# Patient Record
Sex: FEMALE | Race: WHITE | NOT HISPANIC OR LATINO | Employment: OTHER | URBAN - METROPOLITAN AREA
[De-identification: names, ages, dates, MRNs, and addresses within clinical notes are randomized per-mention and may not be internally consistent; named-entity substitution may affect disease eponyms.]

---

## 2017-01-03 ENCOUNTER — TELEPHONE (OUTPATIENT)
Dept: UROLOGY | Facility: CLINIC | Age: 77
End: 2017-01-03

## 2017-01-04 NOTE — TELEPHONE ENCOUNTER
----- Message from Gayle Schaefer LPN sent at 12/28/2016  1:28 PM CST -----  Contact: self  [phone]  Pt informed you were out this week, states she will wait for you to return to clinic next week. States she cannot afford Elmiron or Pyridium scripts that were written by PCP. Wants to know if there are any alternatives you can prescribe that are more affordable.  ----- Message -----     From: Charo Cabrera MA     Sent: 12/28/2016  12:21 PM       To: [facility] Staff    States she is calling regarding ic and the script Elmiron that is too expensive.  States she is in pain again and the antibiotic is not working and she needs something else.

## 2017-01-04 NOTE — TELEPHONE ENCOUNTER
Called and left a message on the home answering machine.  The patient can get AZO Standard over the counter which contains the same ingredient as the Pyridium.  I asked that she call the office if she has any other questions or if this does not work.

## 2017-03-08 ENCOUNTER — HOSPITAL ENCOUNTER (OUTPATIENT)
Dept: RADIOLOGY | Facility: CLINIC | Age: 77
Discharge: HOME | End: 2017-03-08
Attending: INTERNAL MEDICINE
Payer: MEDICARE

## 2017-03-08 ENCOUNTER — HOSPITAL ENCOUNTER (OUTPATIENT)
Dept: RADIOLOGY | Facility: HOSPITAL | Age: 77
Discharge: HOME | End: 2017-03-08
Attending: INTERNAL MEDICINE
Payer: MEDICARE

## 2017-03-08 DIAGNOSIS — Z12.31 VISIT FOR SCREENING MAMMOGRAM: ICD-10-CM

## 2017-03-08 DIAGNOSIS — M85.80 OSTEOPENIA: ICD-10-CM

## 2017-03-08 DIAGNOSIS — M94.9 DISORDER OF CARTILAGE: ICD-10-CM

## 2017-03-08 PROCEDURE — 77067 SCR MAMMO BI INCL CAD: CPT | Mod: TC

## 2017-03-08 PROCEDURE — 77067 SCR MAMMO BI INCL CAD: CPT | Mod: 26,,, | Performed by: RADIOLOGY

## 2017-03-08 PROCEDURE — 77080 DXA BONE DENSITY AXIAL: CPT | Mod: 26,,, | Performed by: INTERNAL MEDICINE

## 2017-03-08 PROCEDURE — 77080 DXA BONE DENSITY AXIAL: CPT | Mod: TC

## 2017-03-08 PROCEDURE — 77063 BREAST TOMOSYNTHESIS BI: CPT | Mod: 26,,, | Performed by: RADIOLOGY

## 2017-03-15 ENCOUNTER — OFFICE VISIT (OUTPATIENT)
Dept: INTERNAL MEDICINE | Facility: CLINIC | Age: 77
End: 2017-03-15
Payer: MEDICARE

## 2017-03-15 VITALS
SYSTOLIC BLOOD PRESSURE: 110 MMHG | HEIGHT: 64 IN | DIASTOLIC BLOOD PRESSURE: 60 MMHG | WEIGHT: 163.13 LBS | OXYGEN SATURATION: 95 % | HEART RATE: 75 BPM | BODY MASS INDEX: 27.85 KG/M2

## 2017-03-15 DIAGNOSIS — F32.0 MAJOR DEPRESSIVE DISORDER, SINGLE EPISODE, MILD: ICD-10-CM

## 2017-03-15 DIAGNOSIS — E78.5 HYPERLIPIDEMIA, UNSPECIFIED HYPERLIPIDEMIA TYPE: Primary | ICD-10-CM

## 2017-03-15 DIAGNOSIS — N30.10 INTERSTITIAL CYSTITIS: ICD-10-CM

## 2017-03-15 DIAGNOSIS — C91.10 CHRONIC LYMPHOCYTIC LEUKEMIA: ICD-10-CM

## 2017-03-15 PROCEDURE — 99214 OFFICE O/P EST MOD 30 MIN: CPT | Mod: S$GLB,,, | Performed by: INTERNAL MEDICINE

## 2017-03-15 PROCEDURE — 99999 PR PBB SHADOW E&M-EST. PATIENT-LVL III: CPT | Mod: PBBFAC,,, | Performed by: INTERNAL MEDICINE

## 2017-03-15 PROCEDURE — 99499 UNLISTED E&M SERVICE: CPT | Mod: S$GLB,,, | Performed by: INTERNAL MEDICINE

## 2017-03-15 PROCEDURE — 1160F RVW MEDS BY RX/DR IN RCRD: CPT | Mod: S$GLB,,, | Performed by: INTERNAL MEDICINE

## 2017-03-15 PROCEDURE — 1157F ADVNC CARE PLAN IN RCRD: CPT | Mod: S$GLB,,, | Performed by: INTERNAL MEDICINE

## 2017-03-15 PROCEDURE — 1159F MED LIST DOCD IN RCRD: CPT | Mod: S$GLB,,, | Performed by: INTERNAL MEDICINE

## 2017-03-15 PROCEDURE — 1126F AMNT PAIN NOTED NONE PRSNT: CPT | Mod: S$GLB,,, | Performed by: INTERNAL MEDICINE

## 2017-03-15 NOTE — PROGRESS NOTES
CHIEF COMPLAINT:Follow up of CLL, stress, reflux, allergies    HISTORY OF PRESENT ILLNESS: This is a 75-year-old woman who presents for follow up of above.     Her abdominal pain is better. Her interstitial cystitis is better. She has been watching her diet closely.  She is no longer drinking alcohol and soda beverages which she thinks triggered her flare last fall.  No dysuria or hematuria     Her WBC for her CLL has been stable. No fever, chills, night sweatts, weight loss. She saw Dr Merchant 16    Stress is well managed with Celexa 40 mg 1 tablet daliy. She denies any overwhelming insomnia, anxiety or depression. Reflux is controlled on omeprazole 20 mg 2 tablets daily.       Allergies are controlled with loratadine 10 mg daily. She takes meclizine 25 mg as needed for dizziness. NO dizziness      No fever, chills, visual issues, hearing issues, chest pain, shortness of breath, nausea, vomiting, constipation, diarrhea, dysuria, hematuria, joint pain, muscle pain, rashes, headaches, insomnia.        PAST MEDICAL HISTORY:   1. Reflux.   2. Hyperlipidemia.   3. Interstitial cystitis - controlled with diet.   4. Chronic lymphocytic leukemia diagnosed . No need for treatment at this point in time.   5. Situational stress with anxiety and depression.   6. History of back pain in  - resolved.   7. History of liver biopsy  which revealed a hemangioma.     PAST SURGICAL HISTORY:   1. .   2. Hysterectomy.   3. Cholecystectomy.   4. Ganglion cyst removed from the left foot.     SOCIAL HISTORY: She is a past smoker, quit smoking 20 years ago; she   smoked a pack per week on and off for 5-10 years. No alcohol.    with two children.     MEDICATIONS and ALLERGIES: Updated on EPIC     PHYSICAL EXAMINATION:       General: Alert, oriented. No apparent distress. Affect within normal limits.   Conjunctivae anicteric. Tympanic membranes clear. Oropharynx erythematus with exudate   Neck supple. No  cervical lymphadenopathy  Respiratory effort normal. Lungs clear to auscultation.   Heart: Regular rate and rhythm without murmurs, gallops or rubs.   No lower extremity edema.      Labs 3/8/2017  MMG 3/8/17 reviewed  BMD 3/8/17- normal.       ASSESSMENT AND PLAN:       1. Interstitial cystitis - stable  2. Reflux - controlled   3. Situational stress/major depression - on celexa to 40 mg 1 tablet daily  4. CLL -stable  6. History of thyroid nodules - thyroid ultrasound stable   7. Hyperlipidemia -continue to work on diet, exercise and weight loss and flax seed oil. Does not want lipid lowering medications    8. I'll see her back in 6 months, sooner if problems arise.       Influenza , Prevnar 10/15, Pneumvax 10/14  MMG   BMD 3/17 - normal and no change  Colonoscopy 2013 -diverticuli - otherwise normal due 10 years - Brother  of colon cancer at age 62, will do in 2018

## 2017-03-28 ENCOUNTER — LAB VISIT (OUTPATIENT)
Dept: LAB | Facility: HOSPITAL | Age: 77
End: 2017-03-28
Attending: INTERNAL MEDICINE
Payer: MEDICARE

## 2017-03-28 ENCOUNTER — OFFICE VISIT (OUTPATIENT)
Dept: HEMATOLOGY/ONCOLOGY | Facility: CLINIC | Age: 77
End: 2017-03-28
Payer: MEDICARE

## 2017-03-28 VITALS
RESPIRATION RATE: 20 BRPM | HEART RATE: 68 BPM | BODY MASS INDEX: 27.93 KG/M2 | DIASTOLIC BLOOD PRESSURE: 60 MMHG | SYSTOLIC BLOOD PRESSURE: 128 MMHG | TEMPERATURE: 99 F | OXYGEN SATURATION: 95 % | WEIGHT: 162.69 LBS

## 2017-03-28 DIAGNOSIS — C91.10 CHRONIC LYMPHOCYTIC LEUKEMIA: Primary | ICD-10-CM

## 2017-03-28 DIAGNOSIS — C91.10 CHRONIC LYMPHOCYTIC LEUKEMIA: ICD-10-CM

## 2017-03-28 LAB
ALBUMIN SERPL BCP-MCNC: 3.5 G/DL
ALP SERPL-CCNC: 68 U/L
ALT SERPL W/O P-5'-P-CCNC: 33 U/L
ANION GAP SERPL CALC-SCNC: 8 MMOL/L
AST SERPL-CCNC: 31 U/L
BILIRUB SERPL-MCNC: 0.5 MG/DL
BUN SERPL-MCNC: 17 MG/DL
CALCIUM SERPL-MCNC: 9.5 MG/DL
CHLORIDE SERPL-SCNC: 102 MMOL/L
CO2 SERPL-SCNC: 30 MMOL/L
CREAT SERPL-MCNC: 0.8 MG/DL
ERYTHROCYTE [DISTWIDTH] IN BLOOD BY AUTOMATED COUNT: 13.6 %
EST. GFR  (AFRICAN AMERICAN): >60 ML/MIN/1.73 M^2
EST. GFR  (NON AFRICAN AMERICAN): >60 ML/MIN/1.73 M^2
GLUCOSE SERPL-MCNC: 151 MG/DL
HCT VFR BLD AUTO: 41.2 %
HGB BLD-MCNC: 13.9 G/DL
LDH SERPL L TO P-CCNC: 179 U/L
MCH RBC QN AUTO: 30 PG
MCHC RBC AUTO-ENTMCNC: 33.7 %
MCV RBC AUTO: 89 FL
NEUTROPHILS # BLD AUTO: 4 K/UL
PLATELET # BLD AUTO: 225 K/UL
PMV BLD AUTO: 10.2 FL
POTASSIUM SERPL-SCNC: 4.5 MMOL/L
PROT SERPL-MCNC: 6.6 G/DL
RBC # BLD AUTO: 4.64 M/UL
SODIUM SERPL-SCNC: 140 MMOL/L
URATE SERPL-MCNC: 4.7 MG/DL
WBC # BLD AUTO: 22.83 K/UL

## 2017-03-28 PROCEDURE — 1160F RVW MEDS BY RX/DR IN RCRD: CPT | Mod: S$GLB,,, | Performed by: INTERNAL MEDICINE

## 2017-03-28 PROCEDURE — 36415 COLL VENOUS BLD VENIPUNCTURE: CPT

## 2017-03-28 PROCEDURE — 83615 LACTATE (LD) (LDH) ENZYME: CPT

## 2017-03-28 PROCEDURE — 99999 PR PBB SHADOW E&M-EST. PATIENT-LVL III: CPT | Mod: PBBFAC,,, | Performed by: INTERNAL MEDICINE

## 2017-03-28 PROCEDURE — 1157F ADVNC CARE PLAN IN RCRD: CPT | Mod: S$GLB,,, | Performed by: INTERNAL MEDICINE

## 2017-03-28 PROCEDURE — 99499 UNLISTED E&M SERVICE: CPT | Mod: S$GLB,,, | Performed by: INTERNAL MEDICINE

## 2017-03-28 PROCEDURE — 85027 COMPLETE CBC AUTOMATED: CPT

## 2017-03-28 PROCEDURE — 99214 OFFICE O/P EST MOD 30 MIN: CPT | Mod: S$GLB,,, | Performed by: INTERNAL MEDICINE

## 2017-03-28 PROCEDURE — 84550 ASSAY OF BLOOD/URIC ACID: CPT

## 2017-03-28 PROCEDURE — 1159F MED LIST DOCD IN RCRD: CPT | Mod: S$GLB,,, | Performed by: INTERNAL MEDICINE

## 2017-03-28 PROCEDURE — 1126F AMNT PAIN NOTED NONE PRSNT: CPT | Mod: S$GLB,,, | Performed by: INTERNAL MEDICINE

## 2017-03-28 PROCEDURE — 80053 COMPREHEN METABOLIC PANEL: CPT

## 2017-03-28 NOTE — PROGRESS NOTES
Subjective:       Patient ID: Jorge Montgomery is a 76 y.o. female.    Chief Complaint: Follow-up    HPI     Weight fluctuates but no significant weight loss.  Energy level ok- still reports fatigue associated with depression  Mom has dementia which is a big strain- age 92     Returns for routine follow-up.  No fevers, chills or recurrent infections. No night sweats.  No lymphadenopathy.  No bleeding or bruising.    Review of Systems   Constitutional: Negative for activity change, appetite change, chills, diaphoresis, fatigue, fever and unexpected weight change.   HENT: Negative for congestion, ear pain, mouth sores, nosebleeds, postnasal drip, rhinorrhea, sinus pressure, sneezing, sore throat, tinnitus and voice change.    Eyes: Negative for redness and visual disturbance.   Respiratory: Negative for cough, chest tightness, shortness of breath and wheezing.    Cardiovascular: Negative for chest pain, palpitations and leg swelling.   Gastrointestinal: Positive for constipation. Negative for abdominal distention, abdominal pain, blood in stool, diarrhea, nausea and vomiting.   Genitourinary: Negative for decreased urine volume, difficulty urinating, dysuria, frequency, hematuria, urgency and vaginal bleeding.   Musculoskeletal: Negative for arthralgias, back pain, joint swelling, myalgias, neck pain and neck stiffness.   Skin: Negative for color change, pallor, rash and wound.   Allergic/Immunologic: Negative for environmental allergies.   Neurological: Negative for dizziness, weakness, light-headedness, numbness and headaches.   Hematological: Negative for adenopathy. Does not bruise/bleed easily.   Psychiatric/Behavioral: Negative for confusion, decreased concentration and dysphoric mood.       Objective:      Physical Exam   Constitutional: She is oriented to person, place, and time. She appears well-developed and well-nourished. No distress.   Presents alone   HENT:   Head: Normocephalic and atraumatic.    Right Ear: External ear normal.   Left Ear: External ear normal.   Nose: Nose normal.   Mouth/Throat: Oropharynx is clear and moist. No oropharyngeal exudate.   Eyes: Conjunctivae and EOM are normal. Pupils are equal, round, and reactive to light. Right eye exhibits no discharge. Left eye exhibits no discharge. No scleral icterus.   Neck: Normal range of motion. Neck supple. No JVD present. No tracheal deviation present. No thyromegaly present.   Cardiovascular: Normal rate, regular rhythm, normal heart sounds and intact distal pulses.  Exam reveals no gallop and no friction rub.    No murmur heard.  Pulmonary/Chest: Effort normal and breath sounds normal. No stridor. No respiratory distress. She has no wheezes. She has no rales. She exhibits no tenderness.   Abdominal: Soft. Bowel sounds are normal. She exhibits no distension and no mass. There is no tenderness. There is no rebound and no guarding.   No hepatosplenomegaly   Musculoskeletal: Normal range of motion. She exhibits no edema or tenderness.   Lymphadenopathy:        Head (right side): No submandibular, no preauricular, no posterior auricular and no occipital adenopathy present.        Head (left side): No submandibular, no preauricular, no posterior auricular and no occipital adenopathy present.     She has no cervical adenopathy.        Right cervical: No superficial cervical, no deep cervical and no posterior cervical adenopathy present.       Left cervical: No superficial cervical, no deep cervical and no posterior cervical adenopathy present.     She has no axillary adenopathy.        Right: No inguinal and no supraclavicular adenopathy present.        Left: No inguinal and no supraclavicular adenopathy present.   Neurological: She is alert and oriented to person, place, and time. No cranial nerve deficit. She exhibits normal muscle tone. Coordination normal.   Skin: Skin is warm and dry. No rash noted. She is not diaphoretic. No erythema. No  pallor.   Psychiatric: She has a normal mood and affect. Her behavior is normal. Judgment and thought content normal.   Nursing note and vitals reviewed.    Labs- reviewed  Assessment:       1. Chronic lymphocytic leukemia        Plan:     1. Parameters stable  No indications to treat  Wbc not doubling, not anemic or thrombocytopenic  No weight loss, recurrent infections, lymphadenopathy or night sweats

## 2017-04-18 RX ORDER — OMEPRAZOLE 20 MG/1
CAPSULE, DELAYED RELEASE ORAL
Qty: 180 CAPSULE | Refills: 4 | Status: SHIPPED | OUTPATIENT
Start: 2017-04-18 | End: 2018-05-25 | Stop reason: SDUPTHER

## 2017-05-16 ENCOUNTER — NURSE TRIAGE (OUTPATIENT)
Dept: ADMINISTRATIVE | Facility: CLINIC | Age: 77
End: 2017-05-16

## 2017-05-17 ENCOUNTER — OFFICE VISIT (OUTPATIENT)
Dept: INTERNAL MEDICINE | Facility: CLINIC | Age: 77
End: 2017-05-17
Payer: MEDICARE

## 2017-05-17 ENCOUNTER — TELEPHONE (OUTPATIENT)
Dept: INTERNAL MEDICINE | Facility: CLINIC | Age: 77
End: 2017-05-17

## 2017-05-17 VITALS
BODY MASS INDEX: 27.52 KG/M2 | OXYGEN SATURATION: 98 % | WEIGHT: 161.19 LBS | RESPIRATION RATE: 16 BRPM | DIASTOLIC BLOOD PRESSURE: 74 MMHG | HEART RATE: 86 BPM | HEIGHT: 64 IN | SYSTOLIC BLOOD PRESSURE: 130 MMHG | TEMPERATURE: 99 F

## 2017-05-17 DIAGNOSIS — K57.30 DIVERTICULOSIS OF LARGE INTESTINE WITHOUT HEMORRHAGE: ICD-10-CM

## 2017-05-17 DIAGNOSIS — R10.2 SUPRAPUBIC PAIN: Primary | ICD-10-CM

## 2017-05-17 DIAGNOSIS — N30.10 INTERSTITIAL CYSTITIS: ICD-10-CM

## 2017-05-17 DIAGNOSIS — N39.0 URINARY TRACT INFECTION WITHOUT HEMATURIA, SITE UNSPECIFIED: ICD-10-CM

## 2017-05-17 LAB
BILIRUB SERPL-MCNC: NORMAL MG/DL
BLOOD URINE, POC: NORMAL
COLOR, POC UA: NORMAL
GLUCOSE UR QL STRIP: NORMAL
KETONES UR QL STRIP: NORMAL
LEUKOCYTE ESTERASE URINE, POC: NORMAL
NITRITE, POC UA: NORMAL
PH, POC UA: 5
PROTEIN, POC: NORMAL
SPECIFIC GRAVITY, POC UA: 1.02
UROBILINOGEN, POC UA: NORMAL

## 2017-05-17 PROCEDURE — 87086 URINE CULTURE/COLONY COUNT: CPT

## 2017-05-17 PROCEDURE — 81002 URINALYSIS NONAUTO W/O SCOPE: CPT | Mod: S$GLB,,, | Performed by: INTERNAL MEDICINE

## 2017-05-17 PROCEDURE — 99999 PR PBB SHADOW E&M-EST. PATIENT-LVL IV: CPT | Mod: PBBFAC,,, | Performed by: INTERNAL MEDICINE

## 2017-05-17 PROCEDURE — 99213 OFFICE O/P EST LOW 20 MIN: CPT | Mod: 25,S$GLB,, | Performed by: INTERNAL MEDICINE

## 2017-05-17 PROCEDURE — 1160F RVW MEDS BY RX/DR IN RCRD: CPT | Mod: S$GLB,,, | Performed by: INTERNAL MEDICINE

## 2017-05-17 PROCEDURE — 1159F MED LIST DOCD IN RCRD: CPT | Mod: S$GLB,,, | Performed by: INTERNAL MEDICINE

## 2017-05-17 PROCEDURE — 1125F AMNT PAIN NOTED PAIN PRSNT: CPT | Mod: S$GLB,,, | Performed by: INTERNAL MEDICINE

## 2017-05-17 RX ORDER — CIPROFLOXACIN 500 MG/1
500 TABLET ORAL 2 TIMES DAILY
Qty: 20 TABLET | Refills: 0 | Status: SHIPPED | OUTPATIENT
Start: 2017-05-17 | End: 2017-09-22

## 2017-05-17 NOTE — TELEPHONE ENCOUNTER
----- Message from Radha Mcintosh sent at 5/17/2017  3:10 PM CDT -----  Contact: Patient  Patient would like to get medical advice.  Symptoms (please be specific):  Pain in lower abdomen and foul smelling urine   How long has patient had these symptoms:  Since 5/15/17  Pharmacy name and phone #:  [id number] - [place] - [address] [facility] [phone] (Phone) [phone] (Fax)  Any drug allergies:  Please see chart.   Comments:  Please call the patient at [phone]    Thanks!

## 2017-05-17 NOTE — TELEPHONE ENCOUNTER
I called and spoke to the patient, she had chills and fever 99.9,  I was very cold, hurts when I sit, it hurts when i sit on a chair,3- 7/10, Pain since Monday, no burning like a fullness only, if i sit on a recliner I don't feel any pain, last bladder infection about 6 months, Booked  kady with Dr. Lozada

## 2017-05-17 NOTE — MR AVS SNAPSHOT
[facility]  [address]  [place] [address]  Phone: [phone]  Fax: [phone]                  Jorge Montgomery   2017 6:20 PM   Office Visit    Description:  Female : 1940   Provider:  Christianne Lozada MD   Department:  [facility]           Reason for Visit     Vaginal Pain     Cough     Generalized Body Aches           Diagnoses this Visit        Comments    Suprapubic pain    -  Primary     Urinary tract infection without hematuria, site unspecified         Interstitial cystitis         Diverticulosis of large intestine without hemorrhage                To Do List           Goals (5 Years of Data)     None       These Medications        Disp Refills Start End    ciprofloxacin HCl (CIPRO) 500 MG tablet 20 tablet 0 2017     Take 1 tablet (500 mg total) by mouth 2 (two) times daily. - Oral    Pharmacy: [facility] [address] [place], [place] [address], [place] - [address]  #: [phone]         [facility] On [facility]     [facility] On Call Nurse Care Line -  Assistance  Unless otherwise directed by your provider, please contact [facility], our nurse care line that is available for  assistance.     Registered nurses in the [facility] On Call Center provide: appointment scheduling, clinical advisement, health education, and other advisory services.  Call: [phone] (toll free)               Medications           Message regarding Medications     Verify the changes and/or additions to your medication regime listed below are the same as discussed with your clinician today.  If any of these changes or additions are incorrect, please notify your healthcare provider.        START taking these NEW medications        Refills    ciprofloxacin HCl (CIPRO) 500 MG tablet 0    Sig: Take 1 tablet (500 mg total) by mouth 2 (two) times daily.    Class: Normal    Route: Oral           Verify that the below list of medications is an accurate  representation of the medications you are currently taking.  If none reported, the list may be blank. If incorrect, please contact your healthcare provider. Carry this list with you in case of emergency.           Current Medications     artificial tear, hypromellose, (GENTEAL  MILD TO MODERATE) 0.3 % Drop Inject into the eye. 1 Gel Both eyes TID-QID    ASCORBATE CALCIUM (BAM-C ORAL) Take 1,000 mg by mouth once daily.    biotin 1 mg tablet Take 1,000 mcg by mouth once daily.    calcium carbonate (OS-JONELLE) 600 mg (1,500 mg) Tab Take 600 mg by mouth 2 (two) times daily with meals.    cetirizine (ZYRTEC) 10 MG tablet Take 1 tablet (10 mg total) by mouth once daily.    cinnamon bark 500 mg capsule Take 500 mg by mouth once daily.    ciprofloxacin HCl (CIPRO) 500 MG tablet Take 1 tablet (500 mg total) by mouth 2 (two) times daily.    citalopram (CELEXA) 40 MG tablet TAKE 1 TABLET ONE TIME DAILY    fluticasone (FLONASE) 50 mcg/actuation nasal spray 1 spray by Each Nare route daily as needed for Rhinitis.    GLUCOSAMINE/MSM/CHONDROITIN A (TRIPLEFLEX ORAL) Take 2 tablets by mouth once daily.    loratadine (CLARITIN) 10 mg tablet Take by mouth. 1 Tablet Oral Every day    meclizine (ANTIVERT) 25 mg tablet 1/2-1 tablet every 6 hours as needed for dizziness    MULTIVIT/IRON/FA/K/HERB NO.244 (ALIVE WOMEN'S ENERGY ORAL) Take 1 tablet by mouth once daily.    omeprazole (PRILOSEC) 20 MG capsule TAKE 1 CAPSULE TWICE DAILY    pentosan polysulfate (ELMIRON) 100 mg Cap Take 1 capsule (100 mg total) by mouth 3 (three) times daily.    phenazopyridine (PYRIDIUM) 200 MG tablet Take 1 tablet (200 mg total) by mouth 3 (three) times daily as needed for Pain.    SACCHAROMYCES BOULARDII (PROBIOTIC, S.BOULARDII, ORAL) Take 1 capsule by mouth once daily.    VIT C/VIT E AC/LUT/COPPER/ZINC (PRESERVISION LUTEIN ORAL) Take 1 tablet by mouth once daily.           Clinical Reference Information           Your Vitals Were     BP Pulse Temp Resp Height  "Weight    130/74 (BP Location: Left arm, Patient Position: Sitting, BP Method: Manual) 86 98.6 °F (37 °C) 16 5' 4" (1.626 m) 73.1 kg (161 lb 2.5 oz)    SpO2 BMI             98% 27.66 kg/m2         Blood Pressure          Most Recent Value    BP  130/74      Allergies as of 5/17/2017     Compazine [Prochlorperazine Edisylate]    Sulfa (Sulfonamide Antibiotics)      Immunizations Administered on Date of Encounter - 5/17/2017     None      Orders Placed During Today's Visit      Normal Orders This Visit    POCT URINE DIPSTICK WITHOUT MICROSCOPE     Urine culture       Language Assistance Services     ATTENTION: Language assistance services are available, free of charge. Please call [phone].      ATENCIÓN: Si esterla stacy, tiene a shepard disposición servicios gratuitos de asistencia lingüística. Llame al [phone].     CHÚ Ý: N?u b?n nói Ti?ng Vi?t, có các d?ch v? h? tr? ngôn ng? mi?n phí dành cho b?n. G?i s? [phone].         [facility] complies with applicable Federal civil rights laws and does not discriminate on the basis of race, color, national origin, age, disability, or sex.        "

## 2017-05-18 NOTE — PROGRESS NOTES
Subjective:       Patient ID: Jorge Montgomery is a 76 y.o. female.    Chief Complaint: Vaginal Pain; Cough; and Generalized Body Aches    Vaginal Pain   Primary symptoms comment: perineal pain and suprapubic pain. This is a new problem. The current episode started in the past 7 days. The problem occurs constantly. The problem has been unchanged. The pain is mild. She is not pregnant. Associated symptoms include abdominal pain, frequency and urgency. Pertinent negatives include no chills, constipation, diarrhea, discolored urine, dysuria, fever, headaches, nausea, rash, sore throat or vomiting. Nothing aggravates the symptoms. She has tried nothing for the symptoms. The treatment provided no relief.   Cough   Pertinent negatives include no chest pain, chills, ear pain, fever, headaches, postnasal drip, rash, sore throat, shortness of breath or wheezing.     Review of Systems   Constitutional: Negative for activity change, chills, fatigue and fever.   HENT: Negative for congestion, ear pain, nosebleeds, postnasal drip, sinus pressure and sore throat.    Eyes: Negative.  Negative for visual disturbance.   Respiratory: Positive for cough. Negative for chest tightness, shortness of breath and wheezing.    Cardiovascular: Negative for chest pain.   Gastrointestinal: Positive for abdominal pain. Negative for constipation, diarrhea, nausea and vomiting.        History of diverticulosis   Genitourinary: Positive for frequency, urgency and vaginal pain. Negative for difficulty urinating and dysuria.        History of interstitial cystitis   Musculoskeletal: Negative for arthralgias and neck stiffness.   Skin: Negative for rash.   Neurological: Negative for dizziness, weakness and headaches.   Psychiatric/Behavioral: Negative for sleep disturbance. The patient is not nervous/anxious.        Objective:      Physical Exam   Constitutional: She is oriented to person, place, and time. She appears well-developed and  well-nourished.  Non-toxic appearance. No distress.   HENT:   Head: Normocephalic and atraumatic.   Right Ear: Tympanic membrane, external ear and ear canal normal.   Left Ear: Tympanic membrane, external ear and ear canal normal.   Eyes: EOM are normal. Pupils are equal, round, and reactive to light. No scleral icterus.   Neck: Normal range of motion. Neck supple. No thyromegaly present.   Cardiovascular: Normal rate, regular rhythm and normal heart sounds.    Pulmonary/Chest: Effort normal and breath sounds normal.   Abdominal: Soft. Bowel sounds are normal. She exhibits no mass. There is tenderness in the right lower quadrant and left lower quadrant. There is no rigidity, no rebound and no guarding.   Musculoskeletal: Normal range of motion.   Lymphadenopathy:     She has no cervical adenopathy.   Neurological: She is alert and oriented to person, place, and time. She has normal reflexes. She displays normal reflexes. No cranial nerve deficit. She exhibits normal muscle tone. Coordination normal.   Skin: Skin is warm and dry.   Psychiatric: She has a normal mood and affect. Her behavior is normal.       Assessment:       1. Suprapubic pain    2. Urinary tract infection without hematuria, site unspecified    3. Interstitial cystitis    4. Diverticulosis of large intestine without hemorrhage        Plan:   Jorge was seen today for vaginal pain, cough and generalized body aches.    Diagnoses and all orders for this visit:    Suprapubic pain  -     POCT URINE DIPSTICK WITHOUT MICROSCOPE  -     Urine culture    Urinary tract infection without hematuria, site unspecified  -     Urine culture    Interstitial cystitis  -     Urine culture    Diverticulosis of large intestine without hemorrhage    Other orders  -     ciprofloxacin HCl (CIPRO) 500 MG tablet; Take 1 tablet (500 mg total) by mouth 2 (two) times daily.

## 2017-05-19 ENCOUNTER — TELEPHONE (OUTPATIENT)
Dept: INTERNAL MEDICINE | Facility: CLINIC | Age: 77
End: 2017-05-19

## 2017-05-19 LAB — BACTERIA UR CULT: NO GROWTH

## 2017-09-13 ENCOUNTER — LAB VISIT (OUTPATIENT)
Dept: LAB | Facility: HOSPITAL | Age: 77
End: 2017-09-13
Attending: INTERNAL MEDICINE
Payer: MEDICARE

## 2017-09-13 DIAGNOSIS — E78.5 HYPERLIPIDEMIA, UNSPECIFIED HYPERLIPIDEMIA TYPE: ICD-10-CM

## 2017-09-13 LAB
ALBUMIN SERPL BCP-MCNC: 3.9 G/DL
ALP SERPL-CCNC: 67 U/L
ALT SERPL W/O P-5'-P-CCNC: 34 U/L
ANION GAP SERPL CALC-SCNC: 9 MMOL/L
AST SERPL-CCNC: 37 U/L
BASOPHILS # BLD AUTO: ABNORMAL K/UL
BASOPHILS NFR BLD: 0 %
BILIRUB SERPL-MCNC: 0.7 MG/DL
BUN SERPL-MCNC: 13 MG/DL
CALCIUM SERPL-MCNC: 9.6 MG/DL
CHLORIDE SERPL-SCNC: 103 MMOL/L
CHOLEST SERPL-MCNC: 222 MG/DL
CHOLEST/HDLC SERPL: 4 {RATIO}
CO2 SERPL-SCNC: 30 MMOL/L
CREAT SERPL-MCNC: 0.8 MG/DL
DIFFERENTIAL METHOD: ABNORMAL
EOSINOPHIL # BLD AUTO: ABNORMAL K/UL
EOSINOPHIL NFR BLD: 0 %
ERYTHROCYTE [DISTWIDTH] IN BLOOD BY AUTOMATED COUNT: 14.2 %
EST. GFR  (AFRICAN AMERICAN): >60 ML/MIN/1.73 M^2
EST. GFR  (NON AFRICAN AMERICAN): >60 ML/MIN/1.73 M^2
GLUCOSE SERPL-MCNC: 102 MG/DL
HCT VFR BLD AUTO: 42 %
HDLC SERPL-MCNC: 55 MG/DL
HDLC SERPL: 24.8 %
HGB BLD-MCNC: 14.2 G/DL
LDLC SERPL CALC-MCNC: 142.4 MG/DL
LYMPHOCYTES # BLD AUTO: ABNORMAL K/UL
LYMPHOCYTES NFR BLD: 85 %
MCH RBC QN AUTO: 29.7 PG
MCHC RBC AUTO-ENTMCNC: 33.8 G/DL
MCV RBC AUTO: 88 FL
MONOCYTES # BLD AUTO: ABNORMAL K/UL
MONOCYTES NFR BLD: 0 %
NEUTROPHILS NFR BLD: 15 %
NONHDLC SERPL-MCNC: 167 MG/DL
PLATELET # BLD AUTO: 248 K/UL
PMV BLD AUTO: 10.2 FL
POTASSIUM SERPL-SCNC: 4.2 MMOL/L
PROT SERPL-MCNC: 7 G/DL
RBC # BLD AUTO: 4.78 M/UL
SODIUM SERPL-SCNC: 142 MMOL/L
TRIGL SERPL-MCNC: 123 MG/DL
TSH SERPL DL<=0.005 MIU/L-ACNC: 1.26 UIU/ML
WBC # BLD AUTO: 23.37 K/UL

## 2017-09-13 PROCEDURE — 85060 BLOOD SMEAR INTERPRETATION: CPT | Mod: ,,, | Performed by: PATHOLOGY

## 2017-09-13 PROCEDURE — 84443 ASSAY THYROID STIM HORMONE: CPT

## 2017-09-13 PROCEDURE — 85027 COMPLETE CBC AUTOMATED: CPT

## 2017-09-13 PROCEDURE — 80053 COMPREHEN METABOLIC PANEL: CPT

## 2017-09-13 PROCEDURE — 80061 LIPID PANEL: CPT

## 2017-09-13 PROCEDURE — 85007 BL SMEAR W/DIFF WBC COUNT: CPT

## 2017-09-13 PROCEDURE — 36415 COLL VENOUS BLD VENIPUNCTURE: CPT

## 2017-09-14 LAB — PATH REV BLD -IMP: NORMAL

## 2017-09-20 ENCOUNTER — TELEPHONE (OUTPATIENT)
Dept: ENDOSCOPY | Facility: HOSPITAL | Age: 77
End: 2017-09-20

## 2017-09-20 ENCOUNTER — OFFICE VISIT (OUTPATIENT)
Dept: INTERNAL MEDICINE | Facility: CLINIC | Age: 77
End: 2017-09-20
Payer: MEDICARE

## 2017-09-20 ENCOUNTER — IMMUNIZATION (OUTPATIENT)
Dept: INTERNAL MEDICINE | Facility: CLINIC | Age: 77
End: 2017-09-20
Payer: MEDICARE

## 2017-09-20 VITALS
DIASTOLIC BLOOD PRESSURE: 71 MMHG | SYSTOLIC BLOOD PRESSURE: 115 MMHG | HEART RATE: 73 BPM | HEIGHT: 64 IN | WEIGHT: 161.63 LBS | BODY MASS INDEX: 27.59 KG/M2

## 2017-09-20 DIAGNOSIS — Z00.00 ROUTINE GENERAL MEDICAL EXAMINATION AT A HEALTH CARE FACILITY: Primary | ICD-10-CM

## 2017-09-20 DIAGNOSIS — Z80.0 FAMILY HISTORY OF COLON CANCER: ICD-10-CM

## 2017-09-20 DIAGNOSIS — N30.10 INTERSTITIAL CYSTITIS: ICD-10-CM

## 2017-09-20 DIAGNOSIS — K21.9 GASTROESOPHAGEAL REFLUX DISEASE WITHOUT ESOPHAGITIS: ICD-10-CM

## 2017-09-20 DIAGNOSIS — J30.1 NON-SEASONAL ALLERGIC RHINITIS DUE TO POLLEN, UNSPECIFIED CHRONICITY: ICD-10-CM

## 2017-09-20 DIAGNOSIS — C91.10 CHRONIC LYMPHOCYTIC LEUKEMIA: ICD-10-CM

## 2017-09-20 DIAGNOSIS — R19.4 CHANGE IN BOWEL HABIT: ICD-10-CM

## 2017-09-20 DIAGNOSIS — E78.5 HYPERLIPIDEMIA, UNSPECIFIED HYPERLIPIDEMIA TYPE: ICD-10-CM

## 2017-09-20 PROCEDURE — 90662 IIV NO PRSV INCREASED AG IM: CPT | Mod: S$GLB,,, | Performed by: INTERNAL MEDICINE

## 2017-09-20 PROCEDURE — 99999 PR PBB SHADOW E&M-EST. PATIENT-LVL IV: CPT | Mod: PBBFAC,,, | Performed by: INTERNAL MEDICINE

## 2017-09-20 PROCEDURE — 99397 PER PM REEVAL EST PAT 65+ YR: CPT | Mod: 25,S$GLB,, | Performed by: INTERNAL MEDICINE

## 2017-09-20 PROCEDURE — G0008 ADMIN INFLUENZA VIRUS VAC: HCPCS | Mod: S$GLB,,, | Performed by: INTERNAL MEDICINE

## 2017-09-20 PROCEDURE — 99499 UNLISTED E&M SERVICE: CPT | Mod: S$GLB,,, | Performed by: INTERNAL MEDICINE

## 2017-09-20 PROCEDURE — 99213 OFFICE O/P EST LOW 20 MIN: CPT | Mod: 25,S$GLB,, | Performed by: INTERNAL MEDICINE

## 2017-09-20 RX ORDER — CITALOPRAM 40 MG/1
TABLET, FILM COATED ORAL
Qty: 90 TABLET | Refills: 3 | Status: SHIPPED | OUTPATIENT
Start: 2017-09-20 | End: 2018-04-03 | Stop reason: SDUPTHER

## 2017-09-20 NOTE — PROGRESS NOTES
"CHIEF COMPLAINT:Annual exam and Follow up of CLL, stress, reflux, allergies    HISTORY OF PRESENT ILLNESS: This is a 76-year-old woman who presents for follow up of above.      Her abdominal pain is better. Her interstitial cystitis is better. She has been watching her diet closely.  She is no longer drinking alcohol and soda beverages which she thinks triggered her flare last fall.  No dysuria or hematuria     Her WBC for her CLL has been stable. No fever, chills, night sweatts, weight loss. She saw Dr Merchant 16    Stress is well managed with Celexa 40 mg 1 tablet daliy. She denies any overwhelming insomnia, anxiety or depression. Reflux is controlled on omeprazole 20 mg 2 tablets daily.       Allergies are controlled with loratadine 10 mg daily. She takes meclizine 25 mg as needed for dizziness. NO dizziness      No fever, chills, visual issues, hearing issues, chest pain, shortness of breath, nausea, vomiting, constipation, diarrhea, dysuria, hematuria, joint pain, muscle pain, rashes, headaches, insomnia.        PAST MEDICAL HISTORY:   1. Reflux.   2. Hyperlipidemia.   3. Interstitial cystitis - controlled with diet.   4. Chronic lymphocytic leukemia diagnosed . No need for treatment at this point in time.   5. Situational stress with anxiety and depression.   6. History of back pain in  - resolved.   7. History of liver biopsy  which revealed a hemangioma.     PAST SURGICAL HISTORY:   1. .   2. Hysterectomy.   3. Cholecystectomy.   4. Ganglion cyst removed from the left foot.     SOCIAL HISTORY: She is a past smoker, quit smoking 20 years ago; she   smoked a pack per week on and off for 5-10 years. No alcohol.    with two children.     MEDICATIONS and ALLERGIES: Updated on EPIC     PHYSICAL EXAMINATION:      /71 (BP Location: Left arm, Patient Position: Sitting, BP Method: Medium (Manual))   Pulse 73   Ht 5' 4" (1.626 m)   Wt 73.3 kg (161 lb 9.6 oz)   BMI 27.74 kg/m² "     General: Alert, oriented. No apparent distress. Affect within normal limits.   Conjunctivae anicteric. Tympanic membranes clear. Oropharynx erythematus with exudate   Neck supple. No cervical lymphadenopathy  Respiratory effort normal. Lungs clear to auscultation.   Heart: Regular rate and rhythm without murmurs, gallops or rubs.   No lower extremity edema.       Labs 17 reviewed  MMG 3/8/17 reviewed  BMD 3/8/17- normal.       ASSESSMENT AND PLAN:     Annual exam - discussed diet, exercise and safety issues.    Influenza , Prevnar 10/15, Pneumvax 10/14  MMG   BMD 3/17 - normal and no change  Colonoscopy 2013 -diverticuli - otherwise normal due 10 years - Brother  of colon cancer at age 62, will do in 2018      Other medical problems discussed at this visit. Billing will be separate and based on time. Spent 15 minutes in counseling regarding these medical problems.      1. Interstitial cystitis - stable  2. Reflux - controlled   3. Situational stress/major depression - on celexa to 40 mg 1 tablet daily  4. CLL -stable  6. History of thyroid nodules - thyroid ultrasound stable   7. Hyperlipidemia -continue to work on diet, exercise and weight loss and flax seed oil. Does not want lipid lowering medications    8. I'll see her back in 6 months, sooner if problems arise.        Influenza , Prevnar 10/15, Pneumvax 10/14  MMG   BMD 3/17 - normal and no change  Colonoscopy 2013 -diverticuli - otherwise normal due 10 years - Brother  of colon cancer at age 62, will do in 2018

## 2017-09-21 ENCOUNTER — TELEPHONE (OUTPATIENT)
Dept: ENDOSCOPY | Facility: HOSPITAL | Age: 77
End: 2017-09-21

## 2017-09-22 ENCOUNTER — TELEPHONE (OUTPATIENT)
Dept: ENDOSCOPY | Facility: HOSPITAL | Age: 77
End: 2017-09-22

## 2017-09-22 DIAGNOSIS — Z12.11 SPECIAL SCREENING FOR MALIGNANT NEOPLASMS, COLON: Primary | ICD-10-CM

## 2017-09-22 RX ORDER — SODIUM, POTASSIUM,MAG SULFATES 17.5-3.13G
1 SOLUTION, RECONSTITUTED, ORAL ORAL ONCE
Qty: 1 BOTTLE | Refills: 0 | Status: SHIPPED | OUTPATIENT
Start: 2017-09-22 | End: 2017-09-22

## 2017-09-26 ENCOUNTER — TELEPHONE (OUTPATIENT)
Dept: ENDOSCOPY | Facility: HOSPITAL | Age: 77
End: 2017-09-26

## 2017-09-26 DIAGNOSIS — Z12.11 SPECIAL SCREENING FOR MALIGNANT NEOPLASMS, COLON: Primary | ICD-10-CM

## 2017-09-26 RX ORDER — POLYETHYLENE GLYCOL 3350, SODIUM SULFATE ANHYDROUS, SODIUM BICARBONATE, SODIUM CHLORIDE, POTASSIUM CHLORIDE 236; 22.74; 6.74; 5.86; 2.97 G/4L; G/4L; G/4L; G/4L; G/4L
4 POWDER, FOR SOLUTION ORAL ONCE
Qty: 4000 ML | Refills: 0 | Status: SHIPPED | OUTPATIENT
Start: 2017-09-26 | End: 2017-09-26

## 2017-09-26 NOTE — TELEPHONE ENCOUNTER
Per Pt request, colonoscopy prep changed to peg 3350, Pt notified, split peg 3350 prep instructions mailed to Pt, Pt notified and verbalized understanding.

## 2017-10-03 ENCOUNTER — SURGERY (OUTPATIENT)
Age: 77
End: 2017-10-03

## 2017-10-03 ENCOUNTER — ANESTHESIA (OUTPATIENT)
Dept: ENDOSCOPY | Facility: HOSPITAL | Age: 77
End: 2017-10-03
Payer: MEDICARE

## 2017-10-03 ENCOUNTER — ANESTHESIA EVENT (OUTPATIENT)
Dept: ENDOSCOPY | Facility: HOSPITAL | Age: 77
End: 2017-10-03
Payer: MEDICARE

## 2017-10-03 ENCOUNTER — HOSPITAL ENCOUNTER (OUTPATIENT)
Facility: HOSPITAL | Age: 77
Discharge: HOME | End: 2017-10-03
Attending: INTERNAL MEDICINE | Admitting: INTERNAL MEDICINE
Payer: MEDICARE

## 2017-10-03 VITALS — RESPIRATION RATE: 23 BRPM

## 2017-10-03 VITALS
TEMPERATURE: 99 F | SYSTOLIC BLOOD PRESSURE: 134 MMHG | WEIGHT: 158 LBS | HEART RATE: 56 BPM | BODY MASS INDEX: 26.98 KG/M2 | OXYGEN SATURATION: 97 % | RESPIRATION RATE: 16 BRPM | HEIGHT: 64 IN | DIASTOLIC BLOOD PRESSURE: 61 MMHG

## 2017-10-03 DIAGNOSIS — Z80.0 FAMILY HISTORY OF COLON CANCER: ICD-10-CM

## 2017-10-03 DIAGNOSIS — R19.4 BOWEL HABIT CHANGES: ICD-10-CM

## 2017-10-03 DIAGNOSIS — Z12.11 SCREENING FOR COLON CANCER: Primary | ICD-10-CM

## 2017-10-03 PROCEDURE — 25000003 PHARM REV CODE 250: Performed by: INTERNAL MEDICINE

## 2017-10-03 PROCEDURE — 37000008 HC ANESTHESIA 1ST 15 MINUTES: Performed by: INTERNAL MEDICINE

## 2017-10-03 PROCEDURE — D9220A PRA ANESTHESIA: Mod: ANES,,, | Performed by: ANESTHESIOLOGY

## 2017-10-03 PROCEDURE — D9220A PRA ANESTHESIA: Mod: CRNA,,, | Performed by: NURSE ANESTHETIST, CERTIFIED REGISTERED

## 2017-10-03 PROCEDURE — 37000009 HC ANESTHESIA EA ADD 15 MINS: Performed by: INTERNAL MEDICINE

## 2017-10-03 PROCEDURE — 45378 DIAGNOSTIC COLONOSCOPY: CPT | Performed by: INTERNAL MEDICINE

## 2017-10-03 PROCEDURE — 63600175 PHARM REV CODE 636 W HCPCS: Performed by: NURSE ANESTHETIST, CERTIFIED REGISTERED

## 2017-10-03 PROCEDURE — 45378 DIAGNOSTIC COLONOSCOPY: CPT | Mod: ,,, | Performed by: INTERNAL MEDICINE

## 2017-10-03 RX ORDER — SODIUM CHLORIDE 9 MG/ML
INJECTION, SOLUTION INTRAVENOUS CONTINUOUS
Status: DISCONTINUED | OUTPATIENT
Start: 2017-10-03 | End: 2017-10-03 | Stop reason: HOSPADM

## 2017-10-03 RX ORDER — PROPOFOL 10 MG/ML
VIAL (ML) INTRAVENOUS CONTINUOUS PRN
Status: DISCONTINUED | OUTPATIENT
Start: 2017-10-03 | End: 2017-10-03

## 2017-10-03 RX ORDER — PROPOFOL 10 MG/ML
VIAL (ML) INTRAVENOUS
Status: DISCONTINUED | OUTPATIENT
Start: 2017-10-03 | End: 2017-10-03

## 2017-10-03 RX ORDER — LIDOCAINE HCL/PF 100 MG/5ML
SYRINGE (ML) INTRAVENOUS
Status: DISCONTINUED | OUTPATIENT
Start: 2017-10-03 | End: 2017-10-03

## 2017-10-03 RX ADMIN — PROPOFOL 70 MG: 10 INJECTION, EMULSION INTRAVENOUS at 09:10

## 2017-10-03 RX ADMIN — LIDOCAINE HYDROCHLORIDE 100 MG: 20 INJECTION, SOLUTION INTRAVENOUS at 09:10

## 2017-10-03 RX ADMIN — SODIUM CHLORIDE: 0.9 INJECTION, SOLUTION INTRAVENOUS at 09:10

## 2017-10-03 RX ADMIN — PROPOFOL 150 MCG/KG/MIN: 10 INJECTION, EMULSION INTRAVENOUS at 09:10

## 2017-10-03 NOTE — PATIENT INSTRUCTIONS
Discharge Summary/Instructions after an Endoscopic Procedure  Patient Name: Jorge Montgomery  Patient MRN: [id number]  Patient YOB: 1940 Tuesday, October 03, 2017  Kush Ramesh MD  RESTRICTIONS:  During your procedure today, you received medications for sedation.  These   medications may affect your judgment, balance and coordination.  Therefore,   for 24 hours, you have the following restrictions:   - DO NOT drive a car, operate machinery, make legal/financial decisions,   sign important papers or drink alcohol.    ACTIVITY:  The following day: return to full activity including work, except no heavy   lifting, straining or running for 3 days if polyps were removed.  DIET:  Eat and drink normally unless instructed otherwise.  TREATMENT FOR COMMON SIDE EFFECTS:  - Mild abdominal pain, belching, bloating or excessive gas: rest, eat   lightly and use a heating pad.  - Sore Throat: treat with throat lozenges and/or gargle with warm salt   water.  SYMPTOMS TO WATCH FOR AND REPORT TO YOUR PHYSICIAN:  1. Abdominal pain or bloating, other than gas cramps.  2. Chest pain.  3. Back pain.  4. Chills or fever occurring within 24 hours after the procedure.  5. Rectal bleeding, which would show as bright red, maroon, or black stools.   (A tablespoon of blood from the rectum is not serious, especially if   hemorrhoids are present.)  6. Vomiting.  7. Weakness or dizziness.  8. Because air was used during the procedure, expelling large amounts of air   from your rectum or belching is normal.  9. If a bowel prep was taken, you may not have a bowel movement for 1-3   days.  This is normal.  GO DIRECTLY TO THE EMERGENCY ROOM IF YOU HAVE ANY OF THE FOLLOWING:   Difficulty breathing   Chills and/or fever over 101 F   Persistent vomiting and/or vomiting blood   Severe abdominal pain   Severe chest pain   Black, tarry stools   Bleeding- more than one tablespoon  Your doctor recommends these additional instructions:  If any  biopsies were taken, your doctors clinic will call you in 1 to 2   weeks with any results.  You are being discharged to home.   You have a contact number available for emergencies.  The signs and symptoms   of potential delayed complications were discussed with you.  You may return   to normal activities tomorrow.  Written discharge instructions were   provided to you.   Resume your previous diet.   Continue your present medications.   Your physician has recommended a repeat colonoscopy in five years for   surveillance.   Return to your referring physician.  For questions, problems or results please call your physician - Kush Ramesh MD at Work:  [phone].  [facility], EMERGENCY ROOM PHONE NUMBER: [phone]  IF A COMPLICATION OR EMERGENCY SITUATION ARISES AND YOU ARE UNABLE TO REACH   YOUR PHYSICIAN - GO DIRECTLY TO THE EMERGENCY ROOM.  Kush Ramesh MD  10/3/2017 9:56:44 AM  This report has been verified and signed electronically.

## 2017-10-03 NOTE — PLAN OF CARE
Pt aaox3, vss, no distress or pain, iv d/c'd no bleeding or hematoma noted at iv site pt  discharged with family with no distress

## 2017-10-03 NOTE — ANESTHESIA POSTPROCEDURE EVALUATION
"Anesthesia Post Evaluation    Patient: Jorge Montgomery    Procedure(s) Performed: Procedure(s) (LRB):  COLONOSCOPY (N/A)    Final Anesthesia Type: general  Patient location during evaluation: GI PACU  Patient participation: Yes- Able to Participate  Level of consciousness: awake and alert and oriented  Post-procedure vital signs: reviewed and stable  Pain management: adequate  Airway patency: patent  PONV status at discharge: No PONV  Anesthetic complications: no      Cardiovascular status: blood pressure returned to baseline and hemodynamically stable  Respiratory status: unassisted, spontaneous ventilation and room air  Hydration status: euvolemic  Follow-up not needed.        Visit Vitals  BP (!) 97/55   Pulse 74   Temp 36.9 °C (98.5 °F)   Resp 16   Ht 5' 4" (1.626 m)   Wt 71.7 kg (158 lb)   SpO2 98%   Breastfeeding? No   BMI 27.12 kg/m²       Pain/Yuli Score: Pain Assessment Performed: Yes (10/3/2017  9:05 AM)  Presence of Pain: denies (10/3/2017 10:00 AM)  Yuli Score: 9 (10/3/2017  9:59 AM)      "

## 2017-10-03 NOTE — TRANSFER OF CARE
"Anesthesia Transfer of Care Note    Patient: Jorge Montgomery    Procedure(s) Performed: Procedure(s) (LRB):  COLONOSCOPY (N/A)    Patient location: GI    Anesthesia Type: general    Transport from OR: Transported from OR on room air with adequate spontaneous ventilation    Post pain: adequate analgesia    Post assessment: tolerated procedure well and no apparent anesthetic complications    Post vital signs: stable    Level of consciousness: awake    Nausea/Vomiting: no nausea/vomiting    Complications: none    Transfer of care protocol was followed      Last vitals:   Visit Vitals  BP (!) 97/55   Pulse 74   Temp 36.9 °C (98.5 °F)   Resp 16   Ht 5' 4" (1.626 m)   Wt 71.7 kg (158 lb)   SpO2 98%   Breastfeeding? No   BMI 27.12 kg/m²     "

## 2017-10-03 NOTE — ANESTHESIA PREPROCEDURE EVALUATION
10/03/2017  Jorge Montgomery is a 76 y.o., female.    Pre-op Assessment    I have reviewed the Patient Summary Reports.      I have reviewed the Medications.     Review of Systems  Anesthesia Hx:  No problems with previous Anesthesia  History of prior surgery of interest to airway management or planning:  Denies Personal Hx of Anesthesia complications.   Social:  Former Smoker    Hematology/Oncology:  Hematology Normal       -- Cancer in past history:    EENT/Dental:EENT/Dental Normal   Cardiovascular:    Denies Angina.  Functional Capacity 3.5 METS    Pulmonary:   Denies Shortness of breath.    Hepatic/GI:   GERD    Musculoskeletal:   Arthritis     Neurological:  Neurology Normal    Endocrine:  Endocrine Normal    Psych:   Psychiatric History          Physical Exam  General:  Well nourished    Airway/Jaw/Neck:  Airway Findings: Mouth Opening: Normal General Airway Assessment: Adult  Mallampati: II  Jaw/Neck Findings:  Neck ROM: Normal ROM      Dental:  Dental Findings: In tact   Chest/Lungs:  Chest/Lungs Findings: Clear to auscultation, Normal Respiratory Rate     Heart/Vascular:  Heart Findings: Rate: Normal  Rhythm: Regular Rhythm  Sounds: Normal        Mental Status:  Mental Status Findings:  Cooperative, Alert and Oriented         Anesthesia Plan  Type of Anesthesia, risks & benefits discussed:  Anesthesia Type:  MAC, general  Patient's Preference:   Intra-op Monitoring Plan: standard ASA monitors  Intra-op Monitoring Plan Comments:   Post Op Pain Control Plan:   Post Op Pain Control Plan Comments:   Induction:   IV  Beta Blocker:  Patient is not currently on a Beta-Blocker (No further documentation required).       Informed Consent: Patient understands risks and agrees with Anesthesia plan.  Questions answered. Anesthesia consent signed with patient.  ASA Score: 2     Day of Surgery Review of History  & Physical:    H&P update referred to the surgeon.         Ready For Surgery From Anesthesia Perspective.

## 2017-10-10 ENCOUNTER — TELEPHONE (OUTPATIENT)
Dept: ENDOSCOPY | Facility: HOSPITAL | Age: 77
End: 2017-10-10

## 2017-10-13 ENCOUNTER — TELEPHONE (OUTPATIENT)
Dept: HEMATOLOGY/ONCOLOGY | Facility: CLINIC | Age: 77
End: 2017-10-13

## 2017-10-13 NOTE — TELEPHONE ENCOUNTER
----- Message from Rm Fernando sent at 10/11/2017  4:02 PM CDT -----  Contact: Pt   Pt will like to r/s 10/17 appt to 10/18 @ 8:00A    Contact::[phone] or [phone]

## 2017-11-02 ENCOUNTER — LAB VISIT (OUTPATIENT)
Dept: LAB | Facility: HOSPITAL | Age: 77
End: 2017-11-02
Attending: INTERNAL MEDICINE
Payer: MEDICARE

## 2017-11-02 ENCOUNTER — TELEPHONE (OUTPATIENT)
Dept: HEMATOLOGY/ONCOLOGY | Facility: CLINIC | Age: 77
End: 2017-11-02

## 2017-11-02 ENCOUNTER — OFFICE VISIT (OUTPATIENT)
Dept: HEMATOLOGY/ONCOLOGY | Facility: CLINIC | Age: 77
End: 2017-11-02
Payer: MEDICARE

## 2017-11-02 VITALS
HEART RATE: 69 BPM | SYSTOLIC BLOOD PRESSURE: 116 MMHG | BODY MASS INDEX: 27.02 KG/M2 | DIASTOLIC BLOOD PRESSURE: 64 MMHG | OXYGEN SATURATION: 97 % | TEMPERATURE: 98 F | RESPIRATION RATE: 20 BRPM | WEIGHT: 157.44 LBS

## 2017-11-02 DIAGNOSIS — C91.10 CHRONIC LYMPHOCYTIC LEUKEMIA: Primary | ICD-10-CM

## 2017-11-02 DIAGNOSIS — C91.10 CHRONIC LYMPHOCYTIC LEUKEMIA: ICD-10-CM

## 2017-11-02 LAB
ALBUMIN SERPL BCP-MCNC: 3.8 G/DL
ALP SERPL-CCNC: 70 U/L
ALT SERPL W/O P-5'-P-CCNC: 20 U/L
ANION GAP SERPL CALC-SCNC: 9 MMOL/L
AST SERPL-CCNC: 24 U/L
BILIRUB SERPL-MCNC: 0.5 MG/DL
BUN SERPL-MCNC: 16 MG/DL
CALCIUM SERPL-MCNC: 9.4 MG/DL
CHLORIDE SERPL-SCNC: 100 MMOL/L
CO2 SERPL-SCNC: 29 MMOL/L
CREAT SERPL-MCNC: 0.9 MG/DL
ERYTHROCYTE [DISTWIDTH] IN BLOOD BY AUTOMATED COUNT: 13.7 %
EST. GFR  (AFRICAN AMERICAN): >60 ML/MIN/1.73 M^2
EST. GFR  (NON AFRICAN AMERICAN): >60 ML/MIN/1.73 M^2
GLUCOSE SERPL-MCNC: 137 MG/DL
HCT VFR BLD AUTO: 42.1 %
HGB BLD-MCNC: 14 G/DL
IMM GRANULOCYTES # BLD AUTO: 0.05 K/UL
LDH SERPL L TO P-CCNC: 180 U/L
MCH RBC QN AUTO: 29 PG
MCHC RBC AUTO-ENTMCNC: 33.3 G/DL
MCV RBC AUTO: 87 FL
NEUTROPHILS # BLD AUTO: 4.6 K/UL
PLATELET # BLD AUTO: 242 K/UL
PMV BLD AUTO: 10.1 FL
POTASSIUM SERPL-SCNC: 4 MMOL/L
PROT SERPL-MCNC: 7.1 G/DL
RBC # BLD AUTO: 4.83 M/UL
SODIUM SERPL-SCNC: 138 MMOL/L
WBC # BLD AUTO: 26.04 K/UL

## 2017-11-02 PROCEDURE — 36415 COLL VENOUS BLD VENIPUNCTURE: CPT

## 2017-11-02 PROCEDURE — 83615 LACTATE (LD) (LDH) ENZYME: CPT

## 2017-11-02 PROCEDURE — 80053 COMPREHEN METABOLIC PANEL: CPT

## 2017-11-02 PROCEDURE — 99499 UNLISTED E&M SERVICE: CPT | Mod: S$GLB,,, | Performed by: INTERNAL MEDICINE

## 2017-11-02 PROCEDURE — 99214 OFFICE O/P EST MOD 30 MIN: CPT | Mod: S$GLB,,, | Performed by: INTERNAL MEDICINE

## 2017-11-02 PROCEDURE — 85027 COMPLETE CBC AUTOMATED: CPT

## 2017-11-02 PROCEDURE — 99999 PR PBB SHADOW E&M-EST. PATIENT-LVL III: CPT | Mod: PBBFAC,,, | Performed by: INTERNAL MEDICINE

## 2017-11-02 NOTE — TELEPHONE ENCOUNTER
Returned call to pt.  Pt unavailable.   Left message for pt to return call.   Callback number provided.         ----- Message from Rm Fernando sent at 11/2/2017  4:03 PM CDT -----  Contact: Pt   Will like letter stating she can not sit with her mother anymore because she has CLL    Will like letter emailed     E-mail::[email or web address]    Contact::[phone]

## 2017-11-02 NOTE — PROGRESS NOTES
Subjective:       Patient ID: Jorge Montgomery is a 76 y.o. female.    Chief Complaint: Follow-up    HPI     Returns for follow-up of CLL.  Reports overall doing well- she has some fatigue she attributes to assisting in care in 12 hours shifts for her 92 yo mother (who has dementia and recently had a surgery required post hip fracture from fall). This care has been physically difficult for her to manage.    No fevers, chills or recurrent infections. No night sweats.  No lymphadenopathy.  No bleeding or bruising.    Review of Systems   Constitutional: Negative for activity change, appetite change, chills, diaphoresis, fatigue, fever and unexpected weight change.   HENT: Negative for congestion, ear pain, mouth sores, nosebleeds, postnasal drip, rhinorrhea, sinus pressure, sneezing, sore throat, tinnitus and voice change.    Eyes: Negative for redness and visual disturbance.   Respiratory: Negative for cough, chest tightness, shortness of breath and wheezing.    Cardiovascular: Negative for chest pain, palpitations and leg swelling.   Gastrointestinal: Negative for abdominal distention, abdominal pain, blood in stool, constipation, diarrhea, nausea and vomiting.   Genitourinary: Negative for decreased urine volume, difficulty urinating, dysuria, frequency, hematuria, urgency and vaginal bleeding.   Musculoskeletal: Negative for arthralgias, back pain, joint swelling, myalgias, neck pain and neck stiffness.   Skin: Negative for color change, pallor, rash and wound.   Allergic/Immunologic: Negative for environmental allergies.   Neurological: Negative for dizziness, weakness, light-headedness, numbness and headaches.   Hematological: Negative for adenopathy. Does not bruise/bleed easily.   Psychiatric/Behavioral: Negative for confusion, decreased concentration and dysphoric mood.       Objective:      Physical Exam   Constitutional: She is oriented to person, place, and time. She appears well-developed and  well-nourished. No distress.   Presents alone   HENT:   Head: Normocephalic and atraumatic.   Right Ear: External ear normal.   Left Ear: External ear normal.   Nose: Nose normal.   Mouth/Throat: Oropharynx is clear and moist. No oropharyngeal exudate.   Eyes: Conjunctivae and EOM are normal. Pupils are equal, round, and reactive to light. Right eye exhibits no discharge. Left eye exhibits no discharge. No scleral icterus.   Neck: Normal range of motion. Neck supple. No JVD present. No tracheal deviation present. No thyromegaly present.   Cardiovascular: Normal rate, regular rhythm, normal heart sounds and intact distal pulses.  Exam reveals no gallop and no friction rub.    No murmur heard.  Pulmonary/Chest: Effort normal and breath sounds normal. No stridor. No respiratory distress. She has no wheezes. She has no rales. She exhibits no tenderness.   Abdominal: Soft. Bowel sounds are normal. She exhibits no distension and no mass. There is no tenderness. There is no rebound and no guarding.   No hepatosplenomegaly   Musculoskeletal: Normal range of motion. She exhibits no edema or tenderness.   Lymphadenopathy:        Head (right side): No submandibular, no preauricular, no posterior auricular and no occipital adenopathy present.        Head (left side): No submandibular, no preauricular, no posterior auricular and no occipital adenopathy present.     She has no cervical adenopathy.        Right cervical: No superficial cervical, no deep cervical and no posterior cervical adenopathy present.       Left cervical: No superficial cervical, no deep cervical and no posterior cervical adenopathy present.     She has no axillary adenopathy.        Right: No inguinal and no supraclavicular adenopathy present.        Left: No inguinal and no supraclavicular adenopathy present.   Neurological: She is alert and oriented to person, place, and time. No cranial nerve deficit. She exhibits normal muscle tone. Coordination normal.    Skin: Skin is warm and dry. No rash noted. She is not diaphoretic. No erythema. No pallor.   Psychiatric: She has a normal mood and affect. Her behavior is normal. Judgment and thought content normal.   Nursing note and vitals reviewed.    Labs- reviewed  Assessment:       1. Chronic lymphocytic leukemia        Plan:     1. Parameters stable  Needs continued surveillance  She is aware to call us for any issues and signs and symptoms of possible concern.

## 2017-11-02 NOTE — TELEPHONE ENCOUNTER
Message fwd to MD.         ----- Message from Chela Tavares sent at 11/2/2017  3:40 PM CDT -----  Contact: pt  Pt states she changed her mind and she would like the letter stating she shouldn't sit with her mother. Pt asks that the nurse call or leave a message that the letter was mailed. Pt can be reached at [phone] or [phone]. Pt asks it is mailed to the address on file.

## 2017-11-02 NOTE — LETTER
November 3, 2017    Jorge Montgomery  [address]  [place] [address]             [facility]  [address]  [place] [address]  Phone: [phone]     To Whom it May Concern:      I have advised Mrs. Jorge Montgomery that she has a serious medical condition and it is ill advised for her to participate in the physical care of her mother at this time. She is safe to spend time with her and provide emotional support but is not able to manage physical care nor should she be exposed to potential infectious risk.   Her diagnosis puts her risk of infection and it is not recommended that she compromise her clinical situation at this time.   We will be monitoring her closely and notify her of any changes in recommendations as they come up.      Sincerely,      Rema Merchant MD

## 2017-11-03 ENCOUNTER — TELEPHONE (OUTPATIENT)
Dept: HEMATOLOGY/ONCOLOGY | Facility: CLINIC | Age: 77
End: 2017-11-03

## 2017-11-03 NOTE — TELEPHONE ENCOUNTER
Returned call to pt.   Informed pt that Dr. Merchant working on letter.   Pt stated that she wanted to let Dr. Merchant know that family called her names and that her family doesn't believe that she wants to take care of her mother and that letter is an excuse.   Informed pt would have Dr. Merchant call to speak with since pt upset with family distress.   Pt verbalized understanding.       Dr. Merchant called pt.       ----- Message from Rehana Hensley sent at 11/3/2017  2:29 PM CDT -----  Contact: Pt  Pt calling regarding a letter stating that she can't take care of her mother anymore  She would like to speak directly to     Pt call back number [phone]

## 2017-11-17 ENCOUNTER — TELEPHONE (OUTPATIENT)
Dept: INTERNAL MEDICINE | Facility: CLINIC | Age: 77
End: 2017-11-17

## 2017-11-17 NOTE — TELEPHONE ENCOUNTER
----- Message from Kacy Martinez sent at 11/16/2017  4:54 PM CST -----  Contact: self/[phone] after 4:00 pm  Pt called in regards to a missed call from the office.      Please advise

## 2017-11-22 ENCOUNTER — TELEPHONE (OUTPATIENT)
Dept: HEMATOLOGY/ONCOLOGY | Facility: CLINIC | Age: 77
End: 2017-11-22

## 2017-11-22 NOTE — TELEPHONE ENCOUNTER
"Returned call to pt.   Pt stated that Dr. Merchant had spoken to sister in law regarding care of mother and had informed sister in law that pt was "dying"---pt concerned with this comment.   Informed pt that parameters have been stable and don't think this is the case literally.   Pt stated that sister in law threatened pt that she was going to "report Dr. Merchant for HIPPAA."   Pt concerned about Dr. Merchant---informed pt of what HIPPAA entitles and that if pt gave permission for Dr. Merchant to speak to and regarding diagnosis, Dr. Merchant can not get in trouble.   Pt verbalized understanding and thanked nurse.     Message routed to Dr. Merchant.       ----- Message from Rm Fernando sent at 11/22/2017 12:12 PM CST -----  Contact: Pt   Will like a call from Natalie regarding verifying info that was told to her     Contact::[phone]    "

## 2017-12-13 ENCOUNTER — OFFICE VISIT (OUTPATIENT)
Dept: INTERNAL MEDICINE | Facility: CLINIC | Age: 77
End: 2017-12-13
Payer: MEDICARE

## 2017-12-13 VITALS
DIASTOLIC BLOOD PRESSURE: 60 MMHG | HEART RATE: 62 BPM | BODY MASS INDEX: 27.18 KG/M2 | SYSTOLIC BLOOD PRESSURE: 122 MMHG | HEIGHT: 64 IN | WEIGHT: 159.19 LBS

## 2017-12-13 DIAGNOSIS — Z00.00 ENCOUNTER FOR PREVENTIVE HEALTH EXAMINATION: Primary | ICD-10-CM

## 2017-12-13 DIAGNOSIS — K21.9 GASTROESOPHAGEAL REFLUX DISEASE WITHOUT ESOPHAGITIS: ICD-10-CM

## 2017-12-13 DIAGNOSIS — M18.12 PRIMARY OSTEOARTHRITIS OF FIRST CARPOMETACARPAL JOINT OF LEFT HAND: ICD-10-CM

## 2017-12-13 DIAGNOSIS — C91.10 CHRONIC LYMPHOCYTIC LEUKEMIA: ICD-10-CM

## 2017-12-13 DIAGNOSIS — H90.5 SENSORINEURAL HEARING LOSS (SNHL), UNSPECIFIED LATERALITY: ICD-10-CM

## 2017-12-13 DIAGNOSIS — R19.4 BOWEL HABIT CHANGES: ICD-10-CM

## 2017-12-13 DIAGNOSIS — E78.5 HYPERLIPIDEMIA, UNSPECIFIED HYPERLIPIDEMIA TYPE: ICD-10-CM

## 2017-12-13 DIAGNOSIS — F32.0 MAJOR DEPRESSIVE DISORDER, SINGLE EPISODE, MILD: ICD-10-CM

## 2017-12-13 PROCEDURE — G0439 PPPS, SUBSEQ VISIT: HCPCS | Mod: S$GLB,,, | Performed by: NURSE PRACTITIONER

## 2017-12-13 PROCEDURE — 99999 PR PBB SHADOW E&M-EST. PATIENT-LVL IV: CPT | Mod: PBBFAC,,, | Performed by: NURSE PRACTITIONER

## 2017-12-13 PROCEDURE — 99499 UNLISTED E&M SERVICE: CPT | Mod: S$GLB,,, | Performed by: NURSE PRACTITIONER

## 2017-12-13 NOTE — PATIENT INSTRUCTIONS
Counseling and Referral of Other Preventative  (Italic type indicates deductible and co-insurance are waived)    Patient Name: Jorge Montgomery  Today's Date: 12/13/2017      SERVICE LIMITATIONS RECOMMENDATION    Vaccines    · Pneumococcal (once after 65)    · Influenza (annually)    · Hepatitis B (if medium/high risk)    · Prevnar 13      Hepatitis B medium/high risk factors:       - End-stage renal disease       - Hemophiliacs who received Factor VII or         IX concentrates       - Clients of institutions for the mentally             retarded       - Persons who live in the same house as          a HepB carrier       - Homosexual men       - Illicit injectable drug abusers     Pneumococcal: Done, no repeat necessary     Influenza: Done, repeat in one year     Hepatitis B: N/A     Prevnar 13: Done, no repeat necessary    Mammogram (biennial age 50-74)  Annually (age 40 or over)  Done this year, repeat every year    Pap (up to age 70 and after 70 if unknown history or abnormal study last 10 years)    N/A     The USPSTF recommends against screening for cervical cancer in women older than age 65 years who have had adequate prior screening and are not otherwise at high risk for cervical cancer.      Colorectal cancer screening (to age 75)    · Fecal occult blood test (annual)  · Flexible sigmoidoscopy (5y)  · Screening colonoscopy (10y)  · Barium enema   Last done 2017, recommend to repeat every 5  years    Diabetes self-management training (no USPSTF recommendations)  Requires referral by treating physician for patient with diabetes or renal disease. 10 hours of initial DSMT sessions of no less than 30 minutes each in a continuous 12-month period. 2 hours of follow-up DSMT in subsequent years.  N/A    Bone mass measurements (age 65 & older, biennial)  Requires diagnosis related to osteoporosis or estrogen deficiency. Biennial benefit unless patient has history of long-term glucocorticoid  Last done 3/8/2017,  recommend to repeat every 0  no repeat necessary    Glaucoma screening (no USPSTF recommendation)  Diabetes mellitus, family history   , age 50 or over    American, age 65 or over  Recommend yearly    Medical nutrition therapy for diabetes or renal disease (no recommended schedule)  Requires referral by treating physician for patient with diabetes or renal disease or kidney transplant within the past 3 years.  Can be provided in same year as diabetes self-management training (DSMT), and CMS recommends medical nutrition therapy take place after DSMT. Up to 3 hours for initial year and 2 hours in subsequent years. N/a    Cardiovascular screening blood tests (every 5 years)  · Fasting lipid panel  Order as a panel if possible  Done this year, repeat every year    Diabetes screening tests (at least every 3 years, Medicare covers annually or at 6-month intervals for prediabetic patients)  · Fasting blood sugar (FBS) or glucose tolerance test (GTT)  Patient must be diagnosed with one of the following:       - Hypertension       - Dyslipidemia       - Obesity (BMI 30kg/m2)       - Previous elevated impaired FBS or GTT       ... or any two of the following:       - Overweight (BMI 25 but <30)       - Family history of diabetes       - Age 65 or older       - History of gestational diabetes or birth of baby weighing more than 9 pounds  Done this year, repeat every year    HIV screening (annually for increased risk patients)  · HIV-1 and HIV-2 by EIA, or DIPTI, rapid antibody test or oral mucosa transudate  Patients must be at increased risk for HIV infection per USPSTF guidelines or pregnant. Tests covered annually for patient at increased risk or as requested by the patient. Pregnant patients may receive up to 3 tests during pregnancy.  Risks discussed, screening is not recommended    Smoking cessation counseling (up to 8 sessions per year)  Patients must be asymptomatic of tobacco-related conditions  to receive as a preventative service.  Non-smoker    Subsequent annual wellness visit  At least 12 months since last AWV  Return in one year     The following information is provided to all patients.  This information is to help you find resources for any of the problems found today that may be affecting your health:                Living healthy guide: www.[facility].[place].[facility]      Understanding Diabetes: www.diabetes.org      Eating healthy: www.cdc.gov/healthyweight      CDC home safety checklist: www.cdc.gov/steadi/patient.html      Agency on Aging: www.goea.[place].[facility]      Alcoholics anonymous (AA): www.aa.org      Physical Activity: www.jensen.nih.gov/re1jimz      Tobacco use: www.quitwithusla.org

## 2017-12-14 NOTE — PROGRESS NOTES
"Jorge Montgomery presented for a  Medicare AWV and comprehensive Health Risk Assessment today. The following components were reviewed and updated:    · Medical history  · Family History  · Social history  · Allergies and Current Medications  · Health Risk Assessment  · Health Maintenance  · Care Team     ** See Completed Assessments for Annual Wellness Visit within the encounter summary.**       The following assessments were completed:  · Living Situation  · CAGE  · Depression Screening  · Timed Get Up and Go  · Whisper Test  · Cognitive Function Screening  · Nutrition Screening  · ADL Screening  · PAQ Screening    Vitals:    12/13/17 1008   BP: 122/60   Pulse: 62   Weight: 72.2 kg (159 lb 2.8 oz)   Height: 5' 4" (1.626 m)     Body mass index is 27.32 kg/m².  Physical Exam   Constitutional: She is oriented to person, place, and time. She appears well-developed.   HENT:   Head: Normocephalic and atraumatic.   Nose: Nose normal.   Eyes: Conjunctivae and EOM are normal.   Cardiovascular: Normal rate, regular rhythm, normal heart sounds and intact distal pulses.    No murmur heard.  Pulmonary/Chest: Effort normal and breath sounds normal.   Musculoskeletal: Normal range of motion.   Neurological: She is alert and oriented to person, place, and time.   Skin: Skin is warm and dry.   Psychiatric: She has a normal mood and affect. Her behavior is normal. Judgment and thought content normal.   Nursing note and vitals reviewed.        Diagnoses and health risks identified today and associated recommendations/orders:    1. Encounter for preventive health examination  Assessment performed. Health maintenance updated. Chart review completed.  -Discussed Tetanus    2. Major depressive disorder, single episode, mild  Chronic. Stable with current regimen. Followed by PCP.    3. Chronic lymphocytic leukemia  Chronic. Stable. Followed by Hematology Oncology.    4. Sensorineural hearing loss (SNHL), unspecified laterality  Stable. " Followed by Audiology.    5. Hyperlipidemia, unspecified hyperlipidemia type  Chronic. Stable, Not currently on statin therapy. Followed by PCP.    6. Gastroesophageal reflux disease without esophagitis  Stable. Followed by Gastroenterology.    7. Bowel habit changes  Stable. Followed by Gastroenterology.    8. Primary osteoarthritis of first carpometacarpal joint of left hand  Stable. Followed by PCP.      Provided Patel with a 5-10 year written screening schedule and personal prevention plan. Recommendations were developed using the USPSTF age appropriate recommendations. Education, counseling, and referrals were provided as needed. After Visit Summary printed and given to patient which includes a list of additional screenings\tests needed.    Return for follow up with Primary Care Provider as instructed, ;sooner if problems, HRA in 1 year.    JOSE Tosacno

## 2018-02-27 ENCOUNTER — TELEPHONE (OUTPATIENT)
Dept: HEMATOLOGY/ONCOLOGY | Facility: CLINIC | Age: 78
End: 2018-02-27

## 2018-02-27 NOTE — TELEPHONE ENCOUNTER
Message fwd to .       ----- Message from Evelia Leonard sent at 2/27/2018 12:34 PM CST -----  Contact: PT  PT called to speak to the nurse to schedule an appt and would like a call back.    Pt can be reached at [phone].    Thanks

## 2018-02-27 NOTE — TELEPHONE ENCOUNTER
----- Message from Natalie Damian sent at 2/27/2018 12:49 PM CST -----  Contact: PT      ----- Message -----  From: Evelia Leonard  Sent: 2/27/2018  12:34 PM  To: Mayur ARTHUR Staff    PT called to speak to the nurse to schedule an appt and would like a call back.    Pt can be reached at [phone].    Thanks

## 2018-03-14 ENCOUNTER — TELEPHONE (OUTPATIENT)
Dept: INTERNAL MEDICINE | Facility: CLINIC | Age: 78
End: 2018-03-14

## 2018-03-14 NOTE — TELEPHONE ENCOUNTER
----- Message from Funmilayo Medeiros sent at 3/14/2018  3:38 PM CDT -----  Contact: Patient [phone]  Patient is returning a missed call.    Please call and advise.    Thank you

## 2018-03-27 ENCOUNTER — LAB VISIT (OUTPATIENT)
Dept: LAB | Facility: HOSPITAL | Age: 78
End: 2018-03-27
Attending: INTERNAL MEDICINE
Payer: MEDICARE

## 2018-03-27 DIAGNOSIS — E78.5 HYPERLIPIDEMIA, UNSPECIFIED HYPERLIPIDEMIA TYPE: ICD-10-CM

## 2018-03-27 LAB
ALBUMIN SERPL BCP-MCNC: 4 G/DL
ALP SERPL-CCNC: 65 U/L
ALT SERPL W/O P-5'-P-CCNC: 36 U/L
ANION GAP SERPL CALC-SCNC: 10 MMOL/L
ANISOCYTOSIS BLD QL SMEAR: SLIGHT
AST SERPL-CCNC: 35 U/L
BASOPHILS # BLD AUTO: 0.1 K/UL
BASOPHILS NFR BLD: 0.4 %
BILIRUB SERPL-MCNC: 0.7 MG/DL
BUN SERPL-MCNC: 17 MG/DL
BURR CELLS BLD QL SMEAR: ABNORMAL
CALCIUM SERPL-MCNC: 9.9 MG/DL
CHLORIDE SERPL-SCNC: 101 MMOL/L
CHOLEST SERPL-MCNC: 243 MG/DL
CHOLEST/HDLC SERPL: 3.9 {RATIO}
CO2 SERPL-SCNC: 29 MMOL/L
CREAT SERPL-MCNC: 0.8 MG/DL
DIFFERENTIAL METHOD: ABNORMAL
EOSINOPHIL # BLD AUTO: 0.1 K/UL
EOSINOPHIL NFR BLD: 0.5 %
ERYTHROCYTE [DISTWIDTH] IN BLOOD BY AUTOMATED COUNT: 13.9 %
EST. GFR  (AFRICAN AMERICAN): >60 ML/MIN/1.73 M^2
EST. GFR  (NON AFRICAN AMERICAN): >60 ML/MIN/1.73 M^2
GLUCOSE SERPL-MCNC: 126 MG/DL
HCT VFR BLD AUTO: 47.8 %
HDLC SERPL-MCNC: 62 MG/DL
HDLC SERPL: 25.5 %
HGB BLD-MCNC: 15.1 G/DL
LDLC SERPL CALC-MCNC: 151.8 MG/DL
LYMPHOCYTES # BLD AUTO: 23.4 K/UL
LYMPHOCYTES NFR BLD: 83.8 %
MCH RBC QN AUTO: 29.4 PG
MCHC RBC AUTO-ENTMCNC: 31.6 G/DL
MCV RBC AUTO: 93 FL
MONOCYTES # BLD AUTO: 0.5 K/UL
MONOCYTES NFR BLD: 1.6 %
NEUTROPHILS # BLD AUTO: 3.8 K/UL
NEUTROPHILS NFR BLD: 13.5 %
NONHDLC SERPL-MCNC: 181 MG/DL
NRBC BLD-RTO: 0 /100 WBC
OVALOCYTES BLD QL SMEAR: ABNORMAL
PLATELET # BLD AUTO: 286 K/UL
PLATELET BLD QL SMEAR: ABNORMAL
PMV BLD AUTO: 10.4 FL
POIKILOCYTOSIS BLD QL SMEAR: SLIGHT
POTASSIUM SERPL-SCNC: 4.5 MMOL/L
PROT SERPL-MCNC: 6.9 G/DL
RBC # BLD AUTO: 5.14 M/UL
SODIUM SERPL-SCNC: 140 MMOL/L
TRIGL SERPL-MCNC: 146 MG/DL
TSH SERPL DL<=0.005 MIU/L-ACNC: 1.78 UIU/ML
WBC # BLD AUTO: 27.96 K/UL

## 2018-03-27 PROCEDURE — 36415 COLL VENOUS BLD VENIPUNCTURE: CPT

## 2018-03-27 PROCEDURE — 80053 COMPREHEN METABOLIC PANEL: CPT

## 2018-03-27 PROCEDURE — 84443 ASSAY THYROID STIM HORMONE: CPT

## 2018-03-27 PROCEDURE — 85027 COMPLETE CBC AUTOMATED: CPT

## 2018-03-27 PROCEDURE — 85007 BL SMEAR W/DIFF WBC COUNT: CPT

## 2018-03-27 PROCEDURE — 80061 LIPID PANEL: CPT

## 2018-03-27 PROCEDURE — 85060 BLOOD SMEAR INTERPRETATION: CPT | Mod: ,,, | Performed by: PATHOLOGY

## 2018-03-28 LAB — PATH REV BLD -IMP: NORMAL

## 2018-03-29 ENCOUNTER — TELEPHONE (OUTPATIENT)
Dept: FAMILY MEDICINE | Facility: CLINIC | Age: 78
End: 2018-03-29

## 2018-03-29 DIAGNOSIS — Z12.31 SCREENING MAMMOGRAM, ENCOUNTER FOR: Primary | ICD-10-CM

## 2018-03-29 NOTE — TELEPHONE ENCOUNTER
----- Message from Nicolette Najera sent at 3/28/2018  4:17 PM CDT -----  Contact: Pt [phone]  Patient would like to schedule their annual mammogram appointment, please enter the order and contact the patient to schedule.

## 2018-04-03 ENCOUNTER — OFFICE VISIT (OUTPATIENT)
Dept: INTERNAL MEDICINE | Facility: CLINIC | Age: 78
End: 2018-04-03
Payer: MEDICARE

## 2018-04-03 VITALS
DIASTOLIC BLOOD PRESSURE: 70 MMHG | SYSTOLIC BLOOD PRESSURE: 118 MMHG | HEIGHT: 64 IN | WEIGHT: 159.88 LBS | OXYGEN SATURATION: 98 % | BODY MASS INDEX: 27.29 KG/M2 | HEART RATE: 73 BPM

## 2018-04-03 DIAGNOSIS — K21.9 GASTROESOPHAGEAL REFLUX DISEASE WITHOUT ESOPHAGITIS: ICD-10-CM

## 2018-04-03 DIAGNOSIS — E78.5 HYPERLIPIDEMIA, UNSPECIFIED HYPERLIPIDEMIA TYPE: ICD-10-CM

## 2018-04-03 DIAGNOSIS — C91.10 CHRONIC LYMPHOCYTIC LEUKEMIA: Primary | ICD-10-CM

## 2018-04-03 DIAGNOSIS — F32.0 MAJOR DEPRESSIVE DISORDER, SINGLE EPISODE, MILD: ICD-10-CM

## 2018-04-03 DIAGNOSIS — J30.1 NON-SEASONAL ALLERGIC RHINITIS DUE TO POLLEN, UNSPECIFIED CHRONICITY: ICD-10-CM

## 2018-04-03 PROCEDURE — 99999 PR PBB SHADOW E&M-EST. PATIENT-LVL III: CPT | Mod: PBBFAC,,, | Performed by: INTERNAL MEDICINE

## 2018-04-03 PROCEDURE — 99214 OFFICE O/P EST MOD 30 MIN: CPT | Mod: S$GLB,,, | Performed by: INTERNAL MEDICINE

## 2018-04-03 RX ORDER — BUPROPION HYDROCHLORIDE 150 MG/1
150 TABLET ORAL DAILY
Qty: 30 TABLET | Refills: 11 | Status: SHIPPED | OUTPATIENT
Start: 2018-04-03 | End: 2018-04-03 | Stop reason: SDUPTHER

## 2018-04-03 RX ORDER — AMOXICILLIN 875 MG/1
875 TABLET, FILM COATED ORAL EVERY 12 HOURS
Qty: 20 TABLET | Refills: 0 | Status: SHIPPED | OUTPATIENT
Start: 2018-04-03 | End: 2018-04-13

## 2018-04-03 RX ORDER — CITALOPRAM 40 MG/1
TABLET, FILM COATED ORAL
Qty: 90 TABLET | Refills: 3 | Status: SHIPPED | OUTPATIENT
Start: 2018-04-03 | End: 2019-07-22 | Stop reason: SDUPTHER

## 2018-04-03 RX ORDER — BUPROPION HYDROCHLORIDE 150 MG/1
150 TABLET ORAL DAILY
Qty: 30 TABLET | Refills: 11 | Status: SHIPPED | OUTPATIENT
Start: 2018-04-03 | End: 2018-10-05

## 2018-04-03 RX ORDER — AMOXICILLIN 875 MG/1
875 TABLET, FILM COATED ORAL EVERY 12 HOURS
Qty: 20 TABLET | Refills: 0 | Status: SHIPPED | OUTPATIENT
Start: 2018-04-03 | End: 2018-04-03 | Stop reason: SDUPTHER

## 2018-04-03 NOTE — PROGRESS NOTES
CHIEF COMPLAINT: Follow up of CLL, stress, reflux, allergies    HISTORY OF PRESENT ILLNESS: This is a 76-year-old woman who presents for follow up of above.      Her abdominal pain has resolved. Her interstitial cystitis is better. She has been watching her diet closely.  She is no longer drinking alcohol and soda beverages which she thinks triggered her flare last fall.  No dysuria or hematuria     Her WBC for her CLL has been stable. No fever, chills, night sweatts, weight loss. She saw Dr Merchant 17    Her stress has been worse.  She is helping manage her 90 year old mother with dementia.  Her  continues to give her stress. She feels depressed.  NO homicidal or suicidal ideations.  She is taking Celexa 40 mg 1 tablet daliy. She denies any overwhelming insomnia, anxiety or depression. Reflux is controlled on omeprazole 20 mg 2 tablets daily.       She takes loratadine 10 mg daily for allergies. . She takes meclizine 25 mg as needed for dizziness. NO dizziness.  She has worsened sinus congestion with green rhinorrhea the last 4 days.  She has had hoarseness.  NO fever, chills, shortness of breath, wheezing      No fever, chills, visual issues, hearing issues, chest pain, shortness of breath, nausea, vomiting, constipation, diarrhea, dysuria, hematuria, joint pain, muscle pain, rashes, headaches, insomnia.        PAST MEDICAL HISTORY:   1. Reflux.   2. Hyperlipidemia.   3. Interstitial cystitis - controlled with diet.   4. Chronic lymphocytic leukemia diagnosed . No need for treatment at this point in time.   5. Situational stress with anxiety and depression.   6. History of back pain in  - resolved.   7. History of liver biopsy  which revealed a hemangioma.     PAST SURGICAL HISTORY:   1. .   2. Hysterectomy.   3. Cholecystectomy.   4. Ganglion cyst removed from the left foot.     SOCIAL HISTORY: She is a past smoker, quit smoking 20 years ago; she   smoked a pack per week on and off  for 5-10 years. No alcohol.    with two children.     MEDICATIONS and ALLERGIES: Updated on EPIC     PHYSICAL EXAMINATION:           General: Alert, oriented. No apparent distress. Affect within normal limits.   Conjunctivae anicteric. Tympanic membranes clear. Oropharynx erythematus with exudate   Neck supple. No cervical lymphadenopathy  Respiratory effort normal. Lungs clear to auscultation.   Heart: Regular rate and rhythm without murmurs, gallops or rubs.   No lower extremity edema.       Labs 17 reviewed  MMG 3/8/17 reviewed  BMD 3/8/17- normal.     Labs 3/27/18 reviewed      ASSESSMENT AND PLAN:   1. Interstitial cystitis - stable  2. Reflux - controlled   3. Situational stress/major depression - Add Wellbutrin  mg daily to celexa to 40 mg 1 tablet daily  4. CLL -stable  5. History of thyroid nodules - thyroid ultrasound stable   6. Hyperlipidemia -continue to work on diet, exercise and weight loss and flax seed oil. Does not want lipid lowering medications    7. Acute bacterial sinusitis- amoxil 875 mg twice daily  8. I'll see her back in 3 months, sooner if problems arise.        Influenza , Prevnar 10/15, Pneumvax 10/14  MMG 3/17 - ordered  BMD 3/17 - normal and no change  Colonoscopy 2013 -diverticuli - otherwise normal due 10 years - Brother  of colon cancer at age 62, will do in 2018

## 2018-04-10 ENCOUNTER — HOSPITAL ENCOUNTER (OUTPATIENT)
Dept: RADIOLOGY | Facility: HOSPITAL | Age: 78
Discharge: HOME | End: 2018-04-10
Attending: INTERNAL MEDICINE
Payer: MEDICARE

## 2018-04-10 VITALS — HEIGHT: 64 IN | BODY MASS INDEX: 27.14 KG/M2 | WEIGHT: 159 LBS

## 2018-04-10 DIAGNOSIS — Z12.31 SCREENING MAMMOGRAM, ENCOUNTER FOR: ICD-10-CM

## 2018-04-10 PROCEDURE — 77063 BREAST TOMOSYNTHESIS BI: CPT | Mod: 26,,, | Performed by: RADIOLOGY

## 2018-04-10 PROCEDURE — 77067 SCR MAMMO BI INCL CAD: CPT | Mod: 26,,, | Performed by: RADIOLOGY

## 2018-04-10 PROCEDURE — 77067 SCR MAMMO BI INCL CAD: CPT | Mod: TC

## 2018-04-23 NOTE — PROGRESS NOTES
Subjective:       Patient ID: Jorge Montgomery is a 77 y.o. female.    Chief Complaint: No chief complaint on file.    HPI     Reports sore throat that began over the weekend  + cough with phlegm production  This is her second bout with a URI (last over Easter)    Returns for follow-up of CLL.  Reports overall doing well- she has some fatigue she attributes to assisting in care in 12 hours shifts for her 92 yo mother (who has dementia and recently had a surgery required post hip fracture from fall). This care has been physically difficult for her to manage.     No fevers, chills or recurrent infections. No night sweats.  No weight loss.  No lymphadenopathy.  No bleeding or bruising.    Review of Systems   Constitutional: Negative for activity change, appetite change, chills, diaphoresis, fatigue, fever and unexpected weight change.   HENT: Negative for congestion, ear pain, mouth sores, nosebleeds, postnasal drip, rhinorrhea, sinus pressure, sneezing, sore throat, tinnitus and voice change.    Eyes: Negative for redness and visual disturbance.   Respiratory: Negative for cough, chest tightness, shortness of breath and wheezing.    Cardiovascular: Negative for chest pain, palpitations and leg swelling.   Gastrointestinal: Negative for abdominal distention, abdominal pain, blood in stool, constipation, diarrhea, nausea and vomiting.   Genitourinary: Negative for decreased urine volume, difficulty urinating, dysuria, frequency, hematuria, urgency and vaginal bleeding.   Musculoskeletal: Negative for arthralgias, back pain, joint swelling, myalgias, neck pain and neck stiffness.   Skin: Negative for color change, pallor, rash and wound.   Allergic/Immunologic: Negative for environmental allergies.   Neurological: Negative for dizziness, weakness, light-headedness, numbness and headaches.   Hematological: Negative for adenopathy. Does not bruise/bleed easily.   Psychiatric/Behavioral: Negative for confusion, decreased  concentration and dysphoric mood.       Objective:      Physical Exam   Constitutional: She is oriented to person, place, and time. She appears well-developed and well-nourished. No distress.   Presents alone   HENT:   Head: Normocephalic and atraumatic.   Right Ear: External ear normal.   Left Ear: External ear normal.   Nose: Nose normal.   Mouth/Throat: Oropharynx is clear and moist. No oropharyngeal exudate.   Eyes: Conjunctivae and EOM are normal. Pupils are equal, round, and reactive to light. Right eye exhibits no discharge. Left eye exhibits no discharge. No scleral icterus.   Neck: Normal range of motion. Neck supple. No JVD present. No tracheal deviation present. No thyromegaly present.   Cardiovascular: Normal rate, regular rhythm, normal heart sounds and intact distal pulses.  Exam reveals no gallop and no friction rub.    No murmur heard.  Pulmonary/Chest: Effort normal and breath sounds normal. No stridor. No respiratory distress. She has no wheezes. She has no rales. She exhibits no tenderness.   Abdominal: Soft. Bowel sounds are normal. She exhibits no distension and no mass. There is no tenderness. There is no rebound and no guarding.   No hepatosplenomegaly   Musculoskeletal: Normal range of motion. She exhibits no edema or tenderness.   Lymphadenopathy:        Head (right side): No submandibular, no preauricular, no posterior auricular and no occipital adenopathy present.        Head (left side): No submandibular, no preauricular, no posterior auricular and no occipital adenopathy present.     She has no cervical adenopathy.        Right cervical: No superficial cervical, no deep cervical and no posterior cervical adenopathy present.       Left cervical: No superficial cervical, no deep cervical and no posterior cervical adenopathy present.     She has no axillary adenopathy.        Right: No inguinal and no supraclavicular adenopathy present.        Left: No inguinal and no supraclavicular  adenopathy present.   Neurological: She is alert and oriented to person, place, and time. No cranial nerve deficit. She exhibits normal muscle tone. Coordination normal.   Skin: Skin is warm and dry. No rash noted. She is not diaphoretic. No erythema. No pallor.   Psychiatric: She has a normal mood and affect. Her behavior is normal. Judgment and thought content normal.   Nursing note and vitals reviewed.      Assessment:       1. Upper respiratory tract infection, unspecified type    2. Chronic lymphocytic leukemia        Plan:     1. Zpak  Reminded to call for any further infections  2. We again reviewed that her parameters are stable but indications that would warrant treatment  Wbc is stable, she is not anemic or thrombocytopenic  She knows to call for any further infections  Knows to call for any issues        Distress Screening Results: Psychosocial Distress screening score of Distress Score: 0 noted and reviewed. No intervention indicated.

## 2018-04-24 ENCOUNTER — OFFICE VISIT (OUTPATIENT)
Dept: HEMATOLOGY/ONCOLOGY | Facility: CLINIC | Age: 78
End: 2018-04-24
Payer: MEDICARE

## 2018-04-24 ENCOUNTER — LAB VISIT (OUTPATIENT)
Dept: LAB | Facility: HOSPITAL | Age: 78
End: 2018-04-24
Attending: INTERNAL MEDICINE
Payer: MEDICARE

## 2018-04-24 VITALS
TEMPERATURE: 99 F | DIASTOLIC BLOOD PRESSURE: 61 MMHG | BODY MASS INDEX: 27.33 KG/M2 | RESPIRATION RATE: 18 BRPM | HEIGHT: 64 IN | WEIGHT: 160.06 LBS | HEART RATE: 65 BPM | SYSTOLIC BLOOD PRESSURE: 132 MMHG

## 2018-04-24 DIAGNOSIS — C91.10 CHRONIC LYMPHOCYTIC LEUKEMIA: ICD-10-CM

## 2018-04-24 DIAGNOSIS — J06.9 UPPER RESPIRATORY TRACT INFECTION, UNSPECIFIED TYPE: Primary | ICD-10-CM

## 2018-04-24 LAB
ALBUMIN SERPL BCP-MCNC: 3.6 G/DL
ALP SERPL-CCNC: 69 U/L
ALT SERPL W/O P-5'-P-CCNC: 27 U/L
ANION GAP SERPL CALC-SCNC: 10 MMOL/L
ANISOCYTOSIS BLD QL SMEAR: SLIGHT
AST SERPL-CCNC: 26 U/L
BASOPHILS NFR BLD: 0 %
BILIRUB SERPL-MCNC: 0.4 MG/DL
BUN SERPL-MCNC: 13 MG/DL
CALCIUM SERPL-MCNC: 9.3 MG/DL
CHLORIDE SERPL-SCNC: 102 MMOL/L
CO2 SERPL-SCNC: 28 MMOL/L
CREAT SERPL-MCNC: 0.8 MG/DL
DIFFERENTIAL METHOD: ABNORMAL
EOSINOPHIL NFR BLD: 0 %
ERYTHROCYTE [DISTWIDTH] IN BLOOD BY AUTOMATED COUNT: 13.6 %
EST. GFR  (AFRICAN AMERICAN): >60 ML/MIN/1.73 M^2
EST. GFR  (NON AFRICAN AMERICAN): >60 ML/MIN/1.73 M^2
GLUCOSE SERPL-MCNC: 171 MG/DL
HCT VFR BLD AUTO: 43.5 %
HGB BLD-MCNC: 14 G/DL
IMM GRANULOCYTES # BLD AUTO: ABNORMAL K/UL
IMM GRANULOCYTES NFR BLD AUTO: ABNORMAL %
LDH SERPL L TO P-CCNC: 170 U/L
LYMPHOCYTES NFR BLD: 76 %
MCH RBC QN AUTO: 29.3 PG
MCHC RBC AUTO-ENTMCNC: 32.2 G/DL
MCV RBC AUTO: 91 FL
MONOCYTES NFR BLD: 2 %
NEUTROPHILS NFR BLD: 22 %
NRBC BLD-RTO: 0 /100 WBC
PLATELET # BLD AUTO: 260 K/UL
PLATELET BLD QL SMEAR: ABNORMAL
PMV BLD AUTO: 10 FL
POTASSIUM SERPL-SCNC: 4.2 MMOL/L
PROT SERPL-MCNC: 6.7 G/DL
RBC # BLD AUTO: 4.78 M/UL
SODIUM SERPL-SCNC: 140 MMOL/L
WBC # BLD AUTO: 23.37 K/UL

## 2018-04-24 PROCEDURE — 36415 COLL VENOUS BLD VENIPUNCTURE: CPT

## 2018-04-24 PROCEDURE — 99214 OFFICE O/P EST MOD 30 MIN: CPT | Mod: S$GLB,,, | Performed by: INTERNAL MEDICINE

## 2018-04-24 PROCEDURE — 99999 PR PBB SHADOW E&M-EST. PATIENT-LVL III: CPT | Mod: PBBFAC,,, | Performed by: INTERNAL MEDICINE

## 2018-04-24 PROCEDURE — 85027 COMPLETE CBC AUTOMATED: CPT

## 2018-04-24 PROCEDURE — 85007 BL SMEAR W/DIFF WBC COUNT: CPT

## 2018-04-24 PROCEDURE — 83615 LACTATE (LD) (LDH) ENZYME: CPT

## 2018-04-24 PROCEDURE — 80053 COMPREHEN METABOLIC PANEL: CPT

## 2018-04-24 RX ORDER — AZITHROMYCIN 250 MG/1
TABLET, FILM COATED ORAL
Qty: 6 TABLET | Refills: 0 | Status: SHIPPED | OUTPATIENT
Start: 2018-04-24 | End: 2018-04-29

## 2018-05-25 RX ORDER — OMEPRAZOLE 20 MG/1
20 CAPSULE, DELAYED RELEASE ORAL 2 TIMES DAILY
Qty: 180 CAPSULE | Refills: 0 | Status: SHIPPED | OUTPATIENT
Start: 2018-05-25 | End: 2018-05-30 | Stop reason: SDUPTHER

## 2018-05-30 ENCOUNTER — TELEPHONE (OUTPATIENT)
Dept: INTERNAL MEDICINE | Facility: CLINIC | Age: 78
End: 2018-05-30

## 2018-05-30 RX ORDER — OMEPRAZOLE 20 MG/1
20 CAPSULE, DELAYED RELEASE ORAL 2 TIMES DAILY
Qty: 180 CAPSULE | Refills: 0 | Status: SHIPPED | OUTPATIENT
Start: 2018-05-30 | End: 2018-05-30 | Stop reason: SDUPTHER

## 2018-05-30 RX ORDER — OMEPRAZOLE 20 MG/1
20 CAPSULE, DELAYED RELEASE ORAL 2 TIMES DAILY
Qty: 180 CAPSULE | Refills: 0 | Status: SHIPPED | OUTPATIENT
Start: 2018-05-30 | End: 2018-08-25 | Stop reason: SDUPTHER

## 2018-05-30 NOTE — TELEPHONE ENCOUNTER
----- Message from Kiesha Saldivar sent at 5/30/2018 12:06 PM CDT -----  Contact: self  Type: Rx    Name of medication(s): omeprazole (PRILOSEC) 20 MG capsule    Is this a refill? New rx? refill    Who prescribed medication? Lionel    Pharmacy Name, Phone, & Location: [facility]/pharmacy #[id number] - [place], [place] - [address] AT [facility]     Comments: patient is requesting refill to be send over to her [facility] pharmacy

## 2018-06-05 ENCOUNTER — TELEPHONE (OUTPATIENT)
Dept: INTERNAL MEDICINE | Facility: CLINIC | Age: 78
End: 2018-06-05

## 2018-06-05 RX ORDER — MECLIZINE HYDROCHLORIDE 25 MG/1
TABLET ORAL
Qty: 30 TABLET | Refills: 0 | Status: SHIPPED | OUTPATIENT
Start: 2018-06-05 | End: 2018-10-03 | Stop reason: SDUPTHER

## 2018-07-23 ENCOUNTER — LAB VISIT (OUTPATIENT)
Dept: LAB | Facility: HOSPITAL | Age: 78
End: 2018-07-23
Attending: INTERNAL MEDICINE
Payer: MEDICARE

## 2018-07-23 ENCOUNTER — OFFICE VISIT (OUTPATIENT)
Dept: HEMATOLOGY/ONCOLOGY | Facility: CLINIC | Age: 78
End: 2018-07-23
Payer: MEDICARE

## 2018-07-23 VITALS
OXYGEN SATURATION: 97 % | DIASTOLIC BLOOD PRESSURE: 60 MMHG | SYSTOLIC BLOOD PRESSURE: 132 MMHG | BODY MASS INDEX: 27.52 KG/M2 | RESPIRATION RATE: 20 BRPM | WEIGHT: 161.19 LBS | TEMPERATURE: 98 F | HEIGHT: 64 IN | HEART RATE: 66 BPM

## 2018-07-23 DIAGNOSIS — C91.10 CHRONIC LYMPHOCYTIC LEUKEMIA: Primary | ICD-10-CM

## 2018-07-23 DIAGNOSIS — C91.10 CHRONIC LYMPHOCYTIC LEUKEMIA: ICD-10-CM

## 2018-07-23 LAB
ALBUMIN SERPL BCP-MCNC: 3.8 G/DL
ALP SERPL-CCNC: 63 U/L
ALT SERPL W/O P-5'-P-CCNC: 28 U/L
ANION GAP SERPL CALC-SCNC: 8 MMOL/L
AST SERPL-CCNC: 26 U/L
BASOPHILS # BLD AUTO: ABNORMAL K/UL
BASOPHILS NFR BLD: 0 %
BILIRUB SERPL-MCNC: 0.5 MG/DL
BUN SERPL-MCNC: 14 MG/DL
CALCIUM SERPL-MCNC: 9.5 MG/DL
CHLORIDE SERPL-SCNC: 101 MMOL/L
CO2 SERPL-SCNC: 30 MMOL/L
CREAT SERPL-MCNC: 0.8 MG/DL
DIFFERENTIAL METHOD: ABNORMAL
EOSINOPHIL # BLD AUTO: ABNORMAL K/UL
EOSINOPHIL NFR BLD: 1 %
ERYTHROCYTE [DISTWIDTH] IN BLOOD BY AUTOMATED COUNT: 13.4 %
EST. GFR  (AFRICAN AMERICAN): >60 ML/MIN/1.73 M^2
EST. GFR  (NON AFRICAN AMERICAN): >60 ML/MIN/1.73 M^2
GLUCOSE SERPL-MCNC: 137 MG/DL
HCT VFR BLD AUTO: 42.4 %
HGB BLD-MCNC: 14.2 G/DL
HYPOCHROMIA BLD QL SMEAR: ABNORMAL
IMM GRANULOCYTES # BLD AUTO: ABNORMAL K/UL
IMM GRANULOCYTES NFR BLD AUTO: ABNORMAL %
LDH SERPL L TO P-CCNC: 181 U/L
LYMPHOCYTES # BLD AUTO: ABNORMAL K/UL
LYMPHOCYTES NFR BLD: 89 %
MCH RBC QN AUTO: 30 PG
MCHC RBC AUTO-ENTMCNC: 33.5 G/DL
MCV RBC AUTO: 90 FL
MONOCYTES # BLD AUTO: ABNORMAL K/UL
MONOCYTES NFR BLD: 1 %
NEUTROPHILS NFR BLD: 9 %
NRBC BLD-RTO: 0 /100 WBC
PATH REV BLD -IMP: NORMAL
PLATELET # BLD AUTO: 236 K/UL
PLATELET BLD QL SMEAR: ABNORMAL
PMV BLD AUTO: 10.3 FL
POTASSIUM SERPL-SCNC: 4.2 MMOL/L
PROT SERPL-MCNC: 6.7 G/DL
RBC # BLD AUTO: 4.73 M/UL
SODIUM SERPL-SCNC: 139 MMOL/L
WBC # BLD AUTO: 26.35 K/UL

## 2018-07-23 PROCEDURE — 85027 COMPLETE CBC AUTOMATED: CPT

## 2018-07-23 PROCEDURE — 36415 COLL VENOUS BLD VENIPUNCTURE: CPT

## 2018-07-23 PROCEDURE — 99214 OFFICE O/P EST MOD 30 MIN: CPT | Mod: S$GLB,,, | Performed by: INTERNAL MEDICINE

## 2018-07-23 PROCEDURE — 85007 BL SMEAR W/DIFF WBC COUNT: CPT

## 2018-07-23 PROCEDURE — 83615 LACTATE (LD) (LDH) ENZYME: CPT

## 2018-07-23 PROCEDURE — 80053 COMPREHEN METABOLIC PANEL: CPT

## 2018-07-23 PROCEDURE — 99999 PR PBB SHADOW E&M-EST. PATIENT-LVL IV: CPT | Mod: PBBFAC,,, | Performed by: INTERNAL MEDICINE

## 2018-07-23 PROCEDURE — 85060 BLOOD SMEAR INTERPRETATION: CPT | Mod: ,,, | Performed by: PATHOLOGY

## 2018-07-23 NOTE — PROGRESS NOTES
Subjective:       Patient ID: Jorge Montgomery is a 77 y.o. female.    Chief Complaint: No chief complaint on file.    HPI     Returns for follow-up of CLL.  Reports her mom is now is in a nursing home and has accepted it better     No fevers, chills or recurrent infections. No night sweats.  No weight loss.  No lymphadenopathy.  No bleeding or bruising.  Interstitial cystitis issues- flared with stress and diet.    Review of Systems   Constitutional: Negative for activity change, appetite change, chills, diaphoresis, fatigue, fever and unexpected weight change.   HENT: Negative for congestion, ear pain, mouth sores, nosebleeds, postnasal drip, rhinorrhea, sinus pressure, sneezing, sore throat, tinnitus and voice change.    Eyes: Negative for redness and visual disturbance.   Respiratory: Negative for cough, chest tightness, shortness of breath and wheezing.    Cardiovascular: Negative for chest pain, palpitations and leg swelling.   Gastrointestinal: Negative for abdominal distention, abdominal pain, blood in stool, constipation, diarrhea, nausea and vomiting.   Genitourinary: Negative for decreased urine volume, difficulty urinating, dysuria, frequency, hematuria, urgency and vaginal bleeding.   Musculoskeletal: Negative for arthralgias, back pain, joint swelling, myalgias, neck pain and neck stiffness.   Skin: Negative for color change, pallor, rash and wound.   Allergic/Immunologic: Negative for environmental allergies.   Neurological: Negative for dizziness, weakness, light-headedness, numbness and headaches.   Hematological: Negative for adenopathy. Does not bruise/bleed easily.   Psychiatric/Behavioral: Negative for confusion, decreased concentration and dysphoric mood.       Objective:      Physical Exam   Constitutional: She is oriented to person, place, and time. She appears well-developed and well-nourished. No distress.   Presents alone   HENT:   Head: Normocephalic and atraumatic.   Right Ear:  External ear normal.   Left Ear: External ear normal.   Nose: Nose normal.   Mouth/Throat: Oropharynx is clear and moist. No oropharyngeal exudate.   Eyes: Conjunctivae and EOM are normal. Pupils are equal, round, and reactive to light. Right eye exhibits no discharge. Left eye exhibits no discharge. No scleral icterus.   Neck: Normal range of motion. Neck supple. No JVD present. No tracheal deviation present. No thyromegaly present.   Cardiovascular: Normal rate, regular rhythm, normal heart sounds and intact distal pulses.  Exam reveals no gallop and no friction rub.    No murmur heard.  Pulmonary/Chest: Effort normal and breath sounds normal. No stridor. No respiratory distress. She has no wheezes. She has no rales. She exhibits no tenderness.   Abdominal: Soft. Bowel sounds are normal. She exhibits no distension and no mass. There is no tenderness. There is no rebound and no guarding.   No hepatosplenomegaly   Musculoskeletal: Normal range of motion. She exhibits no edema or tenderness.   Lymphadenopathy:        Head (right side): No submandibular, no preauricular, no posterior auricular and no occipital adenopathy present.        Head (left side): No submandibular, no preauricular, no posterior auricular and no occipital adenopathy present.     She has no cervical adenopathy.        Right cervical: No superficial cervical, no deep cervical and no posterior cervical adenopathy present.       Left cervical: No superficial cervical, no deep cervical and no posterior cervical adenopathy present.     She has no axillary adenopathy.        Right: No inguinal and no supraclavicular adenopathy present.        Left: No inguinal and no supraclavicular adenopathy present.   Neurological: She is alert and oriented to person, place, and time. No cranial nerve deficit. She exhibits normal muscle tone. Coordination normal.   Skin: Skin is warm and dry. No rash noted. She is not diaphoretic. No erythema. No pallor.    Psychiatric: She has a normal mood and affect. Her behavior is normal. Judgment and thought content normal.   Nursing note and vitals reviewed.    Labs- reviewed  Assessment:       1. Chronic lymphocytic leukemia        Plan:     1. Parameters stable  No indications to treat  Patient aware of indications warranting treatment and knows to call for any issues  RTC 3 -4 months with labs          Distress Screening Results: Psychosocial Distress screening score of Distress Score: 0 noted and reviewed. No intervention indicated.

## 2018-08-26 RX ORDER — OMEPRAZOLE 20 MG/1
20 CAPSULE, DELAYED RELEASE ORAL 2 TIMES DAILY
Qty: 180 CAPSULE | Refills: 4 | Status: SHIPPED | OUTPATIENT
Start: 2018-08-26 | End: 2021-03-19

## 2018-09-26 ENCOUNTER — IMMUNIZATION (OUTPATIENT)
Dept: INTERNAL MEDICINE | Facility: CLINIC | Age: 78
End: 2018-09-26
Payer: MEDICARE

## 2018-09-26 PROCEDURE — 90662 IIV NO PRSV INCREASED AG IM: CPT | Mod: PBBFAC

## 2018-10-03 ENCOUNTER — TELEPHONE (OUTPATIENT)
Dept: INTERNAL MEDICINE | Facility: CLINIC | Age: 78
End: 2018-10-03

## 2018-10-03 RX ORDER — MECLIZINE HYDROCHLORIDE 25 MG/1
TABLET ORAL
Qty: 30 TABLET | Refills: 0 | Status: SHIPPED | OUTPATIENT
Start: 2018-10-03 | End: 2018-10-04 | Stop reason: SDUPTHER

## 2018-10-03 NOTE — TELEPHONE ENCOUNTER
Pt states that she has been dizzy since 9-15, she has been taking meclizine, however she has now ran out. Pt states that she has been trying to make an apt since yesterday however Chintansemiliano's phone were down all day. She would like this taken care of ASAP.

## 2018-10-03 NOTE — TELEPHONE ENCOUNTER
----- Message from Xiao Parker sent at 10/3/2018  9:25 AM CDT -----  Contact: Jorge Montgomery [phone] or [phone] (After 11)  Jorge states she was supposed to speak with a nurse in regards to her condition before scheduling an appointment with Dr. Flowers.

## 2018-10-04 RX ORDER — MECLIZINE HYDROCHLORIDE 25 MG/1
TABLET ORAL
Qty: 30 TABLET | Refills: 0 | Status: SHIPPED | OUTPATIENT
Start: 2018-10-04 | End: 2018-10-05 | Stop reason: SDUPTHER

## 2018-10-05 ENCOUNTER — OFFICE VISIT (OUTPATIENT)
Dept: INTERNAL MEDICINE | Facility: CLINIC | Age: 78
End: 2018-10-05
Payer: MEDICARE

## 2018-10-05 VITALS
SYSTOLIC BLOOD PRESSURE: 128 MMHG | BODY MASS INDEX: 27.77 KG/M2 | DIASTOLIC BLOOD PRESSURE: 70 MMHG | HEART RATE: 75 BPM | WEIGHT: 162.63 LBS | OXYGEN SATURATION: 98 % | HEIGHT: 64 IN

## 2018-10-05 DIAGNOSIS — E78.5 HYPERLIPIDEMIA, UNSPECIFIED HYPERLIPIDEMIA TYPE: ICD-10-CM

## 2018-10-05 DIAGNOSIS — F32.0 MAJOR DEPRESSIVE DISORDER, SINGLE EPISODE, MILD: ICD-10-CM

## 2018-10-05 DIAGNOSIS — J30.1 ALLERGIC RHINITIS DUE TO POLLEN, UNSPECIFIED SEASONALITY: ICD-10-CM

## 2018-10-05 DIAGNOSIS — C91.10 CHRONIC LYMPHOCYTIC LEUKEMIA: ICD-10-CM

## 2018-10-05 DIAGNOSIS — R42 DIZZINESS: Primary | ICD-10-CM

## 2018-10-05 DIAGNOSIS — J30.1 ALLERGIC RHINITIS DUE TO POLLEN: ICD-10-CM

## 2018-10-05 PROCEDURE — 99213 OFFICE O/P EST LOW 20 MIN: CPT | Mod: PBBFAC | Performed by: INTERNAL MEDICINE

## 2018-10-05 PROCEDURE — 99214 OFFICE O/P EST MOD 30 MIN: CPT | Mod: S$PBB,,, | Performed by: INTERNAL MEDICINE

## 2018-10-05 PROCEDURE — 99999 PR PBB SHADOW E&M-EST. PATIENT-LVL III: CPT | Mod: PBBFAC,,, | Performed by: INTERNAL MEDICINE

## 2018-10-05 PROCEDURE — 1101F PT FALLS ASSESS-DOCD LE1/YR: CPT | Mod: CPTII,,, | Performed by: INTERNAL MEDICINE

## 2018-10-05 RX ORDER — MECLIZINE HYDROCHLORIDE 25 MG/1
TABLET ORAL
Qty: 30 TABLET | Refills: 0 | Status: SHIPPED | OUTPATIENT
Start: 2018-10-05 | End: 2018-10-05 | Stop reason: SDUPTHER

## 2018-10-05 RX ORDER — MECLIZINE HYDROCHLORIDE 25 MG/1
TABLET ORAL
Qty: 30 TABLET | Refills: 6 | Status: SHIPPED | OUTPATIENT
Start: 2018-10-05 | End: 2018-10-05 | Stop reason: SDUPTHER

## 2018-10-05 RX ORDER — FLUTICASONE PROPIONATE 50 MCG
1 SPRAY, SUSPENSION (ML) NASAL DAILY PRN
Qty: 9.9 G | Refills: 6 | Status: SHIPPED | OUTPATIENT
Start: 2018-10-05 | End: 2018-10-05 | Stop reason: SDUPTHER

## 2018-10-05 RX ORDER — MECLIZINE HYDROCHLORIDE 25 MG/1
TABLET ORAL
Qty: 30 TABLET | Refills: 6 | Status: SHIPPED | OUTPATIENT
Start: 2018-10-05 | End: 2021-03-19

## 2018-10-05 RX ORDER — FLUTICASONE PROPIONATE 50 MCG
1 SPRAY, SUSPENSION (ML) NASAL DAILY PRN
Qty: 9.9 G | Refills: 6 | Status: SHIPPED | OUTPATIENT
Start: 2018-10-05 | End: 2021-06-07

## 2018-10-05 NOTE — PROGRESS NOTES
CHIEF COMPLAINT: Follow up of CLL, stress, reflux, allergies, dizziness    HISTORY OF PRESENT ILLNESS: This is a 77-year-old woman who presents for follow up of above.     Dizziness has been worse. On 9/25/18, she woke up and tried to pick her head off the pillow and was dizzy.  She took a meclizine and the symptoms resolved. She was taking meclizine 25 mg twice daily which was controlling her symptoms until she ran out.  Dizziness is better but not completely gone.  Room spinning occurs when she bends over and changes positions of her head.      No abdominal pain.  Her interstitial cystitis is asx.  She is no longer drinking alcohol and soda beverages which she thinks triggered her flare last fall.  No dysuria or hematuria     Her WBC for her CLL has been stable. No fever, chills, night sweatts, weight loss. She saw Dr Merchant 7/23/18    Her stress has been worse.  She is helping manage her 90 year old mother with dementia.  Her  continues to give her stress. She feels depressed.  NO homicidal or suicidal ideations.  She is taking Celexa 40 mg 1 tablet daliy. She denies any overwhelming insomnia, anxiety or depression. Reflux is controlled on omeprazole 20 mg 2 tablets daily.       She takes loratadine 10 mg daily for allergies.     She has not had a lot of reflux and takes omeprazole as needed.    She is taking celexa 40 mg daily for mood. She could not tolerate buroprion as it gave her side effects.  has been critical to her. He is very negative.  She has been more tired which she feels is related to mood. Mother is in a nursing home and is enjoying the nursing home.  Her mother has a friend in the nursing home.     No fever, chills, visual issues, hearing issues, chest pain, shortness of breath, nausea, vomiting, constipation, diarrhea, dysuria, hematuria, joint pain, muscle pain, rashes, headaches, insomnia.        PAST MEDICAL HISTORY:   1. Reflux.   2. Hyperlipidemia.   3. Interstitial cystitis -  "controlled with diet.   4. Chronic lymphocytic leukemia diagnosed . No need for treatment at this point in time.   5. Situational stress with anxiety and depression.   6. History of back pain in  - resolved.   7. History of liver biopsy  which revealed a hemangioma.     PAST SURGICAL HISTORY:   1. .   2. Hysterectomy.   3. Cholecystectomy.   4. Ganglion cyst removed from the left foot.     SOCIAL HISTORY: She is a past smoker, quit smoking 20 years ago; she   smoked a pack per week on and off for 5-10 years. No alcohol.    with two children.     MEDICATIONS and ALLERGIES: Updated on EPIC     PHYSICAL EXAMINATION:     /70 (BP Location: Left arm, Patient Position: Sitting, BP Method: Medium (Manual))   Pulse 75   Ht 5' 4" (1.626 m)   Wt 73.8 kg (162 lb 9.6 oz)   LMP  (LMP Unknown)   SpO2 98%   BMI 27.91 kg/m²     General: Alert, oriented. No apparent distress. Affect within normal limits.   Conjunctivae anicteric. Tympanic membranes clear fluid. Oropharynx erythematus with exudate   Neck supple. No cervical lymphadenopathy  Respiratory effort normal. Lungs clear to auscultation.   Heart: Regular rate and rhythm without murmurs, gallops or rubs.   No lower extremity edema.         MMG  reviewed  BMD 3/8/17- normal.      Labs 18 reviewed      ASSESSMENT AND PLAN:   1. Dizziness - due to fluid in middle ear- flonase nasal spray - use routinely for now. Meclizine refilled.   2. Interstitial cystitis - asx  2. Reflux - controlled   3. Situational stress/major depression - on celexa to 40 mg 1 tablet daily  4. CLL -stable  5. History of thyroid nodules - thyroid ultrasound stable   6. Hyperlipidemia -continue to work on diet, exercise and weight loss and flax seed oil. Does not want lipid lowering medications    7.  I'll see her back in 6 months, sooner if problems arise.        Influenza , Prevnar 10/15, Pneumvax 10/14  MMG   BMD 3/17 - normal and no " change  Colonoscopy 10/2017 -diverticulosis - due   - Brother  of colon cancer at age 62, will do in

## 2018-10-22 RX ORDER — MECLIZINE HYDROCHLORIDE 25 MG/1
TABLET ORAL
Qty: 30 TABLET | Refills: 0 | Status: SHIPPED | OUTPATIENT
Start: 2018-10-22 | End: 2019-07-31 | Stop reason: SDUPTHER

## 2018-10-26 ENCOUNTER — TELEPHONE (OUTPATIENT)
Dept: HEMATOLOGY/ONCOLOGY | Facility: CLINIC | Age: 78
End: 2018-10-26

## 2018-10-26 NOTE — TELEPHONE ENCOUNTER
Call to pt.   Informed pt that Dr. Merchant will be booked out 11/7 r/t meeting in [place] all day.   Assisted pt with r/s- pt only wants to see Dr. Merchant.   Appt r/s.   Pt verbalized understanding.

## 2018-11-08 ENCOUNTER — OFFICE VISIT (OUTPATIENT)
Dept: HEMATOLOGY/ONCOLOGY | Facility: CLINIC | Age: 78
End: 2018-11-08
Payer: MEDICARE

## 2018-11-08 ENCOUNTER — LAB VISIT (OUTPATIENT)
Dept: LAB | Facility: HOSPITAL | Age: 78
End: 2018-11-08
Attending: INTERNAL MEDICINE
Payer: MEDICARE

## 2018-11-08 VITALS
BODY MASS INDEX: 27.59 KG/M2 | DIASTOLIC BLOOD PRESSURE: 73 MMHG | SYSTOLIC BLOOD PRESSURE: 131 MMHG | RESPIRATION RATE: 20 BRPM | TEMPERATURE: 98 F | WEIGHT: 160.69 LBS | HEART RATE: 60 BPM

## 2018-11-08 DIAGNOSIS — C91.10 CHRONIC LYMPHOCYTIC LEUKEMIA: Primary | ICD-10-CM

## 2018-11-08 DIAGNOSIS — C91.10 CHRONIC LYMPHOCYTIC LEUKEMIA: ICD-10-CM

## 2018-11-08 LAB
ALBUMIN SERPL BCP-MCNC: 3.9 G/DL
ALP SERPL-CCNC: 69 U/L
ALT SERPL W/O P-5'-P-CCNC: 33 U/L
ANION GAP SERPL CALC-SCNC: 7 MMOL/L
ANISOCYTOSIS BLD QL SMEAR: SLIGHT
AST SERPL-CCNC: 34 U/L
BASOPHILS NFR BLD: 0 %
BILIRUB SERPL-MCNC: 0.5 MG/DL
BUN SERPL-MCNC: 16 MG/DL
CALCIUM SERPL-MCNC: 9.7 MG/DL
CHLORIDE SERPL-SCNC: 103 MMOL/L
CO2 SERPL-SCNC: 31 MMOL/L
CREAT SERPL-MCNC: 0.8 MG/DL
DIFFERENTIAL METHOD: ABNORMAL
EOSINOPHIL NFR BLD: 0 %
ERYTHROCYTE [DISTWIDTH] IN BLOOD BY AUTOMATED COUNT: 13.4 %
EST. GFR  (AFRICAN AMERICAN): >60 ML/MIN/1.73 M^2
EST. GFR  (NON AFRICAN AMERICAN): >60 ML/MIN/1.73 M^2
GLUCOSE SERPL-MCNC: 99 MG/DL
HCT VFR BLD AUTO: 43.8 %
HGB BLD-MCNC: 14.6 G/DL
IMM GRANULOCYTES # BLD AUTO: ABNORMAL K/UL
IMM GRANULOCYTES NFR BLD AUTO: ABNORMAL %
LDH SERPL L TO P-CCNC: 189 U/L
LYMPHOCYTES NFR BLD: 86 %
MCH RBC QN AUTO: 29.4 PG
MCHC RBC AUTO-ENTMCNC: 33.3 G/DL
MCV RBC AUTO: 88 FL
MONOCYTES NFR BLD: 0 %
NEUTROPHILS NFR BLD: 14 %
NRBC BLD-RTO: 0 /100 WBC
PATH REV BLD -IMP: NORMAL
PLATELET # BLD AUTO: 247 K/UL
PLATELET BLD QL SMEAR: ABNORMAL
PMV BLD AUTO: 9.8 FL
POTASSIUM SERPL-SCNC: 4.9 MMOL/L
PROT SERPL-MCNC: 6.9 G/DL
RBC # BLD AUTO: 4.96 M/UL
SODIUM SERPL-SCNC: 141 MMOL/L
WBC # BLD AUTO: 28.98 K/UL

## 2018-11-08 PROCEDURE — 83615 LACTATE (LD) (LDH) ENZYME: CPT

## 2018-11-08 PROCEDURE — 99499 UNLISTED E&M SERVICE: CPT | Mod: S$GLB,,, | Performed by: INTERNAL MEDICINE

## 2018-11-08 PROCEDURE — 1101F PT FALLS ASSESS-DOCD LE1/YR: CPT | Mod: CPTII,S$GLB,, | Performed by: INTERNAL MEDICINE

## 2018-11-08 PROCEDURE — 85060 BLOOD SMEAR INTERPRETATION: CPT | Mod: ,,, | Performed by: PATHOLOGY

## 2018-11-08 PROCEDURE — 80053 COMPREHEN METABOLIC PANEL: CPT

## 2018-11-08 PROCEDURE — 99999 PR PBB SHADOW E&M-EST. PATIENT-LVL III: CPT | Mod: PBBFAC,,, | Performed by: INTERNAL MEDICINE

## 2018-11-08 PROCEDURE — 85027 COMPLETE CBC AUTOMATED: CPT

## 2018-11-08 PROCEDURE — 99213 OFFICE O/P EST LOW 20 MIN: CPT | Mod: S$GLB,,, | Performed by: INTERNAL MEDICINE

## 2018-11-08 PROCEDURE — 85007 BL SMEAR W/DIFF WBC COUNT: CPT

## 2018-11-08 PROCEDURE — 36415 COLL VENOUS BLD VENIPUNCTURE: CPT

## 2018-12-21 ENCOUNTER — NURSE TRIAGE (OUTPATIENT)
Dept: ADMINISTRATIVE | Facility: CLINIC | Age: 78
End: 2018-12-21

## 2018-12-21 RX ORDER — AMOXICILLIN 875 MG/1
875 TABLET, FILM COATED ORAL 2 TIMES DAILY
Qty: 14 TABLET | Refills: 0 | Status: SHIPPED | OUTPATIENT
Start: 2018-12-21 | End: 2018-12-28

## 2018-12-21 NOTE — TELEPHONE ENCOUNTER
Spoke with pt  She has had a cough for the last 7 days - green phlegm from her throat  No fever or chills, sore thorat, ear pain, shortness of breath, nauseas, vomiting, wheezing, nasal congestion.   Zpack and mucinex twice,   Drink plenty fluid

## 2018-12-21 NOTE — TELEPHONE ENCOUNTER
Reason for Disposition   Colds with no complications    Protocols used: ST COMMON COLD-A-[place]    Patient called for advice for a productive cough with green mucous and chest congestion for more than 7 days. She denies fever. She has a history of CLL but it is remission right now. Patient informed that her pcp is 100% booked for today. Patient will go to her pharmacy to consult with the pharmacist about over the counter remedies first. Patient understands that [facility] has 7 day/week access to urgent care.

## 2019-02-08 ENCOUNTER — LAB VISIT (OUTPATIENT)
Dept: LAB | Facility: HOSPITAL | Age: 79
End: 2019-02-08
Attending: INTERNAL MEDICINE
Payer: MEDICARE

## 2019-02-08 ENCOUNTER — OFFICE VISIT (OUTPATIENT)
Dept: HEMATOLOGY/ONCOLOGY | Facility: CLINIC | Age: 79
End: 2019-02-08
Payer: MEDICARE

## 2019-02-08 VITALS
BODY MASS INDEX: 27.48 KG/M2 | WEIGHT: 160.94 LBS | RESPIRATION RATE: 20 BRPM | SYSTOLIC BLOOD PRESSURE: 136 MMHG | DIASTOLIC BLOOD PRESSURE: 63 MMHG | OXYGEN SATURATION: 96 % | TEMPERATURE: 98 F | HEIGHT: 64 IN | HEART RATE: 58 BPM

## 2019-02-08 DIAGNOSIS — C91.10 CHRONIC LYMPHOCYTIC LEUKEMIA: Primary | ICD-10-CM

## 2019-02-08 DIAGNOSIS — C91.10 CHRONIC LYMPHOCYTIC LEUKEMIA: ICD-10-CM

## 2019-02-08 LAB
ALBUMIN SERPL BCP-MCNC: 3.9 G/DL
ALP SERPL-CCNC: 67 U/L
ALT SERPL W/O P-5'-P-CCNC: 29 U/L
ANION GAP SERPL CALC-SCNC: 6 MMOL/L
AST SERPL-CCNC: 30 U/L
BASOPHILS # BLD AUTO: ABNORMAL K/UL
BASOPHILS NFR BLD: 0 %
BILIRUB SERPL-MCNC: 0.4 MG/DL
BUN SERPL-MCNC: 17 MG/DL
CALCIUM SERPL-MCNC: 10.1 MG/DL
CHLORIDE SERPL-SCNC: 100 MMOL/L
CO2 SERPL-SCNC: 33 MMOL/L
CREAT SERPL-MCNC: 0.8 MG/DL
DIFFERENTIAL METHOD: ABNORMAL
EOSINOPHIL # BLD AUTO: ABNORMAL K/UL
EOSINOPHIL NFR BLD: 0 %
ERYTHROCYTE [DISTWIDTH] IN BLOOD BY AUTOMATED COUNT: 13.3 %
EST. GFR  (AFRICAN AMERICAN): >60 ML/MIN/1.73 M^2
EST. GFR  (NON AFRICAN AMERICAN): >60 ML/MIN/1.73 M^2
GIANT PLATELETS BLD QL SMEAR: PRESENT
GLUCOSE SERPL-MCNC: 127 MG/DL
HCT VFR BLD AUTO: 43.5 %
HGB BLD-MCNC: 14.3 G/DL
IMM GRANULOCYTES # BLD AUTO: ABNORMAL K/UL
IMM GRANULOCYTES NFR BLD AUTO: ABNORMAL %
LDH SERPL L TO P-CCNC: 174 U/L
LYMPHOCYTES # BLD AUTO: ABNORMAL K/UL
LYMPHOCYTES NFR BLD: 83.5 %
MCH RBC QN AUTO: 29.4 PG
MCHC RBC AUTO-ENTMCNC: 32.9 G/DL
MCV RBC AUTO: 90 FL
MONOCYTES # BLD AUTO: ABNORMAL K/UL
MONOCYTES NFR BLD: 0 %
NEUTROPHILS NFR BLD: 16.5 %
NRBC BLD-RTO: 0 /100 WBC
OVALOCYTES BLD QL SMEAR: ABNORMAL
PLATELET # BLD AUTO: 245 K/UL
PLATELET BLD QL SMEAR: ABNORMAL
PMV BLD AUTO: 10 FL
POIKILOCYTOSIS BLD QL SMEAR: SLIGHT
POTASSIUM SERPL-SCNC: 4.2 MMOL/L
PROT SERPL-MCNC: 7 G/DL
RBC # BLD AUTO: 4.86 M/UL
SODIUM SERPL-SCNC: 139 MMOL/L
URATE SERPL-MCNC: 4.6 MG/DL
WBC # BLD AUTO: 25.57 K/UL

## 2019-02-08 PROCEDURE — 85007 BL SMEAR W/DIFF WBC COUNT: CPT

## 2019-02-08 PROCEDURE — 99999 PR PBB SHADOW E&M-EST. PATIENT-LVL III: ICD-10-PCS | Mod: PBBFAC,,, | Performed by: INTERNAL MEDICINE

## 2019-02-08 PROCEDURE — 36415 COLL VENOUS BLD VENIPUNCTURE: CPT

## 2019-02-08 PROCEDURE — 99499 UNLISTED E&M SERVICE: CPT | Mod: S$GLB,,, | Performed by: INTERNAL MEDICINE

## 2019-02-08 PROCEDURE — 85027 COMPLETE CBC AUTOMATED: CPT

## 2019-02-08 PROCEDURE — 1101F PR PT FALLS ASSESS DOC 0-1 FALLS W/OUT INJ PAST YR: ICD-10-PCS | Mod: CPTII,S$GLB,, | Performed by: INTERNAL MEDICINE

## 2019-02-08 PROCEDURE — 83615 LACTATE (LD) (LDH) ENZYME: CPT

## 2019-02-08 PROCEDURE — 1101F PT FALLS ASSESS-DOCD LE1/YR: CPT | Mod: CPTII,S$GLB,, | Performed by: INTERNAL MEDICINE

## 2019-02-08 PROCEDURE — 99499 RISK ADDL DX/OHS AUDIT: ICD-10-PCS | Mod: S$GLB,,, | Performed by: INTERNAL MEDICINE

## 2019-02-08 PROCEDURE — 80053 COMPREHEN METABOLIC PANEL: CPT

## 2019-02-08 PROCEDURE — 84550 ASSAY OF BLOOD/URIC ACID: CPT

## 2019-02-08 PROCEDURE — 99213 PR OFFICE/OUTPT VISIT, EST, LEVL III, 20-29 MIN: ICD-10-PCS | Mod: S$GLB,,, | Performed by: INTERNAL MEDICINE

## 2019-02-08 PROCEDURE — 99213 OFFICE O/P EST LOW 20 MIN: CPT | Mod: S$GLB,,, | Performed by: INTERNAL MEDICINE

## 2019-02-08 PROCEDURE — 99999 PR PBB SHADOW E&M-EST. PATIENT-LVL III: CPT | Mod: PBBFAC,,, | Performed by: INTERNAL MEDICINE

## 2019-02-08 NOTE — PROGRESS NOTES
Subjective:       Patient ID: Jorge Montgomery is a 78 y.o. female.    Chief Complaint: No chief complaint on file.    HPI     Returns for follow-up of CLL.  Overall doing well.   States no new issues.  Denies fatigue.  No fevers, chills or recurrent infections. No night sweats.  No weight loss.  No lymphadenopathy.  No bleeding or bruising.    Review of Systems   Constitutional: Negative for activity change, appetite change, chills, diaphoresis, fatigue, fever and unexpected weight change.   HENT: Negative for congestion, ear pain, mouth sores, nosebleeds, postnasal drip, rhinorrhea, sinus pressure, sneezing, sore throat, tinnitus and voice change.    Eyes: Negative for redness and visual disturbance.   Respiratory: Negative for cough, chest tightness, shortness of breath and wheezing.    Cardiovascular: Negative for chest pain, palpitations and leg swelling.   Gastrointestinal: Negative for abdominal distention, abdominal pain, blood in stool, constipation, diarrhea, nausea and vomiting.   Genitourinary: Negative for decreased urine volume, difficulty urinating, dysuria, frequency, hematuria, urgency and vaginal bleeding.   Musculoskeletal: Negative for arthralgias, back pain, joint swelling, myalgias, neck pain and neck stiffness.   Skin: Negative for color change, pallor, rash and wound.   Allergic/Immunologic: Negative for environmental allergies.   Neurological: Negative for dizziness, weakness, light-headedness, numbness and headaches.   Hematological: Negative for adenopathy. Does not bruise/bleed easily.   Psychiatric/Behavioral: Negative for confusion, decreased concentration and dysphoric mood.       Objective:      Physical Exam   Constitutional: She is oriented to person, place, and time. She appears well-developed and well-nourished. No distress.   Presents alone   HENT:   Head: Normocephalic and atraumatic.   Right Ear: External ear normal.   Left Ear: External ear normal.   Nose: Nose normal.    Mouth/Throat: Oropharynx is clear and moist. No oropharyngeal exudate.   Eyes: Conjunctivae and EOM are normal. Pupils are equal, round, and reactive to light. Right eye exhibits no discharge. Left eye exhibits no discharge. No scleral icterus.   Neck: Normal range of motion. Neck supple. No JVD present. No tracheal deviation present. No thyromegaly present.   Cardiovascular: Normal rate, regular rhythm, normal heart sounds and intact distal pulses. Exam reveals no gallop and no friction rub.   No murmur heard.  Pulmonary/Chest: Effort normal and breath sounds normal. No stridor. No respiratory distress. She has no wheezes. She has no rales. She exhibits no tenderness.   Abdominal: Soft. Bowel sounds are normal. She exhibits no distension and no mass. There is no tenderness. There is no rebound and no guarding.   No hepatosplenomegaly   Musculoskeletal: Normal range of motion. She exhibits no edema or tenderness.   Lymphadenopathy:        Head (right side): No submandibular, no preauricular, no posterior auricular and no occipital adenopathy present.        Head (left side): No submandibular, no preauricular, no posterior auricular and no occipital adenopathy present.     She has no cervical adenopathy.        Right cervical: No superficial cervical, no deep cervical and no posterior cervical adenopathy present.       Left cervical: No superficial cervical, no deep cervical and no posterior cervical adenopathy present.     She has no axillary adenopathy.        Right: No inguinal and no supraclavicular adenopathy present.        Left: No inguinal and no supraclavicular adenopathy present.   Neurological: She is alert and oriented to person, place, and time. No cranial nerve deficit. She exhibits normal muscle tone. Coordination normal.   Skin: Skin is warm and dry. No rash noted. She is not diaphoretic. No erythema. No pallor.   Psychiatric: She has a normal mood and affect. Her behavior is normal. Judgment and  thought content normal.   Nursing note and vitals reviewed.    Labs- reviewed  Assessment:       1. Chronic lymphocytic leukemia        Plan:     1. Does not meet lab parameters or clinical indications to treat.  Knows indications for which to call.  RTC 6 months with labs.      Distress Screening Results: Psychosocial Distress screening score of Distress Score: 0 noted and reviewed. No intervention indicated.

## 2019-04-03 ENCOUNTER — TELEPHONE (OUTPATIENT)
Dept: INTERNAL MEDICINE | Facility: CLINIC | Age: 79
End: 2019-04-03

## 2019-04-03 DIAGNOSIS — Z12.31 SCREENING MAMMOGRAM, ENCOUNTER FOR: Primary | ICD-10-CM

## 2019-04-03 NOTE — TELEPHONE ENCOUNTER
----- Message from Danielle John sent at 4/3/2019  1:05 PM CDT -----  Contact: [phone]  Caller is requesting to schedule their annual screening mammogram appointment. Order is not listed in Epic.  Please enter order and contact patient to schedule.  Where would they like the mammogram performed?:    Would the patient rather receive a phone call back or a response via [facility]?:   Call back  Additional information:  Please advise, thanks

## 2019-04-12 ENCOUNTER — HOSPITAL ENCOUNTER (OUTPATIENT)
Dept: RADIOLOGY | Facility: HOSPITAL | Age: 79
Discharge: HOME | End: 2019-04-12
Attending: INTERNAL MEDICINE
Payer: MEDICARE

## 2019-04-12 DIAGNOSIS — Z12.31 SCREENING MAMMOGRAM, ENCOUNTER FOR: ICD-10-CM

## 2019-04-12 PROCEDURE — 77067 MAMMO DIGITAL SCREENING BILAT WITH TOMOSYNTHESIS_CAD: ICD-10-PCS | Mod: 26,,, | Performed by: RADIOLOGY

## 2019-04-12 PROCEDURE — 77067 SCR MAMMO BI INCL CAD: CPT | Mod: 26,,, | Performed by: RADIOLOGY

## 2019-04-12 PROCEDURE — 77067 SCR MAMMO BI INCL CAD: CPT | Mod: TC

## 2019-04-12 PROCEDURE — 77063 BREAST TOMOSYNTHESIS BI: CPT | Mod: 26,,, | Performed by: RADIOLOGY

## 2019-04-12 PROCEDURE — 77063 MAMMO DIGITAL SCREENING BILAT WITH TOMOSYNTHESIS_CAD: ICD-10-PCS | Mod: 26,,, | Performed by: RADIOLOGY

## 2019-07-03 ENCOUNTER — TELEPHONE (OUTPATIENT)
Dept: INTERNAL MEDICINE | Facility: CLINIC | Age: 79
End: 2019-07-03

## 2019-07-03 DIAGNOSIS — N39.0 URINARY TRACT INFECTION WITHOUT HEMATURIA, SITE UNSPECIFIED: Primary | ICD-10-CM

## 2019-07-03 RX ORDER — NITROFURANTOIN 25; 75 MG/1; MG/1
100 CAPSULE ORAL 2 TIMES DAILY
Qty: 10 CAPSULE | Refills: 0 | Status: SHIPPED | OUTPATIENT
Start: 2019-07-03 | End: 2019-07-08

## 2019-07-03 NOTE — TELEPHONE ENCOUNTER
Please have pt drop off a urine sample today THEN start on macrobid 100 mg twice daily for 5 days.  What time does the lab close? Let her know  Rx sent to [facility]

## 2019-07-03 NOTE — TELEPHONE ENCOUNTER
----- Message from Michelle Diane MA sent at 7/3/2019  4:09 PM CDT -----  Contact: Patient [phone]  Please advise  ----- Message -----  From: Mimi Matamoros  Sent: 7/3/2019   2:44 PM  To: Lionel [facility] Staff    Patient believes she has a bladder infection for 5 days.  Pain and pressure, no blood,no itching.     Patient would like Rx sent to [facility]pharmacy #[id number] - [place] - [address] AT [facility] [phone] (Phone) [phone] (Fax)  Patient would like a call back.    Please call and advise  Thank you

## 2019-07-05 ENCOUNTER — TELEPHONE (OUTPATIENT)
Dept: INTERNAL MEDICINE | Facility: CLINIC | Age: 79
End: 2019-07-05

## 2019-07-05 ENCOUNTER — LAB VISIT (OUTPATIENT)
Dept: LAB | Facility: HOSPITAL | Age: 79
End: 2019-07-05
Attending: INTERNAL MEDICINE
Payer: MEDICARE

## 2019-07-05 DIAGNOSIS — N39.0 URINARY TRACT INFECTION WITHOUT HEMATURIA, SITE UNSPECIFIED: ICD-10-CM

## 2019-07-05 LAB
BACTERIA #/AREA URNS AUTO: ABNORMAL /HPF
BILIRUB UR QL STRIP: NEGATIVE
CAOX CRY UR QL COMP ASSIST: ABNORMAL
CLARITY UR REFRACT.AUTO: ABNORMAL
COLOR UR AUTO: YELLOW
GLUCOSE UR QL STRIP: NEGATIVE
HGB UR QL STRIP: NEGATIVE
KETONES UR QL STRIP: NEGATIVE
LEUKOCYTE ESTERASE UR QL STRIP: NEGATIVE
MICROSCOPIC COMMENT: ABNORMAL
NITRITE UR QL STRIP: NEGATIVE
PH UR STRIP: 5 [PH] (ref 5–8)
PROT UR QL STRIP: NEGATIVE
RBC #/AREA URNS AUTO: 1 /HPF (ref 0–4)
SP GR UR STRIP: 1.02 (ref 1–1.03)
SQUAMOUS #/AREA URNS AUTO: 3 /HPF
URN SPEC COLLECT METH UR: ABNORMAL
WBC #/AREA URNS AUTO: 1 /HPF (ref 0–5)

## 2019-07-05 PROCEDURE — 87086 URINE CULTURE/COLONY COUNT: CPT

## 2019-07-05 PROCEDURE — 81001 URINALYSIS AUTO W/SCOPE: CPT

## 2019-07-05 NOTE — TELEPHONE ENCOUNTER
Spoke with pt  She had intercourse with her  for the last several weeks, worse the last week.  had a penile implant and is having ntercourse daily - could be 2-3 hours when occurs.   Urinalysis is fine. No signs of infection on urine.  Waiting for culture.     Could have a vaginal infection. Stop intercourse for 7 days.  Start on macrobid 100 mg daily 5 days. When resume intercourse, use over the counter lubricant.

## 2019-07-05 NOTE — TELEPHONE ENCOUNTER
----- Message from Danielle John sent at 7/5/2019  2:30 PM CDT -----  Contact: [phone]  Patient would like to get test results  Name of test (lab, mammo, etc.):   Urine specimen   Date of test:  7/5   Would the patient rather a call back or a response via [facility]?: Call back  Comments: Please advise, thanks

## 2019-07-06 LAB
BACTERIA UR CULT: NORMAL
BACTERIA UR CULT: NORMAL

## 2019-07-22 RX ORDER — CITALOPRAM 40 MG/1
TABLET, FILM COATED ORAL
Qty: 90 TABLET | Refills: 0 | Status: SHIPPED | OUTPATIENT
Start: 2019-07-22 | End: 2019-10-18 | Stop reason: SDUPTHER

## 2019-07-24 ENCOUNTER — TELEPHONE (OUTPATIENT)
Dept: INTERNAL MEDICINE | Facility: CLINIC | Age: 79
End: 2019-07-24

## 2019-07-24 DIAGNOSIS — R10.2 PELVIC PAIN: Primary | ICD-10-CM

## 2019-07-24 NOTE — TELEPHONE ENCOUNTER
Pt having vaginal pain x beginning of month. Pt states that she has bruising in her vaginal area from sexual intercourse. Pt states that her  had a penial implant and has been wanting her to have intercourse for 2-3 hours. Pt has pain when she sits down. She is unsure if this is from sexual intercourse or not. Please advise.

## 2019-07-24 NOTE — TELEPHONE ENCOUNTER
----- Message from Susan Palacios sent at 7/24/2019  5:07 PM CDT -----  Contact: [phone]      ----- Message -----  From: Danielle John  Sent: 7/24/2019   4:05 PM  To: [facility] Staff    Patient would like to get medical advice.  Symptoms (please be specific):  Pain in vagina area   How long has patient had these symptoms: 3 days     Pharmacy name and phone # (copy/paste from chart):  [id number] - [place], [place] - [address] AT [facility]   [phone] (Phone)  [phone] (Fax)  Would the patient rather a call back or a response via [facility]?:  Call back  Comments:  Please advise, thanks

## 2019-07-25 NOTE — TELEPHONE ENCOUNTER
Have her see gyn for further evaluation. She is to have pelvic rest - no intercourse for another 2 weeks.

## 2019-07-31 ENCOUNTER — OFFICE VISIT (OUTPATIENT)
Dept: OBSTETRICS AND GYNECOLOGY | Facility: CLINIC | Age: 79
End: 2019-07-31
Payer: MEDICARE

## 2019-07-31 VITALS
WEIGHT: 159.63 LBS | DIASTOLIC BLOOD PRESSURE: 63 MMHG | HEIGHT: 64 IN | BODY MASS INDEX: 27.25 KG/M2 | SYSTOLIC BLOOD PRESSURE: 117 MMHG

## 2019-07-31 DIAGNOSIS — Z01.411 ENCOUNTER FOR GYNECOLOGICAL EXAMINATION WITH ABNORMAL FINDING: Primary | ICD-10-CM

## 2019-07-31 DIAGNOSIS — N94.10 FEMALE DYSPAREUNIA: ICD-10-CM

## 2019-07-31 DIAGNOSIS — N95.2 POSTMENOPAUSAL ATROPHIC VAGINITIS: ICD-10-CM

## 2019-07-31 DIAGNOSIS — N30.90 CYSTITIS WITHOUT HEMATURIA: ICD-10-CM

## 2019-07-31 LAB
BACTERIA #/AREA URNS AUTO: NORMAL /HPF
BILIRUB UR QL STRIP: NEGATIVE
CLARITY UR REFRACT.AUTO: CLEAR
COLOR UR AUTO: YELLOW
GLUCOSE UR QL STRIP: NEGATIVE
HGB UR QL STRIP: ABNORMAL
KETONES UR QL STRIP: NEGATIVE
LEUKOCYTE ESTERASE UR QL STRIP: NEGATIVE
MICROSCOPIC COMMENT: NORMAL
NITRITE UR QL STRIP: NEGATIVE
PH UR STRIP: 5 [PH] (ref 5–8)
PROT UR QL STRIP: NEGATIVE
RBC #/AREA URNS AUTO: 1 /HPF (ref 0–4)
SP GR UR STRIP: 1.02 (ref 1–1.03)
SQUAMOUS #/AREA URNS AUTO: 0 /HPF
URN SPEC COLLECT METH UR: ABNORMAL
WBC #/AREA URNS AUTO: 1 /HPF (ref 0–5)

## 2019-07-31 PROCEDURE — 99203 OFFICE O/P NEW LOW 30 MIN: CPT | Mod: S$GLB,,, | Performed by: OBSTETRICS & GYNECOLOGY

## 2019-07-31 PROCEDURE — 99203 PR OFFICE/OUTPT VISIT, NEW, LEVL III, 30-44 MIN: ICD-10-PCS | Mod: S$GLB,,, | Performed by: OBSTETRICS & GYNECOLOGY

## 2019-07-31 PROCEDURE — 1101F PR PT FALLS ASSESS DOC 0-1 FALLS W/OUT INJ PAST YR: ICD-10-PCS | Mod: CPTII,S$GLB,, | Performed by: OBSTETRICS & GYNECOLOGY

## 2019-07-31 PROCEDURE — 1101F PT FALLS ASSESS-DOCD LE1/YR: CPT | Mod: CPTII,S$GLB,, | Performed by: OBSTETRICS & GYNECOLOGY

## 2019-07-31 PROCEDURE — 99999 PR PBB SHADOW E&M-EST. PATIENT-LVL III: CPT | Mod: PBBFAC,,, | Performed by: OBSTETRICS & GYNECOLOGY

## 2019-07-31 PROCEDURE — 87086 URINE CULTURE/COLONY COUNT: CPT

## 2019-07-31 PROCEDURE — 81001 URINALYSIS AUTO W/SCOPE: CPT

## 2019-07-31 PROCEDURE — 99999 PR PBB SHADOW E&M-EST. PATIENT-LVL III: ICD-10-PCS | Mod: PBBFAC,,, | Performed by: OBSTETRICS & GYNECOLOGY

## 2019-07-31 RX ORDER — ESTRADIOL 0.1 MG/G
0.5 CREAM VAGINAL DAILY
Qty: 42 G | Refills: 4 | Status: SHIPPED | OUTPATIENT
Start: 2019-07-31 | End: 2021-03-19

## 2019-07-31 NOTE — LETTER
July 31, 2019      Amirah Flowers MD  [address]  [place] [address]           [facility] 5th [facility]  [address] [facility] [facility] [address]  [facility] [address]  Phone: [phone]          Patient: Jorge Montgomery   MR Number: [id number]   YOB: 1940   Date of Visit: 7/31/2019       Dear Dr. Amirah Flowers:    Thank you for referring Jorge Montgomery to me for evaluation. Attached you will find relevant portions of my assessment and plan of care.    If you have questions, please do not hesitate to call me. I look forward to following Jorge Montgomery along with you.    Sincerely,    Connie Alcantar MD    Enclosure  CC:  No Recipients    If you would like to receive this communication electronically, please contact [email or web address] or [phone] to request more information on [facility] access.    For providers and/or their staff who would like to refer a patient to [facility], please contact us through our one-stop-shop provider referral line, [facility], at [phone].    If you feel you have received this communication in error or would no longer like to receive these types of communications, please e-mail [email or web address]

## 2019-07-31 NOTE — PROGRESS NOTES
Subjective:       Patient ID: Jorge Montgomery is a 78 y.o. female.    Chief Complaint:  Vaginal Pain and Dyspareunia      History of Present Illness  HPI  Jorge Montgomery is a 78 y.o. female  here for her GYN exam due to vaginal pain and dyspareunia. SHe and her spouse had not been intimate in some time, but he recently had a penile implant and she has had pain with attempted intercourse.    denies vaginal itching or irritation.  Denies vaginal discharge.  She is sexually active. She has had 1 partner for 55 years .   History of abnormal pap: No  Last Pap: patient does not recall when last pap was  Last MMG: normal--routine follow-up in 12 months  Last Colonoscopy:  NA  denies domestic violence. She does feel safe at home.     Past Medical History:   Diagnosis Date    Adjustment disorder 2012    Depression    ALLERGIC RHINITIS 2012    Allergy     AR (allergic rhinitis) 2012    Arthritis     Cataract     Chronic lymphocytic leukemia     CLL (chronic lymphocytic leukemia)     CMC arthritis, thumb, degenerative 1/15/2015    Colon polyp     Depression     Diverticulosis     Dry eye syndrome     Esophageal reflux     Hearing loss, sensorineural 2015    Hyperlipidemia 2012    Interstitial cystitis 2012    Keloid cicatrix     Major depressive disorder, single episode, mild 10/20/2015    PVD (posterior vitreous detachment)     both eyes    Tinnitus, subjective 2015    Vertigo 10/25/2013     Past Surgical History:   Procedure Laterality Date     SECTION, CLASSIC      X1    CHOLECYSTECTOMY      COLONOSCOPY N/A 10/3/2017    [facility] ([facility])    COLONOSCOPY N/A 2013    Performed by Grabiel Pineda MD at [facility] (4TH FLR)    EXCISION-CYST left face Left 3/10/2016    [facility]    ganglion cyst removed left foot      HYSTERECTOMY      (AUB), ovaries remain    LIVER  BIOPSY      Hemangioma    TONSILLECTOMY       Social History     Socioeconomic History    Marital status:      Spouse name: Not on file    Number of children: Not on file    Years of education: Not on file    Highest education level: Not on file   Occupational History    Not on file   Social Needs    Financial resource strain: Not on file    Food insecurity:     Worry: Not on file     Inability: Not on file    Transportation needs:     Medical: Not on file     Non-medical: Not on file   Tobacco Use    Smoking status: Former Smoker     Packs/day: 0.25     Years: 2.00     Pack years: 0.50    Smokeless tobacco: Never Used    Tobacco comment: quit over 20 years ago - smoked pack per week on and off for 10 years   Substance and Sexual Activity    Alcohol use: No     Comment: rare    Drug use: No    Sexual activity: Yes     Partners: Male     Birth control/protection: Surgical     Comment: , together x 55 years   Lifestyle    Physical activity:     Days per week: Not on file     Minutes per session: Not on file    Stress: Not on file   Relationships    Social connections:     Talks on phone: Not on file     Gets together: Not on file     Attends Latter day service: Not on file     Active member of club or organization: Not on file     Attends meetings of clubs or organizations: Not on file     Relationship status: Not on file   Other Topics Concern    Are you pregnant or think you may be? No    Breast-feeding No   Social History Narrative    Not on file     Family History   Problem Relation Age of Onset    Heart disease Mother     Hypertension Mother     Macular degeneration Mother     Cancer Mother         CLL    Dementia Mother     Heart disease Father     Colon cancer Brother     Diabetes Brother     No Known Problems Daughter     COPD Son     No Known Problems Brother     Melanoma Neg Hx     Psoriasis Neg Hx     Lupus Neg Hx     Eczema Neg Hx     Acne Neg Hx      "Breast cancer Neg Hx     Ovarian cancer Neg Hx      OB History        3    Para   3    Term   3            AB        Living   3       SAB        TAB        Ectopic        Multiple        Live Births   3                 /63   Ht 5' 4" (1.626 m)   Wt 72.4 kg (159 lb 9.8 oz)   LMP  (LMP Unknown)   BMI 27.40 kg/m²         GYN & OB History    Date of Last Pap: No result found    OB History    Para Term  AB Living   3 3 3     3   SAB TAB Ectopic Multiple Live Births           3      # Outcome Date GA Lbr Pancho/2nd Weight Sex Delivery Anes PTL Lv   3 Term      Vag-Spont  N ELLI   2 Term      CS-LTranv  N ELLI   1 Term      Vag-Spont  N ELLI       Review of Systems  Review of Systems   Constitutional: Negative for activity change, appetite change, fatigue and unexpected weight change.   HENT: Negative.    Eyes: Negative for visual disturbance.   Respiratory: Negative for shortness of breath and wheezing.    Cardiovascular: Negative for chest pain, palpitations and leg swelling.   Gastrointestinal: Negative for abdominal pain, bloating and blood in stool.   Endocrine: Negative for diabetes, hair loss and hot flashes.   Genitourinary: Positive for dyspareunia, frequency, urgency, vaginal pain and vaginal dryness. Negative for decreased libido.   Musculoskeletal: Negative for back pain and joint swelling.   Integumentary:  Negative for acne, hair changes and nipple discharge.   Neurological: Negative for headaches.   Hematological: Does not bruise/bleed easily.   Psychiatric/Behavioral: Negative for depression and sleep disturbance. The patient is not nervous/anxious.    Breast: Negative for mastodynia and nipple discharge          Objective:      Physical Exam:               Genitourinary: Pelvic exam was performed with patient supine.   Genitourinary Comments: PELVIC: Normal external female genitalia without lesions. Normal hair distribution. Adequate perineal body, normal urethral meatus. " Vagina atrophic without lesions or discharge. No significant cystocele or rectocele. Bimanual exam shows uterus and cervix to be surgically absent. Adnexa without masses or tenderness.  RECTAL: Deferred                              Assessment:        1. Encounter for gynecological examination with abnormal finding    2. Postmenopausal atrophic vaginitis    3. Female dyspareunia    4. Cystitis without hematuria                 Plan:      1. Encounter for gynecological examination with abnormal finding  COUNSELING:  The patient was counseled today on regular weight bearing exercise. Patient was counseled today on the new ACS guidelines for cervical cytology screening as well as the current recommendations for breast cancer screening. Counseling session lasted approximately 10 minutes, and all her questions were answered. She was advised to see her primary care physician for all other health maintenance.   FOLLOW-UP with me for next routine visit.         2. Postmenopausal atrophic vaginitis      - estradiol (ESTRACE) 0.01 % (0.1 mg/gram) vaginal cream; Place 0.5 g vaginally once daily. Insert daily x 2 weeks then twice weekly  Dispense: 42 g; Refill: 4    3. Female dyspareunia      - estradiol (ESTRACE) 0.01 % (0.1 mg/gram) vaginal cream; Place 0.5 g vaginally once daily. Insert daily x 2 weeks then twice weekly  Dispense: 42 g; Refill: 4  - Urine culture  - Urinalysis    4. Cystitis without hematuria       - Urine culture       Follow up in about 2 years (around 7/31/2021), or if symptoms worsen or fail to improve.

## 2019-08-01 LAB
BACTERIA UR CULT: NORMAL
BACTERIA UR CULT: NORMAL

## 2019-08-06 ENCOUNTER — TELEPHONE (OUTPATIENT)
Dept: OBSTETRICS AND GYNECOLOGY | Facility: CLINIC | Age: 79
End: 2019-08-06

## 2019-08-30 ENCOUNTER — OFFICE VISIT (OUTPATIENT)
Dept: HEMATOLOGY/ONCOLOGY | Facility: CLINIC | Age: 79
End: 2019-08-30
Payer: MEDICARE

## 2019-08-30 ENCOUNTER — LAB VISIT (OUTPATIENT)
Dept: LAB | Facility: HOSPITAL | Age: 79
End: 2019-08-30
Attending: INTERNAL MEDICINE
Payer: MEDICARE

## 2019-08-30 VITALS
HEIGHT: 64 IN | TEMPERATURE: 98 F | RESPIRATION RATE: 16 BRPM | WEIGHT: 161.38 LBS | HEART RATE: 74 BPM | SYSTOLIC BLOOD PRESSURE: 136 MMHG | OXYGEN SATURATION: 95 % | BODY MASS INDEX: 27.55 KG/M2 | DIASTOLIC BLOOD PRESSURE: 60 MMHG

## 2019-08-30 DIAGNOSIS — C91.10 CHRONIC LYMPHOCYTIC LEUKEMIA: Primary | ICD-10-CM

## 2019-08-30 DIAGNOSIS — C91.10 CHRONIC LYMPHOCYTIC LEUKEMIA: ICD-10-CM

## 2019-08-30 LAB
ALBUMIN SERPL BCP-MCNC: 4.1 G/DL (ref 3.5–5.2)
ALP SERPL-CCNC: 63 U/L (ref 55–135)
ALT SERPL W/O P-5'-P-CCNC: 29 U/L (ref 10–44)
ANION GAP SERPL CALC-SCNC: 8 MMOL/L (ref 8–16)
AST SERPL-CCNC: 28 U/L (ref 10–40)
BASOPHILS NFR BLD: 0 % (ref 0–1.9)
BILIRUB SERPL-MCNC: 0.3 MG/DL (ref 0.1–1)
BUN SERPL-MCNC: 16 MG/DL (ref 8–23)
CALCIUM SERPL-MCNC: 9.9 MG/DL (ref 8.7–10.5)
CHLORIDE SERPL-SCNC: 100 MMOL/L (ref 95–110)
CO2 SERPL-SCNC: 30 MMOL/L (ref 23–29)
CREAT SERPL-MCNC: 0.8 MG/DL (ref 0.5–1.4)
DIFFERENTIAL METHOD: ABNORMAL
EOSINOPHIL NFR BLD: 0 % (ref 0–8)
ERYTHROCYTE [DISTWIDTH] IN BLOOD BY AUTOMATED COUNT: 13.4 % (ref 11.5–14.5)
EST. GFR  (AFRICAN AMERICAN): >60 ML/MIN/1.73 M^2
EST. GFR  (NON AFRICAN AMERICAN): >60 ML/MIN/1.73 M^2
GLUCOSE SERPL-MCNC: 117 MG/DL (ref 70–110)
HCT VFR BLD AUTO: 42.8 % (ref 37–48.5)
HGB BLD-MCNC: 14 G/DL (ref 12–16)
IMM GRANULOCYTES # BLD AUTO: ABNORMAL K/UL (ref 0–0.04)
IMM GRANULOCYTES NFR BLD AUTO: ABNORMAL % (ref 0–0.5)
LDH SERPL L TO P-CCNC: 177 U/L (ref 110–260)
LYMPHOCYTES NFR BLD: 82 % (ref 18–48)
MCH RBC QN AUTO: 29.4 PG (ref 27–31)
MCHC RBC AUTO-ENTMCNC: 32.7 G/DL (ref 32–36)
MCV RBC AUTO: 90 FL (ref 82–98)
MONOCYTES NFR BLD: 1 % (ref 4–15)
NEUTROPHILS NFR BLD: 17 % (ref 38–73)
NRBC BLD-RTO: 0 /100 WBC
PLATELET # BLD AUTO: 251 K/UL (ref 150–350)
PMV BLD AUTO: 9.9 FL (ref 9.2–12.9)
POTASSIUM SERPL-SCNC: 4.7 MMOL/L (ref 3.5–5.1)
PROT SERPL-MCNC: 7 G/DL (ref 6–8.4)
RBC # BLD AUTO: 4.76 M/UL (ref 4–5.4)
SODIUM SERPL-SCNC: 138 MMOL/L (ref 136–145)
URATE SERPL-MCNC: 4.5 MG/DL (ref 2.4–5.7)
WBC # BLD AUTO: 27.05 K/UL (ref 3.9–12.7)

## 2019-08-30 PROCEDURE — 99214 OFFICE O/P EST MOD 30 MIN: CPT | Mod: S$GLB,,, | Performed by: INTERNAL MEDICINE

## 2019-08-30 PROCEDURE — 84550 ASSAY OF BLOOD/URIC ACID: CPT

## 2019-08-30 PROCEDURE — 99999 PR PBB SHADOW E&M-EST. PATIENT-LVL III: CPT | Mod: PBBFAC,,, | Performed by: INTERNAL MEDICINE

## 2019-08-30 PROCEDURE — 99499 RISK ADDL DX/OHS AUDIT: ICD-10-PCS | Mod: S$GLB,,, | Performed by: INTERNAL MEDICINE

## 2019-08-30 PROCEDURE — 80053 COMPREHEN METABOLIC PANEL: CPT

## 2019-08-30 PROCEDURE — 1101F PT FALLS ASSESS-DOCD LE1/YR: CPT | Mod: CPTII,S$GLB,, | Performed by: INTERNAL MEDICINE

## 2019-08-30 PROCEDURE — 99499 UNLISTED E&M SERVICE: CPT | Mod: S$GLB,,, | Performed by: INTERNAL MEDICINE

## 2019-08-30 PROCEDURE — 1101F PR PT FALLS ASSESS DOC 0-1 FALLS W/OUT INJ PAST YR: ICD-10-PCS | Mod: CPTII,S$GLB,, | Performed by: INTERNAL MEDICINE

## 2019-08-30 PROCEDURE — 99999 PR PBB SHADOW E&M-EST. PATIENT-LVL III: ICD-10-PCS | Mod: PBBFAC,,, | Performed by: INTERNAL MEDICINE

## 2019-08-30 PROCEDURE — 85027 COMPLETE CBC AUTOMATED: CPT

## 2019-08-30 PROCEDURE — 99214 PR OFFICE/OUTPT VISIT, EST, LEVL IV, 30-39 MIN: ICD-10-PCS | Mod: S$GLB,,, | Performed by: INTERNAL MEDICINE

## 2019-08-30 PROCEDURE — 85007 BL SMEAR W/DIFF WBC COUNT: CPT

## 2019-08-30 PROCEDURE — 83615 LACTATE (LD) (LDH) ENZYME: CPT

## 2019-08-30 PROCEDURE — 36415 COLL VENOUS BLD VENIPUNCTURE: CPT

## 2019-08-30 NOTE — PROGRESS NOTES
Subjective:       Patient ID: Jorge Montgomery is a 78 y.o. female.    Chief Complaint: No chief complaint on file.    HPI     Notes increased fatigue  She blames on depression  Noting some memory issues (her mom has dementia at 95)  Is on an antidepressant    Returns for follow-up of CLL.  Overall doing well.   States no new issues.  No fevers, chills or recurrent infections. No night sweats.  No weight loss.  No lymphadenopathy.  No bleeding or bruising.    Review of Systems   Constitutional: Negative for activity change, appetite change, chills, diaphoresis, fatigue, fever and unexpected weight change.   HENT: Negative for congestion, ear pain, mouth sores, nosebleeds, postnasal drip, rhinorrhea, sinus pressure, sneezing, sore throat, tinnitus and voice change.    Eyes: Negative for redness and visual disturbance.   Respiratory: Negative for cough, chest tightness, shortness of breath and wheezing.    Cardiovascular: Negative for chest pain, palpitations and leg swelling.   Gastrointestinal: Negative for abdominal distention, abdominal pain, blood in stool, constipation, diarrhea, nausea and vomiting.   Genitourinary: Negative for decreased urine volume, difficulty urinating, dysuria, frequency, hematuria, urgency and vaginal bleeding.   Musculoskeletal: Negative for arthralgias, back pain, joint swelling, myalgias, neck pain and neck stiffness.   Skin: Negative for color change, pallor, rash and wound.   Allergic/Immunologic: Negative for environmental allergies.   Neurological: Negative for dizziness, weakness, light-headedness, numbness and headaches.   Hematological: Negative for adenopathy. Does not bruise/bleed easily.   Psychiatric/Behavioral: Negative for confusion, decreased concentration and dysphoric mood.       Objective:      Physical Exam   Constitutional: She is oriented to person, place, and time. She appears well-developed and well-nourished. No distress.   Presents alone   HENT:   Head:  Normocephalic and atraumatic.   Right Ear: External ear normal.   Left Ear: External ear normal.   Nose: Nose normal.   Mouth/Throat: Oropharynx is clear and moist. No oropharyngeal exudate.   Eyes: Pupils are equal, round, and reactive to light. Conjunctivae and EOM are normal. Right eye exhibits no discharge. Left eye exhibits no discharge. No scleral icterus.   Neck: Normal range of motion. Neck supple. No JVD present. No tracheal deviation present. No thyromegaly present.   Cardiovascular: Normal rate, regular rhythm, normal heart sounds and intact distal pulses. Exam reveals no gallop and no friction rub.   No murmur heard.  Pulmonary/Chest: Effort normal and breath sounds normal. No stridor. No respiratory distress. She has no wheezes. She has no rales. She exhibits no tenderness.   Abdominal: Soft. Bowel sounds are normal. She exhibits no distension and no mass. There is no tenderness. There is no rebound and no guarding.   No hepatosplenomegaly   Musculoskeletal: Normal range of motion. She exhibits no edema or tenderness.   Lymphadenopathy:        Head (right side): No submandibular, no preauricular, no posterior auricular and no occipital adenopathy present.        Head (left side): No submandibular, no preauricular, no posterior auricular and no occipital adenopathy present.     She has no cervical adenopathy.        Right cervical: No superficial cervical, no deep cervical and no posterior cervical adenopathy present.       Left cervical: No superficial cervical, no deep cervical and no posterior cervical adenopathy present.     She has no axillary adenopathy.        Right: No inguinal and no supraclavicular adenopathy present.        Left: No inguinal and no supraclavicular adenopathy present.   Neurological: She is alert and oriented to person, place, and time. No cranial nerve deficit. She exhibits normal muscle tone. Coordination normal.   Skin: Skin is warm and dry. No rash noted. She is not  diaphoretic. No erythema. No pallor.   Psychiatric: She has a normal mood and affect. Her behavior is normal. Judgment and thought content normal.   Nursing note and vitals reviewed.   Labs- reviewed   Assessment:       1. Chronic lymphocytic leukemia        Plan:     1. Parameters stable  Not requiring treatment  Aware to call for recurrent infections  Aware of indications for treatment    Needs referral to psychology- discussed  25 minutes total

## 2019-08-30 NOTE — Clinical Note
RTC 1-2 weeks to see psychology (Dr. Kauffman)- we discussed that this appointment will be booked under me with Dr. Kauffman's name in comment to protect her privacy as mail as checked)- RTC 3- 4 months with me (cbc, cmp, ldh)

## 2019-09-16 ENCOUNTER — TELEPHONE (OUTPATIENT)
Dept: HEMATOLOGY/ONCOLOGY | Facility: CLINIC | Age: 79
End: 2019-09-16

## 2019-09-16 NOTE — TELEPHONE ENCOUNTER
Returned call, spoke with patient in regards to rescheduling her apt. Patient apologized as she has bee doing a lot lately and just didn't have time today. She stated her apt should be with Dr. Burroughs. I advised her I would send a message to her  Mary White and she would call her back.   She voiced appreciation.

## 2019-09-16 NOTE — TELEPHONE ENCOUNTER
----- Message from Elliott Steen sent at 9/16/2019 10:37 AM CDT -----  Contact: Patient  Reschedule existing appt      Appt date rescheduling:: 09/16    Is appt today or next day appt:: Yes    Type of appt :: md visit    Provider:: Dr Merchant    Reason for rescheduling::    Contact:: [phone]    Addition info::

## 2019-10-18 RX ORDER — CITALOPRAM 40 MG/1
TABLET, FILM COATED ORAL
Qty: 90 TABLET | Refills: 4 | Status: SHIPPED | OUTPATIENT
Start: 2019-10-18 | End: 2019-11-05 | Stop reason: SDUPTHER

## 2019-11-05 RX ORDER — CITALOPRAM 40 MG/1
TABLET, FILM COATED ORAL
Qty: 90 TABLET | Refills: 4 | Status: SHIPPED | OUTPATIENT
Start: 2019-11-05 | End: 2021-03-19

## 2019-11-05 NOTE — TELEPHONE ENCOUNTER
----- Message from Kacy Saldana sent at 11/5/2019 12:40 PM CST -----  Contact: patient [phone]  Patient is calling for an RX refill or new RX.  Is this a refill or new RX:  refill    RX name and strength: citalopram (CELEXA) 40 MG tablet  Directions (copy/paste from chart):  TAKE 1 TABLET BY MOUTH EVERY DAY  Is this a 30 day or 90 day RX:  30  Local pharmacy or mail order pharmacy:  local    Pharmacy name and phone   [facility]/pharmacy #[id number] - [place] - [address] AT [facility] [phone]  Comments:

## 2019-11-16 RX ORDER — CITALOPRAM 40 MG/1
TABLET, FILM COATED ORAL
Qty: 90 TABLET | Refills: 3 | Status: SHIPPED | OUTPATIENT
Start: 2019-11-16 | End: 2021-03-19

## 2019-12-16 ENCOUNTER — PATIENT OUTREACH (OUTPATIENT)
Dept: ADMINISTRATIVE | Facility: OTHER | Age: 79
End: 2019-12-16

## 2019-12-18 ENCOUNTER — OFFICE VISIT (OUTPATIENT)
Dept: OPTOMETRY | Facility: CLINIC | Age: 79
End: 2019-12-18
Payer: MEDICARE

## 2019-12-18 DIAGNOSIS — Z83.518 FAMILY HISTORY OF MACULAR DEGENERATION: ICD-10-CM

## 2019-12-18 DIAGNOSIS — H04.123 BILATERAL DRY EYES: ICD-10-CM

## 2019-12-18 DIAGNOSIS — H25.13 NUCLEAR SCLEROSIS OF BOTH EYES: Primary | ICD-10-CM

## 2019-12-18 DIAGNOSIS — H40.013 OPEN ANGLE WITH BORDERLINE FINDINGS AND LOW GLAUCOMA RISK IN BOTH EYES: ICD-10-CM

## 2019-12-18 DIAGNOSIS — H52.7 REFRACTIVE ERROR: ICD-10-CM

## 2019-12-18 PROCEDURE — 99999 PR PBB SHADOW E&M-EST. PATIENT-LVL II: CPT | Mod: PBBFAC,,, | Performed by: OPTOMETRIST

## 2019-12-18 PROCEDURE — 92015 DETERMINE REFRACTIVE STATE: CPT | Mod: S$GLB,,, | Performed by: OPTOMETRIST

## 2019-12-18 PROCEDURE — 92015 PR REFRACTION: ICD-10-PCS | Mod: S$GLB,,, | Performed by: OPTOMETRIST

## 2019-12-18 PROCEDURE — 99999 PR PBB SHADOW E&M-EST. PATIENT-LVL II: ICD-10-PCS | Mod: PBBFAC,,, | Performed by: OPTOMETRIST

## 2019-12-18 PROCEDURE — 92004 PR EYE EXAM, NEW PATIENT,COMPREHESV: ICD-10-PCS | Mod: S$GLB,,, | Performed by: OPTOMETRIST

## 2019-12-18 PROCEDURE — 92004 COMPRE OPH EXAM NEW PT 1/>: CPT | Mod: S$GLB,,, | Performed by: OPTOMETRIST

## 2019-12-18 NOTE — PROGRESS NOTES
THOMAS CONCEPCION 05/2016 with Dr. Ellis.  Wears reading glasses about 3 yrs. Old and   still seem fine.   Patient had thought her glasses were for reading but   they are actually distance only. Distance vision seems fine without   glasses.  Patient hasn't noticed any vision changes since last exam.    Using AT's once or twice a day.    Last edited by Mariely Padilla on 12/18/2019  7:52 AM. (History)            Assessment /Plan     For exam results, see Encounter Report.    Nuclear sclerosis of both eyes    Bilateral dry eyes    Family history of macular degeneration    Open angle with borderline findings and low glaucoma risk in both eyes    Refractive error      1. Educated pt on presence of cataracts and effects on vision. No surgery at this time. Recheck in one year.  2. Recommend artificial tears. 1 drop 2x per day. Chronicity of disease and treatment discussed.  3. Monitor for condition. Patient to report any changes. RTC 1 year recheck.     4. IOP normal, cd ratio stable, Monitor condition. Patient to report any changes. RTC 1 year recheck.  5. New Spec Rx given. Different lens options discussed with patient. RTC 1 year full exam.

## 2019-12-23 ENCOUNTER — OFFICE VISIT (OUTPATIENT)
Dept: HEMATOLOGY/ONCOLOGY | Facility: CLINIC | Age: 79
End: 2019-12-23
Payer: MEDICARE

## 2019-12-23 ENCOUNTER — LAB VISIT (OUTPATIENT)
Dept: LAB | Facility: HOSPITAL | Age: 79
End: 2019-12-23
Attending: INTERNAL MEDICINE
Payer: MEDICARE

## 2019-12-23 VITALS
TEMPERATURE: 98 F | DIASTOLIC BLOOD PRESSURE: 84 MMHG | HEART RATE: 63 BPM | WEIGHT: 155.63 LBS | BODY MASS INDEX: 26.57 KG/M2 | SYSTOLIC BLOOD PRESSURE: 125 MMHG | HEIGHT: 64 IN | OXYGEN SATURATION: 97 % | RESPIRATION RATE: 16 BRPM

## 2019-12-23 DIAGNOSIS — C91.10 CHRONIC LYMPHOCYTIC LEUKEMIA: ICD-10-CM

## 2019-12-23 DIAGNOSIS — C91.10 CHRONIC LYMPHOCYTIC LEUKEMIA: Primary | ICD-10-CM

## 2019-12-23 LAB
ALBUMIN SERPL BCP-MCNC: 4 G/DL (ref 3.5–5.2)
ALP SERPL-CCNC: 68 U/L (ref 55–135)
ALT SERPL W/O P-5'-P-CCNC: 24 U/L (ref 10–44)
ANION GAP SERPL CALC-SCNC: 7 MMOL/L (ref 8–16)
ANISOCYTOSIS BLD QL SMEAR: SLIGHT
AST SERPL-CCNC: 26 U/L (ref 10–40)
BASOPHILS # BLD AUTO: ABNORMAL K/UL (ref 0–0.2)
BASOPHILS NFR BLD: 0 % (ref 0–1.9)
BILIRUB SERPL-MCNC: 0.5 MG/DL (ref 0.1–1)
BUN SERPL-MCNC: 15 MG/DL (ref 8–23)
CALCIUM SERPL-MCNC: 9.8 MG/DL (ref 8.7–10.5)
CHLORIDE SERPL-SCNC: 102 MMOL/L (ref 95–110)
CO2 SERPL-SCNC: 31 MMOL/L (ref 23–29)
CREAT SERPL-MCNC: 0.8 MG/DL (ref 0.5–1.4)
DIFFERENTIAL METHOD: ABNORMAL
EOSINOPHIL # BLD AUTO: ABNORMAL K/UL (ref 0–0.5)
EOSINOPHIL NFR BLD: 1 % (ref 0–8)
ERYTHROCYTE [DISTWIDTH] IN BLOOD BY AUTOMATED COUNT: 13.4 % (ref 11.5–14.5)
EST. GFR  (AFRICAN AMERICAN): >60 ML/MIN/1.73 M^2
EST. GFR  (NON AFRICAN AMERICAN): >60 ML/MIN/1.73 M^2
GLUCOSE SERPL-MCNC: 174 MG/DL (ref 70–110)
HCT VFR BLD AUTO: 44 % (ref 37–48.5)
HGB BLD-MCNC: 14.1 G/DL (ref 12–16)
HYPOCHROMIA BLD QL SMEAR: ABNORMAL
IMM GRANULOCYTES # BLD AUTO: ABNORMAL K/UL (ref 0–0.04)
IMM GRANULOCYTES NFR BLD AUTO: ABNORMAL % (ref 0–0.5)
LDH SERPL L TO P-CCNC: 183 U/L (ref 110–260)
LYMPHOCYTES # BLD AUTO: ABNORMAL K/UL (ref 1–4.8)
LYMPHOCYTES NFR BLD: 85 % (ref 18–48)
MCH RBC QN AUTO: 29.7 PG (ref 27–31)
MCHC RBC AUTO-ENTMCNC: 32 G/DL (ref 32–36)
MCV RBC AUTO: 93 FL (ref 82–98)
MONOCYTES # BLD AUTO: ABNORMAL K/UL (ref 0.3–1)
MONOCYTES NFR BLD: 0 % (ref 4–15)
NEUTROPHILS NFR BLD: 14 % (ref 38–73)
NRBC BLD-RTO: 0 /100 WBC
PLATELET # BLD AUTO: 241 K/UL (ref 150–350)
PLATELET BLD QL SMEAR: ABNORMAL
PMV BLD AUTO: 10.3 FL (ref 9.2–12.9)
POIKILOCYTOSIS BLD QL SMEAR: SLIGHT
POLYCHROMASIA BLD QL SMEAR: ABNORMAL
POTASSIUM SERPL-SCNC: 3.7 MMOL/L (ref 3.5–5.1)
PROT SERPL-MCNC: 7 G/DL (ref 6–8.4)
RBC # BLD AUTO: 4.74 M/UL (ref 4–5.4)
SODIUM SERPL-SCNC: 140 MMOL/L (ref 136–145)
WBC # BLD AUTO: 29.01 K/UL (ref 3.9–12.7)

## 2019-12-23 PROCEDURE — 1159F MED LIST DOCD IN RCRD: CPT | Mod: S$GLB,,, | Performed by: NURSE PRACTITIONER

## 2019-12-23 PROCEDURE — 1126F PR PAIN SEVERITY QUANTIFIED, NO PAIN PRESENT: ICD-10-PCS | Mod: S$GLB,,, | Performed by: NURSE PRACTITIONER

## 2019-12-23 PROCEDURE — 83615 LACTATE (LD) (LDH) ENZYME: CPT

## 2019-12-23 PROCEDURE — 1126F AMNT PAIN NOTED NONE PRSNT: CPT | Mod: S$GLB,,, | Performed by: NURSE PRACTITIONER

## 2019-12-23 PROCEDURE — 99213 OFFICE O/P EST LOW 20 MIN: CPT | Mod: S$GLB,,, | Performed by: NURSE PRACTITIONER

## 2019-12-23 PROCEDURE — 99999 PR PBB SHADOW E&M-EST. PATIENT-LVL III: CPT | Mod: PBBFAC,,, | Performed by: NURSE PRACTITIONER

## 2019-12-23 PROCEDURE — 1101F PT FALLS ASSESS-DOCD LE1/YR: CPT | Mod: CPTII,S$GLB,, | Performed by: NURSE PRACTITIONER

## 2019-12-23 PROCEDURE — 80053 COMPREHEN METABOLIC PANEL: CPT

## 2019-12-23 PROCEDURE — 36415 COLL VENOUS BLD VENIPUNCTURE: CPT

## 2019-12-23 PROCEDURE — 85027 COMPLETE CBC AUTOMATED: CPT

## 2019-12-23 PROCEDURE — 1101F PR PT FALLS ASSESS DOC 0-1 FALLS W/OUT INJ PAST YR: ICD-10-PCS | Mod: CPTII,S$GLB,, | Performed by: NURSE PRACTITIONER

## 2019-12-23 PROCEDURE — 99213 PR OFFICE/OUTPT VISIT, EST, LEVL III, 20-29 MIN: ICD-10-PCS | Mod: S$GLB,,, | Performed by: NURSE PRACTITIONER

## 2019-12-23 PROCEDURE — 1159F PR MEDICATION LIST DOCUMENTED IN MEDICAL RECORD: ICD-10-PCS | Mod: S$GLB,,, | Performed by: NURSE PRACTITIONER

## 2019-12-23 PROCEDURE — 99499 UNLISTED E&M SERVICE: CPT | Mod: S$GLB,,, | Performed by: NURSE PRACTITIONER

## 2019-12-23 PROCEDURE — 99999 PR PBB SHADOW E&M-EST. PATIENT-LVL III: ICD-10-PCS | Mod: PBBFAC,,, | Performed by: NURSE PRACTITIONER

## 2019-12-23 PROCEDURE — 85007 BL SMEAR W/DIFF WBC COUNT: CPT

## 2019-12-23 PROCEDURE — 99499 RISK ADDL DX/OHS AUDIT: ICD-10-PCS | Mod: S$GLB,,, | Performed by: NURSE PRACTITIONER

## 2019-12-23 NOTE — PROGRESS NOTES
"PATIENT: Jorge Montgomery  MRN: [id number]  DATE: 12/23/2019      Chief complaint:No chief complaint on file.        History of Present Illness: Ms. Montgomery is a 79 y.o. who returns in follow up for CLL.     Today, She feels ok.   Many stressors.    having CABG day after alissa. He is in hospital at this time.   States no new issues.  +fatigue.   No fevers, chills or recurrent infections. No night sweats.  No weight loss.  No lymphadenopathy.  No bleeding or bruising.  No depression at his time.     She is accompanied by her self.      PMFSH: all information reviewed and updated as relevant to today's visit    Review of Systems:   Review of Systems   Constitutional: Negative for activity change, appetite change, fatigue and fever.   HENT: Negative for mouth sores, nosebleeds and sore throat.    Eyes: Negative for visual disturbance.   Respiratory: Negative for cough and shortness of breath.    Cardiovascular: Negative for chest pain, palpitations and leg swelling.   Gastrointestinal: Negative for abdominal pain, constipation, diarrhea, nausea and vomiting.   Genitourinary: Negative for difficulty urinating and frequency.   Musculoskeletal: Negative for arthralgias and back pain.   Skin: Negative for rash.   Neurological: Negative for dizziness, numbness and headaches.   Hematological: Negative for adenopathy. Does not bruise/bleed easily.   Psychiatric/Behavioral: Negative for confusion and sleep disturbance. The patient is not nervous/anxious.    All other systems reviewed and are negative.        Objective:      Vitals:   Vitals:    12/23/19 1459   BP: 125/84   Pulse: 63   Resp: 16   Temp: 98 °F (36.7 °C)   TempSrc: Oral   SpO2: 97%   Weight: 70.6 kg (155 lb 10.3 oz)   Height: 5' 4" (1.626 m)     BMI: Body mass index is 26.72 kg/m².    Physical Exam:   Physical Exam   Constitutional: She is oriented to person, place, and time. She appears well-developed and well-nourished. No distress.   HENT:   Head: " Normocephalic and atraumatic.   Mouth/Throat: Oropharynx is clear and moist.   No sinus tenderness.   Eyes: Pupils are equal, round, and reactive to light. Conjunctivae and lids are normal. No scleral icterus.   Neck: Trachea normal and normal range of motion. Neck supple. No JVD present. No thyromegaly present.   Cardiovascular: Normal rate, regular rhythm, normal heart sounds and intact distal pulses.   Pulmonary/Chest: Effort normal and breath sounds normal. She has no wheezes.   Abdominal: Soft. Normal appearance and bowel sounds are normal. She exhibits no distension and no mass. There is no tenderness.   No organomegaly.    Musculoskeletal: Normal range of motion. She exhibits no edema.   No spinal or paraspinal tenderness.    Lymphadenopathy:        Head (right side): No submental and no submandibular adenopathy present.        Head (left side): No submental and no submandibular adenopathy present.     She has no cervical adenopathy.     She has no axillary adenopathy.        Right: No supraclavicular adenopathy present.        Left: No supraclavicular adenopathy present.   Neurological: She is alert and oriented to person, place, and time. She has normal strength and normal reflexes. No cranial nerve deficit. Gait normal.   Skin: Skin is warm, dry and intact. Capillary refill takes less than 2 seconds. No bruising and no rash noted. No cyanosis. Nails show no clubbing.   Psychiatric: She has a normal mood and affect. Her speech is normal and behavior is normal.   Nursing note and vitals reviewed.      Laboratory Data:  Results for orders placed or performed in visit on 12/23/19   CBC W/ AUTO DIFFERENTIAL   Result Value Ref Range    WBC 29.01 (H) 3.90 - 12.70 K/uL    RBC 4.74 4.00 - 5.40 M/uL    Hemoglobin 14.1 12.0 - 16.0 g/dL    Hematocrit 44.0 37.0 - 48.5 %    Mean Corpuscular Volume 93 82 - 98 fL    Mean Corpuscular Hemoglobin 29.7 27.0 - 31.0 pg    Mean Corpuscular Hemoglobin Conc 32.0 32.0 - 36.0 g/dL     RDW 13.4 11.5 - 14.5 %    Platelets 241 150 - 350 K/uL    MPV 10.3 9.2 - 12.9 fL   Comprehensive metabolic panel   Result Value Ref Range    Sodium 140 136 - 145 mmol/L    Potassium 3.7 3.5 - 5.1 mmol/L    Chloride 102 95 - 110 mmol/L    CO2 31 (H) 23 - 29 mmol/L    Glucose 174 (H) 70 - 110 mg/dL    BUN, Bld 15 8 - 23 mg/dL    Creatinine 0.8 0.5 - 1.4 mg/dL    Calcium 9.8 8.7 - 10.5 mg/dL    Total Protein 7.0 6.0 - 8.4 g/dL    Albumin 4.0 3.5 - 5.2 g/dL    Total Bilirubin 0.5 0.1 - 1.0 mg/dL    Alkaline Phosphatase 68 55 - 135 U/L    AST 26 10 - 40 U/L    ALT 24 10 - 44 U/L    Anion Gap 7 (L) 8 - 16 mmol/L    eGFR if African American >60.0 >60 mL/min/1.73 m^2    eGFR if non African American >60.0 >60 mL/min/1.73 m^2   Lactate dehydrogenase   Result Value Ref Range     110 - 260 U/L        Imaging:  Mammo Digital Screening Bilat w/ Jhony  Result:  Mammo Digital Screening Bilat w/ Jhony    History:  Patient is 78 y.o. and is seen for a screening mammogram.    Films Compared:  Prior images (if available) were compared.    Findings:  Computer-aided detection was utilized in the interpretation of this   examination. This procedure was performed using tomosynthesis.       The breasts are heterogeneously dense, which may obscure small masses.   There is no evidence of suspicious masses, microcalcifications or   architectural distortion.    Impression:  No mammographic evidence of malignancy.    BI-RADS Category 1: Negative    Recommendation:  Routine screening mammogram in 1 year is recommended.    The patient's estimated lifetime risk of breast cancer (to age 85) based   on Tyrer-Cuzick - 7 risk assessment model is: Tyrer-Cuzick: 3.54 %.   According to the American Cancer Society,  patients with a lifetime breast   cancer risk of 20% or higher might benefit from supplemental screening   tests.         Assessment/Plan:     1. Chronic lymphocytic leukemia        Plan:  1. Does not meet lab parameters or clinical  indications to treat.  Knows indications for which to call.  RTC 4 months with labs to see Dr. Mehta.    Med and Orders:  Orders Placed This Encounter    CBC Oncology    Comprehensive metabolic panel    Lactate dehydrogenase       Follow Up:  Follow up in about 4 months (around 4/23/2020).      Patient is in agreement with the proposed treatment plan. All questions were answered to the patient's satisfaction. Pt knows to call clinic for any new or worsening symptoms and if anything is needed before the next clinic visit.      JOSE Pantoja-C  Hematology & Oncology  [address] [place] [id number]  ph. [phone]  Fax. [phone]     I spent 30 minutes (face to face) with the patient, more than 50% was in counseling and coordination of care as detailed above.

## 2020-02-21 ENCOUNTER — TELEPHONE (OUTPATIENT)
Dept: INTERNAL MEDICINE | Facility: CLINIC | Age: 80
End: 2020-02-21

## 2020-02-21 RX ORDER — BUPROPION HYDROCHLORIDE 150 MG/1
150 TABLET ORAL DAILY
Qty: 30 TABLET | Refills: 1 | Status: SHIPPED | OUTPATIENT
Start: 2020-02-21 | End: 2020-03-18 | Stop reason: SDUPTHER

## 2020-02-21 NOTE — TELEPHONE ENCOUNTER
Pt states that citalopram isn't working. She would like something stronger to help her deal with her husbands death and her mother getting sicker. She is headed to the er with her mother with increase sob. Pt would like another Rx for this ASAP. Please advise.

## 2020-02-21 NOTE — TELEPHONE ENCOUNTER
She is to add buproprion  mg once daily to the citalopram 40 mg daily - rx sent to [facility]. Book her to see me in March

## 2020-02-27 ENCOUNTER — TELEPHONE (OUTPATIENT)
Dept: INFUSION THERAPY | Facility: HOSPITAL | Age: 80
End: 2020-02-27

## 2020-02-27 ENCOUNTER — TELEPHONE (OUTPATIENT)
Dept: HEMATOLOGY/ONCOLOGY | Facility: CLINIC | Age: 80
End: 2020-02-27

## 2020-02-27 DIAGNOSIS — F32.89 OTHER DEPRESSION: Primary | ICD-10-CM

## 2020-02-27 NOTE — TELEPHONE ENCOUNTER
Returned call to pt.   Pt reports that her  passed away and she is very emotional (not from his passing however).   Pt reports she would like to speak with a therapist.   Informed pt would place referral for psychologist and scheduling team would contact for an appt.   Expressed condolences to pt.   Pt verbalized understanding and thanked nurse.     Message fwd.          ----- Message from Katie Lujan sent at 2/27/2020 10:03 AM CST -----  Contact: pt#[phone]  She's calling to see if Dr Merchant can recommend a therapist. Please call

## 2020-02-27 NOTE — TELEPHONE ENCOUNTER
Returned call to pt.  Pt unavailable.   Left message for pt to return call.   Callback number provided.       ----- Message from Elliott Steen sent at 2/27/2020  9:41 AM CST -----  Contact: Patient  Patient Advice/Staff Message     Caller name: Pt     Reason for call: Pt states that her  has passed an she's unable to stop crying. Says that his passing isn't the reason, wants to know if there's  Someone she can talk to. Please advise.    Do you feel you need to be seen today:: No        Communication Preference: [phone]    Additional Information:

## 2020-02-28 ENCOUNTER — TELEPHONE (OUTPATIENT)
Dept: INFUSION THERAPY | Facility: HOSPITAL | Age: 80
End: 2020-02-28

## 2020-02-28 NOTE — TELEPHONE ENCOUNTER
Natalie, Ms Montgomery called this morning, a patient access rep tried to transfer her to me, but she had hung up before the call went through. I have called Ms Montgomery back two times today to try & get her scheduled to see Dr. Kauffman. I called her yesterday around 3:08pm (right before I left for the day) & she returned the call at 3:24p, 3:26p & 3:29p. I was gone by that time.She left a message on my voice mail that she has called 3 times trying to schedule an appointment with the therapist. If you speak with her,ask her to call me again. I leave my desk at 3pm. Thanks!

## 2020-03-09 ENCOUNTER — PATIENT OUTREACH (OUTPATIENT)
Dept: ADMINISTRATIVE | Facility: HOSPITAL | Age: 80
End: 2020-03-09

## 2020-03-14 DIAGNOSIS — E78.5 HYPERLIPIDEMIA, UNSPECIFIED HYPERLIPIDEMIA TYPE: Primary | ICD-10-CM

## 2020-03-18 RX ORDER — BUPROPION HYDROCHLORIDE 150 MG/1
150 TABLET ORAL DAILY
Qty: 30 TABLET | Refills: 1 | Status: SHIPPED | OUTPATIENT
Start: 2020-03-18 | End: 2020-04-27

## 2020-03-18 NOTE — TELEPHONE ENCOUNTER
----- Message from Jean Ventura sent at 3/18/2020  1:48 PM CDT -----  Contact: Pharmacy [phone]  RX request - refill or new RX.  Is this a refill or new RX:  Refill  RX name and strength:  buPROPion (WELLBUTRIN XL) 150 MG TB24 tablet   Directions:   Is this a 30 day or 90 day RX:    Pharmacy name and phone # [id number] - [place] - [address] AT [facility] [phone] (Phone)  [phone] (Fax)      Comments:      Please call an advise  Thank you

## 2020-03-19 RX ORDER — BUPROPION HYDROCHLORIDE 150 MG/1
TABLET ORAL
Qty: 90 TABLET | Refills: 0 | Status: SHIPPED | OUTPATIENT
Start: 2020-03-19 | End: 2020-03-25 | Stop reason: SDUPTHER

## 2020-03-19 NOTE — PROGRESS NOTES
Refill Authorization Note     is requesting a refill authorization.    Brief assessment and rationale for refill: REVIEW: do not have security to send labs          Medication Therapy Plan: FOVS(3/20); FLOS(4/20);NTBO/NTBL(lipid), Noticed script sent to [facility] on 3/20 for 30 tabs w/1 refill; Pt. wants script sent to [facility]; Do not have security to send labs, review    Medication reconciliation completed: No   Pharmacist Review Requested: Yes                     Comments:   Refill Center Care Gap Closure protocols temporarily suspended.   Requested Prescriptions   Pending Prescriptions Disp Refills    buPROPion (WELLBUTRIN XL) 150 MG TB24 tablet [Pharmacy Med Name: BUPROPION HCL  MG TABLET] 90 tablet 0     Sig: TAKE 1 TABLET BY MOUTH EVERY DAY       Psychiatry: Antidepressants - bupropion Passed - 3/14/2020 10:33 AM        Passed - Patient is at least 18 years old        Passed - Last BP in normal range within 360 days.     BP Readings from Last 3 Encounters:   12/23/19 125/84   08/30/19 136/60   07/31/19 117/63              Passed - Office visit in past 6 months or future 90 days.     Recent Outpatient Visits            2 months ago Chronic lymphocytic leukemia    [facility] - Hematology Oncology Dina-Celina Felix, B[facility]NY    3 months ago Nuclear sclerosis of both eyes    [facility] - Optometry Devan Alarcon, OD    6 months ago Chronic lymphocytic leukemia    [place] - Hematology Oncology Rema Merchant MD    7 months ago Encounter for gynecological examination with abnormal finding    [facility] - OB/GYN 5th Floor Connie Alcantar MD    1 year ago Chronic lymphocytic leukemia    [place] - Hematology Oncology Rema Merchant MD          Future Appointments              In 4 days MD [facility] - Internal Medicine, [facility]    In 1 month LAB, HEMONC CANCER BLDG [facility]    In 1 month MD [facility] - Hematology Oncology, [facility]                  Appointments  past 12m or future 3m with PCP    Date Provider   Last Visit   10/5/2018 Amirah Flowers MD   Next Visit   3/18/2020 Amirah Flowers MD   .  ED visits in past 90 days: 0       Note composed:1:54 PM 03/19/2020

## 2020-03-25 RX ORDER — BUPROPION HYDROCHLORIDE 150 MG/1
150 TABLET ORAL DAILY
Qty: 90 TABLET | Refills: 4 | Status: SHIPPED | OUTPATIENT
Start: 2020-03-25 | End: 2020-04-27

## 2020-03-31 ENCOUNTER — TELEPHONE (OUTPATIENT)
Dept: INTERNAL MEDICINE | Facility: CLINIC | Age: 80
End: 2020-03-31

## 2020-03-31 ENCOUNTER — OFFICE VISIT (OUTPATIENT)
Dept: INTERNAL MEDICINE | Facility: CLINIC | Age: 80
End: 2020-03-31
Payer: MEDICARE

## 2020-03-31 VITALS
WEIGHT: 147.19 LBS | BODY MASS INDEX: 25.13 KG/M2 | DIASTOLIC BLOOD PRESSURE: 60 MMHG | HEART RATE: 73 BPM | HEIGHT: 64 IN | OXYGEN SATURATION: 97 % | SYSTOLIC BLOOD PRESSURE: 118 MMHG

## 2020-03-31 DIAGNOSIS — E78.5 HYPERLIPIDEMIA, UNSPECIFIED HYPERLIPIDEMIA TYPE: ICD-10-CM

## 2020-03-31 DIAGNOSIS — R79.9 ABNORMAL BLOOD CHEMISTRY: ICD-10-CM

## 2020-03-31 DIAGNOSIS — F43.21 GRIEF REACTION: Primary | ICD-10-CM

## 2020-03-31 DIAGNOSIS — F32.0 MAJOR DEPRESSIVE DISORDER, SINGLE EPISODE, MILD: ICD-10-CM

## 2020-03-31 DIAGNOSIS — C91.10 CHRONIC LYMPHOCYTIC LEUKEMIA: ICD-10-CM

## 2020-03-31 DIAGNOSIS — E53.8 VITAMIN B12 DEFICIENCY: ICD-10-CM

## 2020-03-31 DIAGNOSIS — E55.9 VITAMIN D DEFICIENCY DISEASE: ICD-10-CM

## 2020-03-31 DIAGNOSIS — K21.9 GASTROESOPHAGEAL REFLUX DISEASE WITHOUT ESOPHAGITIS: ICD-10-CM

## 2020-03-31 DIAGNOSIS — Z12.31 SCREENING MAMMOGRAM, ENCOUNTER FOR: ICD-10-CM

## 2020-03-31 DIAGNOSIS — R41.3 MEMORY LOSS: ICD-10-CM

## 2020-03-31 DIAGNOSIS — C91.10 CLL (CHRONIC LYMPHOCYTIC LEUKEMIA): ICD-10-CM

## 2020-03-31 PROCEDURE — 99215 OFFICE O/P EST HI 40 MIN: CPT | Mod: S$GLB,,, | Performed by: INTERNAL MEDICINE

## 2020-03-31 PROCEDURE — 1101F PR PT FALLS ASSESS DOC 0-1 FALLS W/OUT INJ PAST YR: ICD-10-PCS | Mod: CPTII,S$GLB,, | Performed by: INTERNAL MEDICINE

## 2020-03-31 PROCEDURE — 1101F PT FALLS ASSESS-DOCD LE1/YR: CPT | Mod: CPTII,S$GLB,, | Performed by: INTERNAL MEDICINE

## 2020-03-31 PROCEDURE — 99999 PR PBB SHADOW E&M-EST. PATIENT-LVL II: ICD-10-PCS | Mod: PBBFAC,,, | Performed by: INTERNAL MEDICINE

## 2020-03-31 PROCEDURE — 1159F MED LIST DOCD IN RCRD: CPT | Mod: S$GLB,,, | Performed by: INTERNAL MEDICINE

## 2020-03-31 PROCEDURE — 99499 RISK ADDL DX/OHS AUDIT: ICD-10-PCS | Mod: S$GLB,,, | Performed by: INTERNAL MEDICINE

## 2020-03-31 PROCEDURE — 99999 PR PBB SHADOW E&M-EST. PATIENT-LVL II: CPT | Mod: PBBFAC,,, | Performed by: INTERNAL MEDICINE

## 2020-03-31 PROCEDURE — 99499 UNLISTED E&M SERVICE: CPT | Mod: S$GLB,,, | Performed by: INTERNAL MEDICINE

## 2020-03-31 PROCEDURE — 1159F PR MEDICATION LIST DOCUMENTED IN MEDICAL RECORD: ICD-10-PCS | Mod: S$GLB,,, | Performed by: INTERNAL MEDICINE

## 2020-03-31 PROCEDURE — 99215 PR OFFICE/OUTPT VISIT, EST, LEVL V, 40-54 MIN: ICD-10-PCS | Mod: S$GLB,,, | Performed by: INTERNAL MEDICINE

## 2020-03-31 NOTE — TELEPHONE ENCOUNTER
Spoke with pt today.  Pt does not want her health conditions discussed with her granddaughter or her son Patel

## 2020-03-31 NOTE — PROGRESS NOTES
CHIEF COMPLAINT: Follow up of CLL, stress, reflux, allergies, dizziness    HISTORY OF PRESENT ILLNESS: This is a 77-year-old woman who presents for follow up of above.     Her   2020 after 40 days in the ICU from a heart attack. He refused to go to the doctor when he was feeling bad until he had a massive heart attack. She is grieving his death. She is very angry at him for not getting help.  She has good days and bad days. She feels depressed. She is anxious at times. Memory is worse since her 's death.  No homocidal or suicidal ideations.  She started on Wellbutrin  mg daily mid march which she feels is helping. She does want counseling and would like neuropsychological testing for her memory. Her son and grand bharat have been very controlling which she does not like.  She continues to take celexa 40 mg daily. Her mother (in her 90's) is now in [facility] and is doing well.      No dizziness     No abdominal pain.  Her interstitial cystitis is asx with diet  No dysuria or hematuria     Her WBC for her CLL has been stable. No fever, chills, night sweatts, weight loss. She saw Heme Onc 2019    She has not needed omeprazole daily.      She takes loratadine 10 mg daily for allergies.      No fever, chills, visual issues, hearing issues, chest pain, shortness of breath, nausea, vomiting, constipation, diarrhea, dysuria, hematuria, joint pain, muscle pain, rashes, headaches, insomnia.        PAST MEDICAL HISTORY:   1. Reflux.   2. Hyperlipidemia.   3. Interstitial cystitis   4. Chronic lymphocytic leukemia diagnosed . No need for treatment at this point in time.   5. Situational stress with anxiety and depression.   6. History of back pain in 2007 - resolved.   7. History of liver biopsy  which revealed a hemangioma.     PAST SURGICAL HISTORY:   1. .   2. Hysterectomy.   3. Cholecystectomy.   4. Ganglion cyst removed from the left foot.     SOCIAL  "HISTORY: She is a past smoker, quit smoking 20 years ago; she   smoked a pack per week on and off for 5-10 years. No alcohol.    with two children.     MEDICATIONS and ALLERGIES: Updated on EPIC     PHYSICAL EXAMINATION:     /60   Pulse 73   Ht 5' 4" (1.626 m)   Wt 66.7 kg (147 lb 2.5 oz)   LMP  (LMP Unknown)   SpO2 97%   BMI 25.26 kg/m²     General: Alert, oriented. No apparent distress. Affect tearful  Conjunctivae anicteric. Tympanic membranes clear   Neck supple. No cervical lymphadenopathy  Respiratory effort normal. Lungs clear to auscultation.   Heart: Regular rate and rhythm without murmurs, gallops or rubs.   No lower extremity edema.    ABDOMEN: soft, non distended, non tender, bowel sounds present, no hepatosplenomgaly  BREAST: no abn seen, no nodules palpated, no axillary lad        Mini mental status exam 28/30  - missed the season and the month.       ASSESSMENT AND PLAN:   1. Grief/anxiety and depression - refer to primary care behavioral health. Neuropsychological testing. Suspect her memory issues are mood related. Scored 28/30 on mini mental status exam - only missed month and season. Continue Wellbutrin  mg daily and Celexa 40 mg daily. WIll see back in one month to titrate wellbutrin XL to 300 mg daily.   2. Interstitial cystitis - asx  3. Reflux - asx  4. CLL -stable  5. History of thyroid nodules - thyroid ultrasound stable   6. Hyperlipidemia -continue to work on diet, exercise and weight loss and flax seed oil. Does not want lipid lowering medications    7.  I'll see her back in 1 month, sooner if problems arise.        Influenza , Prevnar 10/15, Pneumvax 10/14  MMG   BMD 3/17 - normal and no change  Colonoscopy 10/2017 -diverticulosis - due   - Brother  of colon cancer at age 62    Spent greater than 40 minutes with the patient, greater than 50% in face to face counseling.    "

## 2020-03-31 NOTE — TELEPHONE ENCOUNTER
----- Message from Raven Villagomez sent at 3/31/2020 10:51 AM CDT -----  Contact: Lori/Grand-Daughter [phone]  Patient is scheduled to see you shortly but the grand-daughter would like to speak with you.    Please call and advise.    Thank You

## 2020-04-20 ENCOUNTER — DOCUMENTATION ONLY (OUTPATIENT)
Dept: HEMATOLOGY/ONCOLOGY | Facility: CLINIC | Age: 80
End: 2020-04-20

## 2020-04-20 NOTE — PROGRESS NOTES
SW called pt to assess needs, particularly those related to Covid.  SW left detailed VM including SW name and contact information.

## 2020-04-21 ENCOUNTER — LAB VISIT (OUTPATIENT)
Dept: LAB | Facility: HOSPITAL | Age: 80
End: 2020-04-21
Payer: MEDICARE

## 2020-04-21 ENCOUNTER — OFFICE VISIT (OUTPATIENT)
Dept: HEMATOLOGY/ONCOLOGY | Facility: CLINIC | Age: 80
End: 2020-04-21
Payer: MEDICARE

## 2020-04-21 VITALS
TEMPERATURE: 98 F | RESPIRATION RATE: 20 BRPM | OXYGEN SATURATION: 99 % | HEART RATE: 89 BPM | HEIGHT: 64 IN | BODY MASS INDEX: 24.96 KG/M2 | WEIGHT: 146.19 LBS | DIASTOLIC BLOOD PRESSURE: 61 MMHG | SYSTOLIC BLOOD PRESSURE: 144 MMHG

## 2020-04-21 DIAGNOSIS — R79.9 ABNORMAL BLOOD CHEMISTRY: ICD-10-CM

## 2020-04-21 DIAGNOSIS — C91.10 CHRONIC LYMPHOCYTIC LEUKEMIA: ICD-10-CM

## 2020-04-21 DIAGNOSIS — C91.10 CHRONIC LYMPHOCYTIC LEUKEMIA: Primary | ICD-10-CM

## 2020-04-21 DIAGNOSIS — R41.3 MEMORY LOSS: ICD-10-CM

## 2020-04-21 DIAGNOSIS — E55.9 VITAMIN D DEFICIENCY DISEASE: ICD-10-CM

## 2020-04-21 DIAGNOSIS — E53.8 VITAMIN B12 DEFICIENCY: ICD-10-CM

## 2020-04-21 DIAGNOSIS — E78.5 HYPERLIPIDEMIA, UNSPECIFIED HYPERLIPIDEMIA TYPE: ICD-10-CM

## 2020-04-21 LAB
25(OH)D3+25(OH)D2 SERPL-MCNC: 39 NG/ML (ref 30–96)
ALBUMIN SERPL BCP-MCNC: 3.9 G/DL (ref 3.5–5.2)
ALP SERPL-CCNC: 68 U/L (ref 55–135)
ALT SERPL W/O P-5'-P-CCNC: 20 U/L (ref 10–44)
ANION GAP SERPL CALC-SCNC: 10 MMOL/L (ref 8–16)
AST SERPL-CCNC: 24 U/L (ref 10–40)
BILIRUB SERPL-MCNC: 0.4 MG/DL (ref 0.1–1)
BUN SERPL-MCNC: 16 MG/DL (ref 8–23)
CALCIUM SERPL-MCNC: 9.6 MG/DL (ref 8.7–10.5)
CHLORIDE SERPL-SCNC: 100 MMOL/L (ref 95–110)
CHOLEST SERPL-MCNC: 229 MG/DL (ref 120–199)
CHOLEST/HDLC SERPL: 3.6 {RATIO} (ref 2–5)
CO2 SERPL-SCNC: 30 MMOL/L (ref 23–29)
CREAT SERPL-MCNC: 0.9 MG/DL (ref 0.5–1.4)
ERYTHROCYTE [DISTWIDTH] IN BLOOD BY AUTOMATED COUNT: 13.5 % (ref 11.5–14.5)
EST. GFR  (AFRICAN AMERICAN): >60 ML/MIN/1.73 M^2
EST. GFR  (NON AFRICAN AMERICAN): >60 ML/MIN/1.73 M^2
ESTIMATED AVG GLUCOSE: 123 MG/DL (ref 68–131)
GLUCOSE SERPL-MCNC: 146 MG/DL (ref 70–110)
HBA1C MFR BLD HPLC: 5.9 % (ref 4–5.6)
HCT VFR BLD AUTO: 45.2 % (ref 37–48.5)
HDLC SERPL-MCNC: 63 MG/DL (ref 40–75)
HDLC SERPL: 27.5 % (ref 20–50)
HGB BLD-MCNC: 14.4 G/DL (ref 12–16)
IMM GRANULOCYTES # BLD AUTO: 0.04 K/UL (ref 0–0.04)
LDH SERPL L TO P-CCNC: 192 U/L (ref 110–260)
LDLC SERPL CALC-MCNC: 128.4 MG/DL (ref 63–159)
MCH RBC QN AUTO: 29.8 PG (ref 27–31)
MCHC RBC AUTO-ENTMCNC: 31.9 G/DL (ref 32–36)
MCV RBC AUTO: 93 FL (ref 82–98)
NEUTROPHILS # BLD AUTO: 4 K/UL (ref 1.8–7.7)
NONHDLC SERPL-MCNC: 166 MG/DL
PLATELET # BLD AUTO: 233 K/UL (ref 150–350)
PMV BLD AUTO: 9.8 FL (ref 9.2–12.9)
POTASSIUM SERPL-SCNC: 4.1 MMOL/L (ref 3.5–5.1)
PROT SERPL-MCNC: 7 G/DL (ref 6–8.4)
RBC # BLD AUTO: 4.84 M/UL (ref 4–5.4)
SODIUM SERPL-SCNC: 140 MMOL/L (ref 136–145)
TRIGL SERPL-MCNC: 188 MG/DL (ref 30–150)
TSH SERPL DL<=0.005 MIU/L-ACNC: 1.51 UIU/ML (ref 0.4–4)
VIT B12 SERPL-MCNC: 762 PG/ML (ref 210–950)
WBC # BLD AUTO: 34.05 K/UL (ref 3.9–12.7)

## 2020-04-21 PROCEDURE — 82607 VITAMIN B-12: CPT

## 2020-04-21 PROCEDURE — 99999 PR PBB SHADOW E&M-EST. PATIENT-LVL III: CPT | Mod: PBBFAC,,, | Performed by: INTERNAL MEDICINE

## 2020-04-21 PROCEDURE — 1101F PR PT FALLS ASSESS DOC 0-1 FALLS W/OUT INJ PAST YR: ICD-10-PCS | Mod: CPTII,S$GLB,, | Performed by: INTERNAL MEDICINE

## 2020-04-21 PROCEDURE — 83036 HEMOGLOBIN GLYCOSYLATED A1C: CPT

## 2020-04-21 PROCEDURE — 85027 COMPLETE CBC AUTOMATED: CPT

## 2020-04-21 PROCEDURE — 83615 LACTATE (LD) (LDH) ENZYME: CPT

## 2020-04-21 PROCEDURE — 80053 COMPREHEN METABOLIC PANEL: CPT

## 2020-04-21 PROCEDURE — 1126F AMNT PAIN NOTED NONE PRSNT: CPT | Mod: S$GLB,,, | Performed by: INTERNAL MEDICINE

## 2020-04-21 PROCEDURE — 80061 LIPID PANEL: CPT

## 2020-04-21 PROCEDURE — 99214 OFFICE O/P EST MOD 30 MIN: CPT | Mod: S$GLB,,, | Performed by: INTERNAL MEDICINE

## 2020-04-21 PROCEDURE — 1101F PT FALLS ASSESS-DOCD LE1/YR: CPT | Mod: CPTII,S$GLB,, | Performed by: INTERNAL MEDICINE

## 2020-04-21 PROCEDURE — 1159F MED LIST DOCD IN RCRD: CPT | Mod: S$GLB,,, | Performed by: INTERNAL MEDICINE

## 2020-04-21 PROCEDURE — 99214 PR OFFICE/OUTPT VISIT, EST, LEVL IV, 30-39 MIN: ICD-10-PCS | Mod: S$GLB,,, | Performed by: INTERNAL MEDICINE

## 2020-04-21 PROCEDURE — 84443 ASSAY THYROID STIM HORMONE: CPT

## 2020-04-21 PROCEDURE — 82306 VITAMIN D 25 HYDROXY: CPT

## 2020-04-21 PROCEDURE — 1159F PR MEDICATION LIST DOCUMENTED IN MEDICAL RECORD: ICD-10-PCS | Mod: S$GLB,,, | Performed by: INTERNAL MEDICINE

## 2020-04-21 PROCEDURE — 99999 PR PBB SHADOW E&M-EST. PATIENT-LVL III: ICD-10-PCS | Mod: PBBFAC,,, | Performed by: INTERNAL MEDICINE

## 2020-04-21 PROCEDURE — 1126F PR PAIN SEVERITY QUANTIFIED, NO PAIN PRESENT: ICD-10-PCS | Mod: S$GLB,,, | Performed by: INTERNAL MEDICINE

## 2020-04-21 PROCEDURE — 36415 COLL VENOUS BLD VENIPUNCTURE: CPT

## 2020-04-21 NOTE — PROGRESS NOTES
Subjective:       Patient ID: Jorge Montgomery is a 79 y.o. female.    Chief Complaint: No chief complaint on file.    HPI     Presents for follow up of CLL  She lost her  in the interval - multiple stressors related to family dynamics     Returns for follow-up of CLL.  States no new medical issues.  No fevers, chills or recurrent infections. No night sweats.  No weight loss.  No lymphadenopathy.  No bleeding or bruising.    Review of Systems   Constitutional: Negative for activity change, appetite change, fatigue and fever.   HENT: Negative for mouth sores, nosebleeds and sore throat.    Eyes: Negative for visual disturbance.   Respiratory: Negative for cough and shortness of breath.    Cardiovascular: Negative for chest pain, palpitations and leg swelling.   Gastrointestinal: Negative for abdominal pain, constipation, diarrhea, nausea and vomiting.   Genitourinary: Negative for difficulty urinating and frequency.   Musculoskeletal: Negative for arthralgias and back pain.   Skin: Negative for rash.   Neurological: Negative for dizziness, numbness and headaches.   Hematological: Negative for adenopathy. Does not bruise/bleed easily.   Psychiatric/Behavioral: Negative for confusion, dysphoric mood and sleep disturbance. The patient is not nervous/anxious.        Objective:      Physical Exam   Constitutional: She is oriented to person, place, and time. She appears well-developed and well-nourished. No distress.   Presents alone   HENT:   Head: Normocephalic and atraumatic.   Mouth/Throat: No oropharyngeal exudate.   Eyes: Pupils are equal, round, and reactive to light. Conjunctivae and EOM are normal. No scleral icterus.   Neck: Normal range of motion. Neck supple. No thyromegaly present.   Cardiovascular: Normal rate, regular rhythm and normal heart sounds. Exam reveals no gallop and no friction rub.   No murmur heard.  Pulmonary/Chest: Effort normal and breath sounds normal. No respiratory distress. She  has no wheezes. She has no rales.   Abdominal: Soft. Bowel sounds are normal. She exhibits no distension and no mass. There is no tenderness. There is no guarding.   No hepatosplenomegaly   Musculoskeletal: Normal range of motion. She exhibits no edema, tenderness or deformity.   Lymphadenopathy:        Head (right side): No submandibular, no preauricular, no posterior auricular and no occipital adenopathy present.        Head (left side): No submandibular, no preauricular, no posterior auricular and no occipital adenopathy present.     She has no cervical adenopathy.        Right cervical: No superficial cervical, no deep cervical and no posterior cervical adenopathy present.       Left cervical: No superficial cervical, no deep cervical and no posterior cervical adenopathy present.     She has no axillary adenopathy.        Right: No inguinal and no supraclavicular adenopathy present.        Left: No inguinal and no supraclavicular adenopathy present.   Neurological: She is alert and oriented to person, place, and time.   Skin: Skin is warm and dry. No rash noted. She is not diaphoretic. No erythema. No pallor.   Psychiatric: She has a normal mood and affect. Her behavior is normal. Judgment and thought content normal.   Nursing note and vitals reviewed.   Labs- reviewed   Assessment:       1. Chronic lymphocytic leukemia        Plan:     1. Parameters stable and appropriate  No need for therapy and we will continue surveillance  Knows to call for any issues    Provided support on personal stressors

## 2020-04-27 ENCOUNTER — OFFICE VISIT (OUTPATIENT)
Dept: INTERNAL MEDICINE | Facility: CLINIC | Age: 80
End: 2020-04-27
Payer: MEDICARE

## 2020-04-27 VITALS
HEIGHT: 64 IN | WEIGHT: 145.06 LBS | OXYGEN SATURATION: 96 % | DIASTOLIC BLOOD PRESSURE: 60 MMHG | BODY MASS INDEX: 24.77 KG/M2 | SYSTOLIC BLOOD PRESSURE: 110 MMHG | HEART RATE: 75 BPM

## 2020-04-27 DIAGNOSIS — E78.5 HYPERLIPIDEMIA, UNSPECIFIED HYPERLIPIDEMIA TYPE: ICD-10-CM

## 2020-04-27 DIAGNOSIS — F32.0 MAJOR DEPRESSIVE DISORDER, SINGLE EPISODE, MILD: Primary | ICD-10-CM

## 2020-04-27 DIAGNOSIS — C91.10 CHRONIC LYMPHOCYTIC LEUKEMIA: ICD-10-CM

## 2020-04-27 PROCEDURE — 99499 UNLISTED E&M SERVICE: CPT | Mod: S$GLB,,, | Performed by: INTERNAL MEDICINE

## 2020-04-27 PROCEDURE — 99999 PR PBB SHADOW E&M-EST. PATIENT-LVL IV: ICD-10-PCS | Mod: PBBFAC,,, | Performed by: INTERNAL MEDICINE

## 2020-04-27 PROCEDURE — 1159F PR MEDICATION LIST DOCUMENTED IN MEDICAL RECORD: ICD-10-PCS | Mod: S$GLB,,, | Performed by: INTERNAL MEDICINE

## 2020-04-27 PROCEDURE — 99999 PR PBB SHADOW E&M-EST. PATIENT-LVL IV: CPT | Mod: PBBFAC,,, | Performed by: INTERNAL MEDICINE

## 2020-04-27 PROCEDURE — 1126F AMNT PAIN NOTED NONE PRSNT: CPT | Mod: S$GLB,,, | Performed by: INTERNAL MEDICINE

## 2020-04-27 PROCEDURE — 1101F PR PT FALLS ASSESS DOC 0-1 FALLS W/OUT INJ PAST YR: ICD-10-PCS | Mod: CPTII,S$GLB,, | Performed by: INTERNAL MEDICINE

## 2020-04-27 PROCEDURE — 1101F PT FALLS ASSESS-DOCD LE1/YR: CPT | Mod: CPTII,S$GLB,, | Performed by: INTERNAL MEDICINE

## 2020-04-27 PROCEDURE — 99214 PR OFFICE/OUTPT VISIT, EST, LEVL IV, 30-39 MIN: ICD-10-PCS | Mod: S$GLB,,, | Performed by: INTERNAL MEDICINE

## 2020-04-27 PROCEDURE — 1126F PR PAIN SEVERITY QUANTIFIED, NO PAIN PRESENT: ICD-10-PCS | Mod: S$GLB,,, | Performed by: INTERNAL MEDICINE

## 2020-04-27 PROCEDURE — 99499 RISK ADDL DX/OHS AUDIT: ICD-10-PCS | Mod: S$GLB,,, | Performed by: INTERNAL MEDICINE

## 2020-04-27 PROCEDURE — 1159F MED LIST DOCD IN RCRD: CPT | Mod: S$GLB,,, | Performed by: INTERNAL MEDICINE

## 2020-04-27 PROCEDURE — 99214 OFFICE O/P EST MOD 30 MIN: CPT | Mod: S$GLB,,, | Performed by: INTERNAL MEDICINE

## 2020-04-27 RX ORDER — BUPROPION HYDROCHLORIDE 300 MG/1
300 TABLET ORAL DAILY
Qty: 30 TABLET | Refills: 11 | Status: SHIPPED | OUTPATIENT
Start: 2020-04-27 | End: 2020-05-27 | Stop reason: SDUPTHER

## 2020-04-27 NOTE — PROGRESS NOTES
CHIEF COMPLAINT: Follow up of grief, CLL, stress, reflux, allergies, dizziness    HISTORY OF PRESENT ILLNESS: This is a 79-year-old woman who presents for follow up of above.      Her   2020 after 40 days in the ICU from a heart attack. Her  refused to go to the doctor when he was feeling bad until he had a massive heart attack. She is grieving his death. She is very angry at him for not getting help.  She has good days and bad days.She is now having more good days than bad days. She is having issues with her son. Her son has power of  over her. Her son [place]  She states her son has her 's truck and $23,000 in cash.    She continues to take Wellbutrin  mg daily and celexa 40 mg daily. No homocidal or suicidal ideations. She does want counseling and would like neuropsychological testing for her memory. Her mother (to be 95 in August) is now in [facility] and is doing well.      No abdominal pain.  Her interstitial cystitis is asx with diet  No dysuria or hematuria     Her WBC for her CLL has been stable. No fever, chills, night sweatts, weight loss. She saw Heme Onc 2019     She has not needed omeprazole daily.      She takes loratadine 10 mg daily for allergies.      No fever, chills, visual issues, hearing issues, chest pain, shortness of breath, nausea, vomiting, constipation, diarrhea, dysuria, hematuria, joint pain, muscle pain, rashes, headaches, insomnia.        PAST MEDICAL HISTORY:   1. Reflux.   2. Hyperlipidemia.   3. Interstitial cystitis   4. Chronic lymphocytic leukemia diagnosed . No need for treatment at this point in time.   5. Situational stress with anxiety and depression.   6. History of back pain in 2007 - resolved.   7. History of liver biopsy  which revealed a hemangioma.     PAST SURGICAL HISTORY:   1. .   2. Hysterectomy.   3. Cholecystectomy.   4.  "Ganglion cyst removed from the left foot.     SOCIAL HISTORY: She is a past smoker, quit smoking 20 years ago; she   smoked a pack per week on and off for 5-10 years. No alcohol.    with two children. She has a daughter and a son.    MEDICATIONS and ALLERGIES: Updated on EPIC     PHYSICAL EXAMINATION:     /60 (BP Location: Left arm, Patient Position: Sitting, BP Method: Small (Manual))   Pulse 75   Ht 5' 4" (1.626 m)   Wt 65.8 kg (145 lb 1 oz)   LMP  (LMP Unknown)   SpO2 96%   BMI 24.90 kg/m²     General: Alert, oriented. No apparent distress. Affect better  Conjunctivae anicteric. Tympanic membranes clear   Neck supple. No cervical lymphadenopathy  Respiratory effort normal. Lungs clear to auscultation.   Heart: Regular rate and rhythm without murmurs, gallops or rubs.   No lower extremity edema.       3/31/20 Mini mental status exam 28/30  - missed the season and the month.       ASSESSMENT AND PLAN:   1. Grief/anxiety and depression -better. increase wellbutrin  mg daily and continue celexa 40 mg daily  2. Interstitial cystitis - asx  3. Reflux - asx  4. CLL -stable  5. History of thyroid nodules - thyroid ultrasound stable 1/16  6. Hyperlipidemia -continue to work on diet, exercise and weight loss and flax seed oil. Does not want lipid lowering medications    7.  I'll see her back in 1 month, sooner if problems arise.     "

## 2020-05-06 ENCOUNTER — TELEPHONE (OUTPATIENT)
Dept: INTERNAL MEDICINE | Facility: CLINIC | Age: 80
End: 2020-05-06

## 2020-05-06 DIAGNOSIS — F43.21 GRIEF: Primary | ICD-10-CM

## 2020-05-06 NOTE — LETTER
May 6, 2020    Jorge Montgomery  [address]  [facility] [address]       Date of Birth 1940      [facility]  [address]  [facility] [address]  Phone: [phone]  Fax: [phone] To Whom It May Concern:    Mrs Deborah Montgomery does not have memory loss. Mini mental status exam was 28/30 on 3/31/20.      Sincerely,      Amirah Flowers MD

## 2020-05-06 NOTE — TELEPHONE ENCOUNTER
Letter written. She may  or we can mail    She is having significant stressors with her son. He is trying to get power  over her and put her in a nursing home.    She is to have full neuropsychological testing.    She is anxious, depressed and grieving. Referral to community mental health worker placed to help with mood

## 2020-05-06 NOTE — TELEPHONE ENCOUNTER
Pt needs a letter stating that she is NOT incompetent. Pt states that her son is trying to put her in a Nursing Home because he says that she has dementia. She states that she is grieving over her  she doesn't have any memory issues.

## 2020-05-06 NOTE — TELEPHONE ENCOUNTER
----- Message from Althea Boyer sent at 5/6/2020  8:07 AM CDT -----  Contact: self/[phone]  Pt would like a call back from the nurse. Pt did not want to give details.Please call and advise.

## 2020-05-09 NOTE — TELEPHONE ENCOUNTER
----- Message from Getachew Thomas sent at 5/9/2020  2:47 PM CDT -----  Contact: Pt  Pt called and would like Dr. Flowers to call her back    Pt would like the doctor to write a letter for her    Pt can be reached at [phone]

## 2020-05-12 ENCOUNTER — TELEPHONE (OUTPATIENT)
Dept: NEUROLOGY | Facility: CLINIC | Age: 80
End: 2020-05-12

## 2020-05-12 NOTE — TELEPHONE ENCOUNTER
----- Message from Ailin Calixto sent at 5/12/2020  7:17 AM CDT -----  Contact: pt granddaughter Lori Torresaro  # [phone]  Request to schedule an appt, pt may have dementia

## 2020-05-15 ENCOUNTER — TELEPHONE (OUTPATIENT)
Dept: INTERNAL MEDICINE | Facility: CLINIC | Age: 80
End: 2020-05-15

## 2020-05-15 NOTE — TELEPHONE ENCOUNTER
----- Message from Katelynn Brandon sent at 5/15/2020  2:17 PM CDT -----  Contact: Self [phone]  Patient would like an call back form the nurse in regards to personal matter, please advise.

## 2020-05-16 NOTE — TELEPHONE ENCOUNTER
----- Message from Sharon [facility] sent at 5/16/2020  9:46 AM CDT -----  Contact: [phone]  Patient would like to speak to the nurse in regards to a personal matter. No details given. Please call and advise.
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
## 2020-05-17 NOTE — TELEPHONE ENCOUNTER
She needs to call and get an apt for neuropsychological testing at [facility]. Let us know if it is very far away.  She could see neurology as well.  She needs to get an  to protect herself. Her son cannot force her to go to another doctor. .  She also needs to discuss these isisues with her daughter.

## 2020-05-18 ENCOUNTER — TELEPHONE (OUTPATIENT)
Dept: NEUROLOGY | Facility: CLINIC | Age: 80
End: 2020-05-18

## 2020-05-18 ENCOUNTER — TELEPHONE (OUTPATIENT)
Dept: INTERNAL MEDICINE | Facility: CLINIC | Age: 80
End: 2020-05-18

## 2020-05-18 ENCOUNTER — TELEPHONE (OUTPATIENT)
Dept: BEHAVIORAL HEALTH | Facility: CLINIC | Age: 80
End: 2020-05-18

## 2020-05-18 ENCOUNTER — HOSPITAL ENCOUNTER (OUTPATIENT)
Dept: RADIOLOGY | Facility: HOSPITAL | Age: 80
Discharge: HOME | End: 2020-05-18
Attending: INTERNAL MEDICINE
Payer: MEDICARE

## 2020-05-18 DIAGNOSIS — Z12.31 SCREENING MAMMOGRAM, ENCOUNTER FOR: ICD-10-CM

## 2020-05-18 PROCEDURE — 77067 SCR MAMMO BI INCL CAD: CPT | Mod: TC

## 2020-05-18 PROCEDURE — 77067 SCR MAMMO BI INCL CAD: CPT | Mod: 26,,, | Performed by: RADIOLOGY

## 2020-05-18 PROCEDURE — 77063 BREAST TOMOSYNTHESIS BI: CPT | Mod: 26,,, | Performed by: RADIOLOGY

## 2020-05-18 PROCEDURE — 77063 MAMMO DIGITAL SCREENING BILAT WITH TOMOSYNTHESIS_CAD: ICD-10-PCS | Mod: 26,,, | Performed by: RADIOLOGY

## 2020-05-18 PROCEDURE — 77067 MAMMO DIGITAL SCREENING BILAT WITH TOMOSYNTHESIS_CAD: ICD-10-PCS | Mod: 26,,, | Performed by: RADIOLOGY

## 2020-05-18 NOTE — PROGRESS NOTES
Called pt to explain the [facility] program pt was a little confused. Pt stated that she would like to do the  neuropsychological testing first before enrolling

## 2020-05-18 NOTE — TELEPHONE ENCOUNTER
----- Message from Viridiana Abreu sent at 5/13/2020 12:03 PM CDT -----  Contact: Lori (dtr) @ [phone]  Asking to schedule an appt for patient with symptoms of dementia and memory loss.

## 2020-05-18 NOTE — TELEPHONE ENCOUNTER
----- Message from Yecenia Nolasco sent at 5/18/2020  1:04 PM CDT -----  Needs Advice    Reason for call:--Letter--        Communication Preference:--Jorge--[phone]--    Additional Information:Pt call to see if the letter was sent to the for her neuro testing  appointment. Please call to advise

## 2020-05-25 ENCOUNTER — OFFICE VISIT (OUTPATIENT)
Dept: INTERNAL MEDICINE | Facility: CLINIC | Age: 80
End: 2020-05-25
Payer: MEDICARE

## 2020-05-25 ENCOUNTER — TELEPHONE (OUTPATIENT)
Dept: INTERNAL MEDICINE | Facility: CLINIC | Age: 80
End: 2020-05-25

## 2020-05-25 VITALS
DIASTOLIC BLOOD PRESSURE: 62 MMHG | HEART RATE: 75 BPM | SYSTOLIC BLOOD PRESSURE: 120 MMHG | HEIGHT: 64 IN | BODY MASS INDEX: 24.72 KG/M2 | WEIGHT: 144.81 LBS | OXYGEN SATURATION: 98 %

## 2020-05-25 DIAGNOSIS — E78.5 HYPERLIPIDEMIA, UNSPECIFIED HYPERLIPIDEMIA TYPE: ICD-10-CM

## 2020-05-25 DIAGNOSIS — C91.10 CHRONIC LYMPHOCYTIC LEUKEMIA: ICD-10-CM

## 2020-05-25 DIAGNOSIS — F32.0 MAJOR DEPRESSIVE DISORDER, SINGLE EPISODE, MILD: Primary | ICD-10-CM

## 2020-05-25 DIAGNOSIS — F43.21 GRIEF: ICD-10-CM

## 2020-05-25 PROCEDURE — 1159F PR MEDICATION LIST DOCUMENTED IN MEDICAL RECORD: ICD-10-PCS | Mod: S$GLB,,, | Performed by: INTERNAL MEDICINE

## 2020-05-25 PROCEDURE — 99214 PR OFFICE/OUTPT VISIT, EST, LEVL IV, 30-39 MIN: ICD-10-PCS | Mod: S$GLB,,, | Performed by: INTERNAL MEDICINE

## 2020-05-25 PROCEDURE — 99999 PR PBB SHADOW E&M-EST. PATIENT-LVL IV: CPT | Mod: PBBFAC,,, | Performed by: INTERNAL MEDICINE

## 2020-05-25 PROCEDURE — 1101F PT FALLS ASSESS-DOCD LE1/YR: CPT | Mod: CPTII,S$GLB,, | Performed by: INTERNAL MEDICINE

## 2020-05-25 PROCEDURE — 99214 OFFICE O/P EST MOD 30 MIN: CPT | Mod: S$GLB,,, | Performed by: INTERNAL MEDICINE

## 2020-05-25 PROCEDURE — 1101F PR PT FALLS ASSESS DOC 0-1 FALLS W/OUT INJ PAST YR: ICD-10-PCS | Mod: CPTII,S$GLB,, | Performed by: INTERNAL MEDICINE

## 2020-05-25 PROCEDURE — 99999 PR PBB SHADOW E&M-EST. PATIENT-LVL IV: ICD-10-PCS | Mod: PBBFAC,,, | Performed by: INTERNAL MEDICINE

## 2020-05-25 PROCEDURE — 1159F MED LIST DOCD IN RCRD: CPT | Mod: S$GLB,,, | Performed by: INTERNAL MEDICINE

## 2020-05-25 RX ORDER — BUPROPION HYDROCHLORIDE 150 MG/1
150 TABLET ORAL DAILY
Qty: 30 TABLET | Refills: 11 | Status: SHIPPED | OUTPATIENT
Start: 2020-05-25 | End: 2021-03-19

## 2020-05-25 NOTE — PATIENT INSTRUCTIONS
Take Buproprion  mg one tablet daily PLUS 150 mg one tablet daily to equal 450 mg daily to help with mood.    Continue citalopram 40 mg one tablet daily.

## 2020-05-25 NOTE — TELEPHONE ENCOUNTER
----- Message from Sharon [facility] sent at 5/25/2020  9:07 AM CDT -----  Contact: [phone]  Patient would like to speak to the nurse in regards to a personal matter. No details given. Please advise.

## 2020-05-25 NOTE — PROGRESS NOTES
CHIEF COMPLAINT: Follow up of grief, CLL, stress, reflux, allergies, dizziness    HISTORY OF PRESENT ILLNESS: This is a 79-year-old woman who presents for follow up of above.     Lori Clinton, her granddaughter is with her today.  Lori presents a special limited medical power of  signed by Mrs [facility]. This limited medical power of  gives Lori Jijarekyodit and Patel Montgomery power over health related issues. .     Lori brought her to see a neurologist, Dr Sauceda at [facility] this morning.  Mrs [facility] reports that her memory loss could be due to her depression.  Blood work was done. She will have an EEG and will follow up with him in a month.      Mrs [facility] currently takes Wellbutrin  mg daily and celexa 40 mg daily for her grief and mood. She feels that she is doing a little better. She is not crying as much.  She has an apt to be seen in psychiatry on 6/10/20. Her   2020 after 40 days in the ICU from a heart attack. Her  refused to go to the doctor when he was feeling bad until he had a massive heart attack. She is grieving his death. She is very angry at him for not getting help.      No abdominal pain.  Her interstitial cystitis is asx with diet  No dysuria or hematuria     Her WBC for her CLL has been stable. No fever, chills, night sweatts, weight loss. She saw [facility] Onc 2019     She has not needed omeprazole daily.      She takes loratadine 10 mg daily for allergies.      No fever, chills, visual issues, hearing issues, chest pain, shortness of breath, nausea, vomiting, constipation, diarrhea, dysuria, hematuria, joint pain, muscle pain, rashes, headaches, insomnia.        PAST MEDICAL HISTORY:   1. Reflux.   2. Hyperlipidemia.   3. Interstitial cystitis   4. Chronic lymphocytic leukemia diagnosed . No need for treatment at this point in time.   5. Situational stress with anxiety and depression.   6. History of back pain in 2007 - resolved.  "  7. History of liver biopsy  which revealed a hemangioma.     PAST SURGICAL HISTORY:   1. .   2. Hysterectomy.   3. Cholecystectomy.   4. Ganglion cyst removed from the left foot.     SOCIAL HISTORY: She is a past smoker, quit smoking 20 years ago; she   smoked a pack per week on and off for 5-10 years. No alcohol.    with two children. She has a daughter and a son.    MEDICATIONS and ALLERGIES: Updated on EPIC     PHYSICAL EXAMINATION:      /62 (BP Location: Left arm, Patient Position: Sitting, BP Method: Medium (Manual))   Pulse 75   Ht 5' 4" (1.626 m)   Wt 65.7 kg (144 lb 13.5 oz)   LMP  (LMP Unknown)   SpO2 98%   BMI 24.86 kg/m²     General: Alert, oriented. No apparent distress. Affect better  Conjunctivae anicteric. Tympanic membranes clear   Neck supple. No cervical lymphadenopathy  Respiratory effort normal. Lungs clear to auscultation.   Heart: Regular rate and rhythm without murmurs, gallops or rubs.   No lower extremity edema.       3/31/20 Mini mental status exam   - missed the season and the month.       ASSESSMENT AND PLAN:   1. Grief/anxiety and depression -better. increase wellbutrin XL to 450 mg daily and continue celexa 40 mg daily. Establish care in psychiatry.   2. Interstitial cystitis - asx  3. Reflux - asx  4. CLL -stable  5. History of thyroid nodules - thyroid ultrasound stable   6. Hyperlipidemia -continue to work on diet, exercise and weight loss and flax seed oil. Does not want lipid lowering medications    7.  I'll see her back in 1 month, sooner if problems arise.     "

## 2020-05-26 ENCOUNTER — TELEPHONE (OUTPATIENT)
Dept: INTERNAL MEDICINE | Facility: CLINIC | Age: 80
End: 2020-05-26

## 2020-05-26 NOTE — TELEPHONE ENCOUNTER
----- Message from Sharon [facility] sent at 5/26/2020  3:06 PM CDT -----  Contact: [phone]  Patient would like to speak to the nurse in regards to EKG appointment. Please advise

## 2020-05-26 NOTE — TELEPHONE ENCOUNTER
I did not order an EKG.  The neurologist at [facility] wants to do an EEG.    She needs neuropsychological testing in psychiatry [phone]

## 2020-05-27 RX ORDER — BUPROPION HYDROCHLORIDE 150 MG/1
150 TABLET ORAL DAILY
Qty: 30 TABLET | Refills: 11 | Status: CANCELLED | OUTPATIENT
Start: 2020-05-27 | End: 2021-05-27

## 2020-05-27 RX ORDER — BUPROPION HYDROCHLORIDE 300 MG/1
300 TABLET ORAL DAILY
Qty: 30 TABLET | Refills: 3 | Status: SHIPPED | OUTPATIENT
Start: 2020-05-27 | End: 2021-03-19

## 2020-05-27 NOTE — TELEPHONE ENCOUNTER
----- Message from Lev Julien sent at 5/27/2020  4:25 PM CDT -----  Contact: Pt [phone]  Is this a refill or new RX:  Refill    RX name and strength: buPROPion (WELLBUTRIN XL) 300 MG 24 hr tablet     Pharmacy name and phone # [id number] - [place], [place] - [address] AT [facility] [phone] (Phone) [phone] (Fax)

## 2020-05-29 ENCOUNTER — TELEPHONE (OUTPATIENT)
Dept: INTERNAL MEDICINE | Facility: CLINIC | Age: 80
End: 2020-05-29

## 2020-05-29 NOTE — TELEPHONE ENCOUNTER
----- Message from Salima Soto sent at 5/29/2020 10:55 AM CDT -----  Contact: self/ [phone]  Please call patient about a letter she need the doctor to write.

## 2020-06-01 ENCOUNTER — TELEPHONE (OUTPATIENT)
Dept: INTERNAL MEDICINE | Facility: CLINIC | Age: 80
End: 2020-06-01

## 2020-06-01 DIAGNOSIS — R41.3 MEMORY LOSS: Primary | ICD-10-CM

## 2020-06-01 NOTE — TELEPHONE ENCOUNTER
----- Message from Jose Garzon sent at 6/1/2020 10:11 AM CDT -----  Contact: Lori Subramanian    Lori called in regards to having medical concerns for the patient and would like to speak with Dr Flowers.
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
## 2020-06-03 ENCOUNTER — TELEPHONE (OUTPATIENT)
Dept: INTERNAL MEDICINE | Facility: CLINIC | Age: 80
End: 2020-06-03

## 2020-06-03 ENCOUNTER — TELEPHONE (OUTPATIENT)
Dept: BEHAVIORAL HEALTH | Facility: CLINIC | Age: 80
End: 2020-06-03
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
## 2020-06-03 NOTE — TELEPHONE ENCOUNTER
Spoke with Lori.  Had an apt for EEG on 6/8/20.  Patient cancelled the EEG apt.     Pt cancelled the apt in psychiatry on 6/10/20.    Lori is to schedule neuropsychological testing and psychology apt.  Phone number given to Lori

## 2020-06-03 NOTE — TELEPHONE ENCOUNTER
Completed forms as requested and placed on Dr Gudino's desk for signature   Pt has neuro psych testing scheduled. Pt granddaughter would like to know what she can do. What is best for the pt. She would like PCP to give her a call to discuss.

## 2020-06-06 ENCOUNTER — OFFICE VISIT (OUTPATIENT)
Dept: INTERNAL MEDICINE | Facility: CLINIC | Age: 80
End: 2020-06-06
Payer: MEDICARE

## 2020-06-06 ENCOUNTER — TELEPHONE (OUTPATIENT)
Dept: INTERNAL MEDICINE | Facility: CLINIC | Age: 80
End: 2020-06-06

## 2020-06-06 VITALS
HEIGHT: 64 IN | WEIGHT: 140 LBS | DIASTOLIC BLOOD PRESSURE: 72 MMHG | SYSTOLIC BLOOD PRESSURE: 120 MMHG | BODY MASS INDEX: 23.9 KG/M2 | HEART RATE: 77 BPM | OXYGEN SATURATION: 96 % | TEMPERATURE: 99 F

## 2020-06-06 DIAGNOSIS — R07.89 MUSCULOSKELETAL CHEST PAIN: Primary | ICD-10-CM

## 2020-06-06 PROCEDURE — 99214 OFFICE O/P EST MOD 30 MIN: CPT | Mod: S$GLB,,, | Performed by: NURSE PRACTITIONER

## 2020-06-06 PROCEDURE — 1101F PT FALLS ASSESS-DOCD LE1/YR: CPT | Mod: CPTII,S$GLB,, | Performed by: NURSE PRACTITIONER

## 2020-06-06 PROCEDURE — 99999 PR PBB SHADOW E&M-EST. PATIENT-LVL III: CPT | Mod: PBBFAC,,, | Performed by: NURSE PRACTITIONER

## 2020-06-06 PROCEDURE — 99999 PR PBB SHADOW E&M-EST. PATIENT-LVL III: ICD-10-PCS | Mod: PBBFAC,,, | Performed by: NURSE PRACTITIONER

## 2020-06-06 PROCEDURE — 1101F PR PT FALLS ASSESS DOC 0-1 FALLS W/OUT INJ PAST YR: ICD-10-PCS | Mod: CPTII,S$GLB,, | Performed by: NURSE PRACTITIONER

## 2020-06-06 PROCEDURE — 1159F PR MEDICATION LIST DOCUMENTED IN MEDICAL RECORD: ICD-10-PCS | Mod: S$GLB,,, | Performed by: NURSE PRACTITIONER

## 2020-06-06 PROCEDURE — 99214 PR OFFICE/OUTPT VISIT, EST, LEVL IV, 30-39 MIN: ICD-10-PCS | Mod: S$GLB,,, | Performed by: NURSE PRACTITIONER

## 2020-06-06 PROCEDURE — 1159F MED LIST DOCD IN RCRD: CPT | Mod: S$GLB,,, | Performed by: NURSE PRACTITIONER
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
## 2020-06-06 NOTE — TELEPHONE ENCOUNTER
----- Message from Lev Julien sent at 6/6/2020  9:27 AM CDT -----  Contact: Pt [phone]  Patient is returning a phone call.  Who left a message for the patient: Paul  Does patient know what this is regarding:  No.

## 2020-06-08 ENCOUNTER — NURSE TRIAGE (OUTPATIENT)
Dept: ADMINISTRATIVE | Facility: CLINIC | Age: 80
End: 2020-06-08

## 2020-06-08 NOTE — TELEPHONE ENCOUNTER
Spoke with patient she states that she took 450 mg of Wellbutrin last night and Celexa 40 mg.  She states that she recently started taking Wellbutrin on 5/25/2020.  Patient reports that this morning she was feeling dizzy and hallucinating she reports that she was seeing floaters on the wall and other objects that were not there.  Patient also states that it was hard for her to walk this morning.  Patient states that she currently has no symptoms at this time but wants to know what Dr. Flowers recommends in regards to her dosage.  Will send a message to office.  Advised patient to call back if she did not hear from office within the hour.  Patient verbalized understanding.     Reason for Disposition   Caller has urgent medication question about med that PCP prescribed and triager unable to answer question    Protocols used: MEDICATION QUESTION CALL-A-AH

## 2020-06-08 NOTE — TELEPHONE ENCOUNTER
She is to decrease the Wellbutrin (buproprion) to 300 mg and take it in the morning.  She is to take the citalopram 40 mg in the evening.

## 2020-06-10 ENCOUNTER — OFFICE VISIT (OUTPATIENT)
Dept: PSYCHIATRY | Facility: CLINIC | Age: 80
End: 2020-06-10
Payer: MEDICARE

## 2020-06-10 DIAGNOSIS — F43.21 GRIEF: ICD-10-CM

## 2020-06-10 DIAGNOSIS — F41.9 ANXIETY: ICD-10-CM

## 2020-06-10 DIAGNOSIS — F32.A DEPRESSION, UNSPECIFIED DEPRESSION TYPE: Primary | ICD-10-CM

## 2020-06-10 PROCEDURE — 90791 PSYCH DIAGNOSTIC EVALUATION: CPT | Mod: 95,,, | Performed by: PSYCHOLOGIST

## 2020-06-10 PROCEDURE — 90791 PR PSYCHIATRIC DIAGNOSTIC EVALUATION: ICD-10-PCS | Mod: 95,,, | Performed by: PSYCHOLOGIST

## 2020-06-10 NOTE — PROGRESS NOTES
The patient location is: home   The chief complaint leading to consultation is: depression; anxiety     Visit type: audiovisual    Face to Face time with patient: 55 minutes   60 minutes of total time spent on the encounter, which includes face to face time and non-face to face time preparing to see the patient (eg, review of tests), Obtaining and/or reviewing separately obtained history, Documenting clinical information in the electronic or other health record, Independently interpreting results (not separately reported) and communicating results to the patient/family/caregiver, or Care coordination (not separately reported).         Each patient to whom he or she provides medical services by telemedicine is:  (1) informed of the relationship between the physician and patient and the respective role of any other health care provider with respect to management of the patient; and (2) notified that he or she may decline to receive medical services by telemedicine and may withdraw from such care at any time.    Note: part of the session was done by telephone due to technological difficulties re: sound quality.       Psychiatry Initial Visit (PhD/LCSW)  Diagnostic Interview - CPT 70008    Date: 6/10/2020    Site: [facility]    Referral source: Dr. Amirah Flowers     Clinical status of patient: Outpatient    Jorge Montgomery, a 79 y.o. female, for initial evaluation visit.  Met with patient. (note: daughter was briefly with the pt when we encountered technical difficulties).     Chief complaint/reason for encounter: depression, anger and anxiety    History of present illness: Pt is a 79 y.o woman who was referred by her pcp, Dr. Flowers, for psychotherapy for her depression. Pt explained that she lost her spouse in January of this year due to a heart attack. Pt said he had refused her requests to seek medical care until it was too late. Pt had been  for 55 years. She described the marriage as  "difficult and one she was never happy with due to her spouse's extremely controlling behavior. She notes that her depression started to be problematic after the birth of her second child about 36 years ago. She said the depression would come and go, but that his sudden loss earlier this year was made worse by the fact that she became overwhelmed by the responsibilities that were suddenly thrust upon her.  Pt says she is starting to do better with handling the responsibilities that were thrust upon her. She is getting some help from her son with managing the apartments left to her by her spouse and she intends to sell them. She has decided to start paying her bills the old fashion way rather than by computer which is alien to her. In addition to depression and crying spells she c/o of anger (about putting up her spouse's treatement), weight loss, excessive worry and irritability. She notes that she is very close to two of her granddaughters (Darlene age 32 and Rona age 8). She has been very involved in the care of Rona. She also notes that she has friends with whom she goes to lunch regularly. She explained that she eventually rebelled against her spouse's wishes and announced that she would go to lunch with her friends despite his displeasure.   Pt noted that she has one close friend in whom she feels comfortable to confide. She appeared uncertain about scheduling a f/u appointment but agreed that it was probably good to talk over things with a counselor and we set up a session for next week.   Pain: 0    Symptoms:   · Mood: depressed mood, weight loss and tearfulness  · Anxiety: decreased memory, excessive anxiety/worry and irritability  · Substance abuse: denied  · Cognitive functioning: denied  · Health behaviors: noncontributory    Psychiatric history: psychotropic management by PCP    Medical history: Lymphoma (see chart)     Family history of psychiatric illness: Father was "alcoholic'    Social history " "(marriage, employment, etc.): Pt recently lost her spouse after 55 years of marriage. She had 3 children; 2 survived (female 55; male 36). She has 3 grandchildren. She also had a downs syndrome baby girl who  at 3 months. She was the oldest of 3 sibs. Pt did work as a . Her spouse was in business for himself. Pt is a practicing Hindu. She describes her current financial position as stable and adequate.     Substance use:   Alcohol: none   Drugs: none   Tobacco: none   Caffeine soda's     rrent medications and drug reactions (include OTC, herbal): see medication list     Strengths and liabsodailities: Strength: Patient has positive support network., Strength: Patient has reasonable judgment., Liability: Patient has poor health.    Current Evaluation:     Mental Status Exam:  General Appearance:  unremarkable, age appropriate   Speech: normal tone, normal rate, normal pitch, normal volume      Level of Cooperation: cooperative      Thought Processes: normal and logical   Mood: angry, anxious, depressed      Thought Content: normal, no suicidality, no homicidality, delusions, or paranoia   Affect: congruent and appropriate   Orientation: Oriented x3   Memory: recent >  remembered 2 of 3 words after brief delay   Attention Span & Concentration: spelled "WORLD" forwards and backwards   Fund of General Knowledge: 2 of 3 recent president remembered    Abstract Reasoning: interpretation of similarities was abstract, interpretation of proverbs was abstract   Judgment & Insight: fair     Language  intact     Diagnostic Impression - Plan:     Diagnosis: Depression, Anxiety, Grief    Plan:individual psychotherapy and medication management by physician    Return to Clinic: as scheduled    Length of Service (minutes): 60    "

## 2020-06-20 ENCOUNTER — TELEPHONE (OUTPATIENT)
Dept: INTERNAL MEDICINE | Facility: CLINIC | Age: 80
End: 2020-06-20
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
## 2020-06-24 ENCOUNTER — TELEPHONE (OUTPATIENT)
Dept: NEUROLOGY | Facility: CLINIC | Age: 80
End: 2020-06-24

## 2020-06-24 ENCOUNTER — OFFICE VISIT (OUTPATIENT)
Dept: PSYCHIATRY | Facility: CLINIC | Age: 80
End: 2020-06-24
Payer: COMMERCIAL

## 2020-06-24 ENCOUNTER — TELEPHONE (OUTPATIENT)
Dept: INTERNAL MEDICINE | Facility: CLINIC | Age: 80
End: 2020-06-24

## 2020-06-24 DIAGNOSIS — F32.A DEPRESSION, UNSPECIFIED DEPRESSION TYPE: Primary | ICD-10-CM

## 2020-06-24 DIAGNOSIS — R41.3 MEMORY LOSS: Primary | ICD-10-CM

## 2020-06-24 DIAGNOSIS — F41.9 ANXIETY: ICD-10-CM

## 2020-06-24 PROCEDURE — 90834 PR PSYCHOTHERAPY W/PATIENT, 45 MIN: ICD-10-PCS | Mod: 95,,, | Performed by: PSYCHOLOGIST

## 2020-06-24 PROCEDURE — 90834 PSYTX W PT 45 MINUTES: CPT | Mod: 95,,, | Performed by: PSYCHOLOGIST
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
## 2020-06-24 NOTE — TELEPHONE ENCOUNTER
----- Message from Marah Hathaway sent at 6/24/2020  8:53 AM CDT -----  Regarding: need updated ref  Good morning!     The attached pt called for an appt, however the ref code is not correct on the ref and it didn't make it to our que.     Can you change the ref to [id number].

## 2020-06-24 NOTE — TELEPHONE ENCOUNTER
I have placed this order. I want neuropsychological testing. Which I have ordered as well.   Thanks

## 2020-07-07 ENCOUNTER — OFFICE VISIT (OUTPATIENT)
Dept: NEUROLOGY | Facility: CLINIC | Age: 80
End: 2020-07-07
Payer: COMMERCIAL

## 2020-07-07 ENCOUNTER — TELEPHONE (OUTPATIENT)
Dept: NEUROLOGY | Facility: CLINIC | Age: 80
End: 2020-07-07

## 2020-07-07 DIAGNOSIS — R41.3 MEMORY LOSS: ICD-10-CM

## 2020-07-07 DIAGNOSIS — F32.0 MAJOR DEPRESSIVE DISORDER, SINGLE EPISODE, MILD: Primary | ICD-10-CM

## 2020-07-07 PROCEDURE — 99499 NO LOS: ICD-10-PCS | Mod: 95,,, | Performed by: CLINICAL NEUROPSYCHOLOGIST

## 2020-07-07 PROCEDURE — 90791 PSYCH DIAGNOSTIC EVALUATION: CPT | Mod: 95,,, | Performed by: CLINICAL NEUROPSYCHOLOGIST

## 2020-07-07 PROCEDURE — 90791 PR PSYCHIATRIC DIAGNOSTIC EVALUATION: ICD-10-PCS | Mod: 95,,, | Performed by: CLINICAL NEUROPSYCHOLOGIST

## 2020-07-07 PROCEDURE — 99499 UNLISTED E&M SERVICE: CPT | Mod: 95,,, | Performed by: CLINICAL NEUROPSYCHOLOGIST
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
## 2020-07-09 ENCOUNTER — TELEPHONE (OUTPATIENT)
Dept: NEUROLOGY | Facility: CLINIC | Age: 80
End: 2020-07-09

## 2020-07-10 ENCOUNTER — INITIAL CONSULT (OUTPATIENT)
Dept: NEUROLOGY | Facility: CLINIC | Age: 80
End: 2020-07-10
Payer: MEDICARE

## 2020-07-10 DIAGNOSIS — F43.22 ADJUSTMENT DISORDER WITH ANXIOUS MOOD: ICD-10-CM

## 2020-07-10 DIAGNOSIS — R41.3 MEMORY LOSS: ICD-10-CM

## 2020-07-10 DIAGNOSIS — F03.91 MAJOR NEUROCOGNITIVE DISORDER DUE TO ALZHEIMER'S DISEASE, PROBABLE, WITH BEHAVIORAL DISTURBANCE: ICD-10-CM

## 2020-07-10 DIAGNOSIS — F32.0 MAJOR DEPRESSIVE DISORDER, SINGLE EPISODE, MILD: ICD-10-CM

## 2020-07-10 DIAGNOSIS — E78.5 HYPERLIPIDEMIA, UNSPECIFIED HYPERLIPIDEMIA TYPE: ICD-10-CM

## 2020-07-10 DIAGNOSIS — G31.84 MILD COGNITIVE IMPAIRMENT WITH MEMORY LOSS: Primary | ICD-10-CM

## 2020-07-10 PROCEDURE — 96139 PSYCL/NRPSYC TST TECH EA: CPT | Mod: S$GLB,,, | Performed by: CLINICAL NEUROPSYCHOLOGIST

## 2020-07-10 PROCEDURE — 96133 PR NEUROPSYCHOLOGIC TEST EVAL SVCS, EA ADDTL HR: ICD-10-PCS | Mod: S$GLB,,, | Performed by: CLINICAL NEUROPSYCHOLOGIST

## 2020-07-10 PROCEDURE — 96139 PR PSYCH/NEUROPSYCH TEST ADMIN/SCORING, BY TECH, 2+ TESTS, EA ADDTL 30 MIN: ICD-10-PCS | Mod: S$GLB,,, | Performed by: CLINICAL NEUROPSYCHOLOGIST

## 2020-07-10 PROCEDURE — 96138 PSYCL/NRPSYC TECH 1ST: CPT | Mod: S$GLB,,, | Performed by: CLINICAL NEUROPSYCHOLOGIST

## 2020-07-10 PROCEDURE — 99999 PR PBB SHADOW E&M-EST. PATIENT-LVL I: ICD-10-PCS | Mod: PBBFAC,,, | Performed by: CLINICAL NEUROPSYCHOLOGIST

## 2020-07-10 PROCEDURE — 99499 NO LOS: ICD-10-PCS | Mod: S$GLB,,, | Performed by: CLINICAL NEUROPSYCHOLOGIST

## 2020-07-10 PROCEDURE — 99499 UNLISTED E&M SERVICE: CPT | Mod: S$GLB,,, | Performed by: CLINICAL NEUROPSYCHOLOGIST

## 2020-07-10 PROCEDURE — 96132 NRPSYC TST EVAL PHYS/QHP 1ST: CPT | Mod: S$GLB,,, | Performed by: CLINICAL NEUROPSYCHOLOGIST

## 2020-07-10 PROCEDURE — 96138 PR PSYCH/NEUROPSYCH TEST ADMIN/SCORING, BY TECH, 2+ TESTS, 1ST 30 MIN: ICD-10-PCS | Mod: S$GLB,,, | Performed by: CLINICAL NEUROPSYCHOLOGIST

## 2020-07-10 PROCEDURE — 99999 PR PBB SHADOW E&M-EST. PATIENT-LVL I: CPT | Mod: PBBFAC,,, | Performed by: CLINICAL NEUROPSYCHOLOGIST

## 2020-07-10 PROCEDURE — 96133 NRPSYC TST EVAL PHYS/QHP EA: CPT | Mod: S$GLB,,, | Performed by: CLINICAL NEUROPSYCHOLOGIST

## 2020-07-10 PROCEDURE — 96132 PR NEUROPSYCHOLOGIC TEST EVAL SVCS, 1ST HR: ICD-10-PCS | Mod: S$GLB,,, | Performed by: CLINICAL NEUROPSYCHOLOGIST

## 2020-07-13 PROBLEM — R41.3 MEMORY LOSS: Status: ACTIVE | Noted: 2020-07-13

## 2020-07-16 ENCOUNTER — OFFICE VISIT (OUTPATIENT)
Dept: PSYCHIATRY | Facility: CLINIC | Age: 80
End: 2020-07-16
Payer: MEDICARE

## 2020-07-16 ENCOUNTER — TELEPHONE (OUTPATIENT)
Dept: NEUROLOGY | Facility: CLINIC | Age: 80
End: 2020-07-16

## 2020-07-16 DIAGNOSIS — F41.9 ANXIETY: ICD-10-CM

## 2020-07-16 DIAGNOSIS — F32.A DEPRESSION, UNSPECIFIED DEPRESSION TYPE: Primary | ICD-10-CM

## 2020-07-16 PROCEDURE — 90834 PR PSYCHOTHERAPY W/PATIENT, 45 MIN: ICD-10-PCS | Mod: 95,,, | Performed by: PSYCHOLOGIST

## 2020-07-16 PROCEDURE — 90834 PSYTX W PT 45 MINUTES: CPT | Mod: 95,,, | Performed by: PSYCHOLOGIST
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
## 2020-07-22 ENCOUNTER — TELEPHONE (OUTPATIENT)
Dept: INTERNAL MEDICINE | Facility: CLINIC | Age: 80
End: 2020-07-22

## 2020-07-22 PROBLEM — G31.84 MILD COGNITIVE IMPAIRMENT WITH MEMORY LOSS: Status: ACTIVE | Noted: 2020-07-13

## 2020-07-22 PROBLEM — F43.22 ADJUSTMENT DISORDER WITH ANXIOUS MOOD: Status: ACTIVE | Noted: 2020-07-22

## 2020-07-22 NOTE — TELEPHONE ENCOUNTER
Please contact her granddaughter - Lori Clinton -   [phone]    Dr KOEHLER has the results of the neuropsychological testing.  DR KOEHLER would like to see patient with granddaughter to review results of the testing.   Please book an apt July 29 or 31 or could come on Saturday August 15.

## 2020-07-22 NOTE — ASSESSMENT & PLAN NOTE
When results of the current evaluation are compared to average range premorbid estimates (using both demographic information and a word reading performance), her attention, working memory, and processing speed were commensurate with expectations. Language was largely at expectation, though confrontation naming and semantic fluency varied from average to below average across tasks. Mild weaknesses are seen on executive functioning tasks, which then appeared to impact her visuospatial constructional abilities (poor organization and integration of design elements).     Significant deficits, however, were seen in the patient's learning and memory across four different measures. She displayed a flattened learning curve and freely recall 1 unit of information across all four measures. She sometimes benefited from recognition cueing, while other times she did not. These scores are very concerning for an emerging Alzheimer's dementia and deserve monitoring over time (she was oriented x 4 with the exception of stating the incorrect day of the month). While the patient reported intact IADL management, conversations with her granddaughter and PCP indicate functional decline and thus, a diagnosis of Major Neurocognitive Disorder is appropriate at this time.     Psychological factors were currently indicated to be mild in severity (both depression and anxiety). Thus, while they may have exacerbating some of her cognitive weaknesses, psychological factors alone are unlikely to account for the severity of deficits seen.     The following recommendations are made:     Oversight - I believe this patient would benefit from additional oversight to ensure that she is not making errors in IADLs.     Driving - The cognitive impairments observed in this evaluation raise concern for the patients driving safety. As a result, I strongly recommend a driving evaluation. I can provide a referral for an evaluation at [facility] (there is a cost)  or the family can see one in the community, as well. This evaluation can be completed at the [facility] with Jarred Villarreal. If interested, I can place a referral and the family can contact Catrina Washington at [phone] for scheduling.    Legal Documents - It is recommended that legal documents be put into place to allow others to make healthcare and financial decisions for this patient should she become unable to do so. She should consider designating a durable power of  for healthcare and finances, completing advanced directives for healthcare decisions, and estate planning (e.g., finalizing a will) at this time. I recommend meeting with an  who specializes in trusts and estates to ensure everything is handled properly.     Medication for Cognition - This patient's prescribing physicians may wish to consider prescribing an acetylcholinesterase inhibitor to help slow thinking changes, particularly if it becomes known that this patient is having greater difficulty in daily functioning than was presently revealed (research shows ACEI are most helpful during a stage of mild dementia as opposed to a stage of mild cognitive impairment).     Continue current psychiatric treatment plan - This will help to minimize impact of psychological factors on cognitive functioning.     Referral to Social Work - This patient would likely benefit from a referral to social work. I will reach out to our , Eli Lan LCSW particularly for assistance with advance care planning documents.     Referral to Neurology - This patient recently established care with a neurologist [place]. However, her granddaughter expressed interest in having care transferred to [facility]. A referral to neurology will be placed.     Behaviors that Promote Brain Health - This patient is encouraged to participate in behaviors known to promote brain health, including maintaining a regular physical exercise regimen,  eating a healthy diet such as the Mediterranean/ MIND diet, maintaining both cognitive and social activities, and getting good, quality sleep.    Resources - The patient and her family are encouraged to explore the following resources: 1) The 36 Hour Day, a resource book written for caregivers of patients with dementia and 2) visit the Alzheimer[employer] of [place] website at http://www.alz.org/[place]/; call their 24 hour Helpline at 1-913.706.7496 for information and support; and/or contact their regional headquarters ([facility] Office, [address] [place] [address], [address], [place] [address], [phone]).     Re-evaluation - in 6 to 12 months to assess for interval changes.

## 2020-07-23 ENCOUNTER — OFFICE VISIT (OUTPATIENT)
Dept: NEUROLOGY | Facility: CLINIC | Age: 80
End: 2020-07-23
Payer: MEDICARE

## 2020-07-23 ENCOUNTER — TELEPHONE (OUTPATIENT)
Dept: NEUROLOGY | Facility: CLINIC | Age: 80
End: 2020-07-23

## 2020-07-23 DIAGNOSIS — F32.0 MAJOR DEPRESSIVE DISORDER, SINGLE EPISODE, MILD: ICD-10-CM

## 2020-07-23 DIAGNOSIS — G31.84 MILD COGNITIVE IMPAIRMENT WITH MEMORY LOSS: ICD-10-CM

## 2020-07-23 DIAGNOSIS — F43.22 ADJUSTMENT DISORDER WITH ANXIOUS MOOD: ICD-10-CM

## 2020-07-23 PROBLEM — F03.91 MAJOR NEUROCOGNITIVE DISORDER DUE TO ALZHEIMER'S DISEASE, PROBABLE, WITH BEHAVIORAL DISTURBANCE: Status: ACTIVE | Noted: 2020-07-13

## 2020-07-23 PROCEDURE — 99499 NO LOS: ICD-10-PCS | Mod: 95,,, | Performed by: CLINICAL NEUROPSYCHOLOGIST

## 2020-07-23 PROCEDURE — 99499 UNLISTED E&M SERVICE: CPT | Mod: 95,,, | Performed by: CLINICAL NEUROPSYCHOLOGIST

## 2020-07-23 NOTE — TELEPHONE ENCOUNTER
Attempting to call patient and her granddaughter for this feedback appointment. No one answering phone and unable to leave a message. Will keep trying.

## 2020-07-23 NOTE — PROGRESS NOTES
NEUROPSYCHOLOGICAL EVALUATION FEEDBACK    Established Patient - Audio Only Telehealth Visit  The patient location is: home  The chief complaint leading to consultation is: feedback regarding neuropsychological evaluation results  Visit type: Virtual visit with audio only (telephone)  Total time spent with patient: 60 minutes  The reason for the audio only service rather than synchronous audio and video virtual visit was related to technical difficulties or patient preference/necessity.  Each patient to whom I provide medical services by telemedicine is: (1) informed of the relationship between the physician and patient and the respective role of any other health care provider with respect to management of the patient; and (2) notified that they may decline to receive medical services by telemedicine and may withdraw from such care at any time. Patient verbally consented to receive this service via voice-only telephone call.  HPI: See below  Assessment and plan:See below     Jorge [place]'s granddaughter attended a feedback session today (also her POA). We discussed the results of the neuropsychological evaluation and I gave time to discuss questions and concerns. Her granddaughter was able to provide me with additional information that I will be adding to the report as an addendum.     Johana Holcomb, PhD  Licensed Clinical Neuropsychologist  [facility] - Department of Neurology       This service was not originating from a related E/M service provided within the previous 7 days nor will  to an E/M service or procedure within the next 24 hours or my soonest available appointment. Prevailing standard of care was able to be met in this audio-only visit.

## 2020-07-24 ENCOUNTER — OFFICE VISIT (OUTPATIENT)
Dept: NEUROLOGY | Facility: CLINIC | Age: 80
End: 2020-07-24
Payer: MEDICARE

## 2020-07-24 ENCOUNTER — TELEPHONE (OUTPATIENT)
Dept: NEUROLOGY | Facility: CLINIC | Age: 80
End: 2020-07-24

## 2020-07-24 DIAGNOSIS — F43.22 ADJUSTMENT DISORDER WITH ANXIOUS MOOD: ICD-10-CM

## 2020-07-24 DIAGNOSIS — F32.0 MAJOR DEPRESSIVE DISORDER, SINGLE EPISODE, MILD: ICD-10-CM

## 2020-07-24 DIAGNOSIS — F03.91 MAJOR NEUROCOGNITIVE DISORDER DUE TO ALZHEIMER'S DISEASE, PROBABLE, WITH BEHAVIORAL DISTURBANCE: ICD-10-CM

## 2020-07-24 PROCEDURE — 99499 UNLISTED E&M SERVICE: CPT | Mod: 95,,, | Performed by: CLINICAL NEUROPSYCHOLOGIST

## 2020-07-24 PROCEDURE — 99499 NO LOS: ICD-10-PCS | Mod: 95,,, | Performed by: CLINICAL NEUROPSYCHOLOGIST

## 2020-07-24 NOTE — PROGRESS NOTES
NEUROPSYCHOLOGICAL EVALUATION FEEDBACK    Established Patient - Audio Only Telehealth Visit  The patient location is: home  The chief complaint leading to consultation is: feedback regarding neuropsychological evaluation results  Visit type: Virtual visit with audio only (telephone)  Total time spent with patient: 35 minutes  The reason for the audio only service rather than synchronous audio and video virtual visit was related to technical difficulties or patient preference/necessity.  Each patient to whom I provide medical services by telemedicine is: (1) informed of the relationship between the physician and patient and the respective role of any other health care provider with respect to management of the patient; and (2) notified that they may decline to receive medical services by telemedicine and may withdraw from such care at any time. Patient verbally consented to receive this service via voice-only telephone call.  HPI: see below.  Assessment and plan: see below.     Jorge Montgomery called back and we again discussed the results of testing. I gave time to discuss questions and concerns. She expressed her frustration that her son [place]. She became frustrated during our conversation and stated that sometimes she just wants to take her life. She denied intention or plan for suicide (provider re-assessed before hanging up phone).     Johana Holcomb, PhD  Licensed Clinical Neuropsychologist  [facility] - Department of Neurology     This service was not originating from a related E/M service provided within the previous 7 days nor will  to an E/M service or procedure within the next 24 hours or my soonest available appointment.  Prevailing standard of care was able to be met in this audio-only visit.

## 2020-07-31 ENCOUNTER — OFFICE VISIT (OUTPATIENT)
Dept: INTERNAL MEDICINE | Facility: CLINIC | Age: 80
End: 2020-07-31
Payer: MEDICARE

## 2020-07-31 ENCOUNTER — TELEPHONE (OUTPATIENT)
Dept: INTERNAL MEDICINE | Facility: CLINIC | Age: 80
End: 2020-07-31

## 2020-07-31 VITALS
HEIGHT: 64 IN | WEIGHT: 138.25 LBS | HEART RATE: 65 BPM | OXYGEN SATURATION: 97 % | DIASTOLIC BLOOD PRESSURE: 60 MMHG | SYSTOLIC BLOOD PRESSURE: 110 MMHG | BODY MASS INDEX: 23.6 KG/M2

## 2020-07-31 DIAGNOSIS — F43.21 GRIEF: ICD-10-CM

## 2020-07-31 DIAGNOSIS — E78.5 HYPERLIPIDEMIA, UNSPECIFIED HYPERLIPIDEMIA TYPE: ICD-10-CM

## 2020-07-31 DIAGNOSIS — C91.10 CHRONIC LYMPHOCYTIC LEUKEMIA: ICD-10-CM

## 2020-07-31 DIAGNOSIS — F03.91 MAJOR NEUROCOGNITIVE DISORDER DUE TO ALZHEIMER'S DISEASE, PROBABLE, WITH BEHAVIORAL DISTURBANCE: Primary | ICD-10-CM

## 2020-07-31 PROCEDURE — 99214 PR OFFICE/OUTPT VISIT, EST, LEVL IV, 30-39 MIN: ICD-10-PCS | Mod: S$GLB,,, | Performed by: INTERNAL MEDICINE

## 2020-07-31 PROCEDURE — 99499 RISK ADDL DX/OHS AUDIT: ICD-10-PCS | Mod: S$GLB,,, | Performed by: INTERNAL MEDICINE

## 2020-07-31 PROCEDURE — 1101F PT FALLS ASSESS-DOCD LE1/YR: CPT | Mod: CPTII,S$GLB,, | Performed by: INTERNAL MEDICINE

## 2020-07-31 PROCEDURE — 1159F MED LIST DOCD IN RCRD: CPT | Mod: S$GLB,,, | Performed by: INTERNAL MEDICINE

## 2020-07-31 PROCEDURE — 99499 UNLISTED E&M SERVICE: CPT | Mod: S$GLB,,, | Performed by: INTERNAL MEDICINE

## 2020-07-31 PROCEDURE — 1101F PR PT FALLS ASSESS DOC 0-1 FALLS W/OUT INJ PAST YR: ICD-10-PCS | Mod: CPTII,S$GLB,, | Performed by: INTERNAL MEDICINE

## 2020-07-31 PROCEDURE — 99999 PR PBB SHADOW E&M-EST. PATIENT-LVL III: ICD-10-PCS | Mod: PBBFAC,,, | Performed by: INTERNAL MEDICINE

## 2020-07-31 PROCEDURE — 99999 PR PBB SHADOW E&M-EST. PATIENT-LVL III: CPT | Mod: PBBFAC,,, | Performed by: INTERNAL MEDICINE

## 2020-07-31 PROCEDURE — 99214 OFFICE O/P EST MOD 30 MIN: CPT | Mod: S$GLB,,, | Performed by: INTERNAL MEDICINE

## 2020-07-31 PROCEDURE — 1159F PR MEDICATION LIST DOCUMENTED IN MEDICAL RECORD: ICD-10-PCS | Mod: S$GLB,,, | Performed by: INTERNAL MEDICINE

## 2020-07-31 RX ORDER — DONEPEZIL HYDROCHLORIDE 5 MG/1
5 TABLET, FILM COATED ORAL DAILY
Qty: 30 TABLET | Refills: 1 | Status: SHIPPED | OUTPATIENT
Start: 2020-07-31 | End: 2020-09-04

## 2020-07-31 NOTE — PROGRESS NOTES
CHIEF COMPLAINT: Follow up of grief, CLL, stress, reflux, allergies, dizziness    HISTORY OF PRESENT ILLNESS: This is a 79-year-old woman who presents for follow up of above.      Lori Clinton, her granddaughter is with her today.  Lori has a special limited medical power of  signed by Mrs Montgomery. This limited medical power of  gives Lori Carr and Patel [place] power over health related issues.     She had neuropsychological testing done 7/10/20 which revealed   Major neurocognitive disorder due to Alzheimer's disease, probable, with behavioral disturbance.      She states she has some memory loss - she is trying to be accepting of her memory loss.  She still wants to live in her home.  She reports that she is eating. She has lost weight - 10 pounds over the last several months.      Mrs [place] currently takes Wellbutrin  mg daily and celexa 40 mg daily for her grief and mood. She tried increasing the wellbutrin XL to 450 mg but she had hallucinations at this dose. She feels that she is doing a little better.  Her   2020 after 40 days in the ICU from a heart attack. Her  refused to go to the doctor when he was feeling bad until he had a massive heart attack. She is grieving his death. She feels that her anger is getting a little better       No abdominal pain.  Her interstitial cystitis is asx with diet  No dysuria or hematuria     Her WBC for her CLL has been stable. No fever, chills, night sweatts, weight loss. She saw H[facility] On[facility] 2019     She has not needed omeprazole daily.      She takes loratadine 10 mg daily for allergies.      No fever, chills, visual issues, hearing issues, chest pain, shortness of breath, nausea, vomiting, constipation, diarrhea, dysuria, hematuria, joint pain, muscle pain, rashes, headaches, insomnia.        PAST MEDICAL HISTORY:   1. Reflux.   2. Hyperlipidemia.   3. Interstitial cystitis   4. Chronic lymphocytic leukemia  "diagnosed . No need for treatment at this point in time.   5. Situational stress with anxiety and depression.   6. History of back pain in  - resolved.   7. History of liver biopsy  which revealed a hemangioma.     PAST SURGICAL HISTORY:   1. .   2. Hysterectomy.   3. Cholecystectomy.   4. Ganglion cyst removed from the left foot.     SOCIAL HISTORY: She is a past smoker, quit smoking 20 years ago; she   smoked a pack per week on and off for 5-10 years. No alcohol.    with two children. She has a daughter and a son.    MEDICATIONS and ALLERGIES: Updated on EPIC     PHYSICAL EXAMINATION:     /60   Pulse 65   Ht 5' 4" (1.626 m)   Wt 62.7 kg (138 lb 3.7 oz)   LMP  (LMP Unknown)   SpO2 97%   BMI 23.73 kg/m²     General: Alert, oriented. No apparent distress. Affect normal  Conjunctivae anicteric. Tympanic membranes clear   Neck supple. No cervical lymphadenopathy  Respiratory effort normal. Lungs clear to auscultation.   Heart: Regular rate and rhythm without murmurs, gallops or rubs.   No lower extremity edema.       ASSESSMENT AND PLAN:   1. Memory loss - consistent with dementia and Grief/anxiety and depression - long discussion.  Start donepezil 5 mg once daily, Discussed side effects.  May need additional medication for mood. May consider abilify 2.5 mg daily - she has had some paranoia behavior in the past.  Discussed need for driving assessment.  Discussed safety issues at home.   2. Interstitial cystitis - asx  3. Reflux - asx  4. CLL -stable  5. History of thyroid nodules - thyroid ultrasound stable   6. Hyperlipidemia -continue to work on diet, exercise and weight loss and flax seed oil. Does not want lipid lowering medications    7.  I'll see her back in 1 month, sooner if problems arise    "

## 2020-07-31 NOTE — PATIENT INSTRUCTIONS
Take donepezil 5 mg 1/2 tablet daily - if after 8 days, no nausea or diarrhea, then take whole tablet daily in the morning.

## 2020-07-31 NOTE — TELEPHONE ENCOUNTER
----- Message from Kamilla Reilly sent at 7/31/2020  2:56 PM CDT -----  Contact: Self   Pt states she does not want grand-daughter in upcoming dr visit due to pt would like to discuss with dr personal matters that involves grand-daughter. Please advise

## 2020-08-01 PROBLEM — F43.21 GRIEF: Status: ACTIVE | Noted: 2020-08-01

## 2020-08-04 ENCOUNTER — OFFICE VISIT (OUTPATIENT)
Dept: PSYCHIATRY | Facility: CLINIC | Age: 80
End: 2020-08-04
Payer: COMMERCIAL

## 2020-08-04 DIAGNOSIS — F41.9 ANXIETY: ICD-10-CM

## 2020-08-04 DIAGNOSIS — F32.A DEPRESSION, UNSPECIFIED DEPRESSION TYPE: Primary | ICD-10-CM

## 2020-08-04 PROCEDURE — 90834 PSYTX W PT 45 MINUTES: CPT | Mod: 95,,, | Performed by: PSYCHOLOGIST

## 2020-08-04 PROCEDURE — 90834 PR PSYCHOTHERAPY W/PATIENT, 45 MIN: ICD-10-PCS | Mod: 95,,, | Performed by: PSYCHOLOGIST

## 2020-08-06 NOTE — PROGRESS NOTES
The patient location is home  The chief complaint leading to consultation is: depression; anxiety  Visit type: audio only (pt unable to access internet    Face to Face time with pt 45 minutes   60  minutes of total time spent on the encounter, which includes face to face time and non-face to face time preparing to see the patient (eg, review of tests), Obtaining and/or reviewing separately obtained history, Documenting clinical information in the electronic or other health record, Independently interpreting results (not separately reported) and communicating results to the patient/family/caregiver, or Care coordination (not separately reported).         Each patient to whom he or she provides medical services by telemedicine is:  (1) informed of the relationship between the physician and patient and the respective role of any other health care provider with respect to management of the patient; and (2) notified that he or she may decline to receive medical services by telemedicine and may withdraw from such care at any time.      Individual Psychotherapy (PhD/LCSW)    8/4/2020    Site:  [facility]         Therapeutic Intervention: Met with patient.  Outpatient -Supportive psychotherapy 45 min - CPT code 16606    Chief complaint/reason for encounter: depression and anxiety     Interval history and content of current session: Pt discussed her distress over her son's insistence that she give up her home and move to the [facility] to live with son and wife. [place] get around on her own and would be far removed from her friends on the [facility]. Pt understands that her son and granddaughter have concerns for her safety but she feels she lives in a safe, stable neighborhood and can still care for herself. She notes that she has a 30 y.o neighbor who could look in on her and be available to help out if the need arose. She expresses frustration that she  lived her entire  life under the control of her spouse and now she is feeling pressured to give her autonomy once again. I encouraged pt to talk with her sons and granddaughter about her concerns and to offer the alternative of the services that could be provided by the neighbor. Pt is pessimistic that a compromise can be reached and expresses some resignation that she will have to give in in the long run. .       Treatment plan:  · Target symptoms: depression, anxiety   · Why chosen therapy is appropriate versus another modality: relevant to diagnosis, patient responds to this modality  · Outcome monitoring methods: self-report, observation, feedback from granddaughter and pcp  · Therapeutic intervention type: insight oriented psychotherapy    Risk parameters:  Patient reports no suicidal ideation  Patient reports no homicidal ideation  Patient reports no self-injurious behavior  Patient reports no violent behavior    Verbal deficits: None    Patient's response to intervention:  The patient's response to intervention is accepting.    Progress toward goals and other mental status changes:  The patient's progress toward goals is limited.    Diagnosis:  Depression, unspecified; Anxiety, unspecifed.     Plan:  individual psychotherapy    Return to clinic: as scheduled    Length of Service (minutes): 45

## 2020-08-17 ENCOUNTER — OFFICE VISIT (OUTPATIENT)
Dept: PSYCHIATRY | Facility: CLINIC | Age: 80
End: 2020-08-17
Payer: COMMERCIAL

## 2020-08-17 DIAGNOSIS — F32.A DEPRESSION, UNSPECIFIED DEPRESSION TYPE: Primary | ICD-10-CM

## 2020-08-17 DIAGNOSIS — F41.9 ANXIETY: ICD-10-CM

## 2020-08-17 PROCEDURE — 90834 PR PSYCHOTHERAPY W/PATIENT, 45 MIN: ICD-10-PCS | Mod: 95,,, | Performed by: PSYCHOLOGIST

## 2020-08-17 PROCEDURE — 90834 PSYTX W PT 45 MINUTES: CPT | Mod: 95,,, | Performed by: PSYCHOLOGIST

## 2020-08-17 NOTE — PROGRESS NOTES
The patient location is home  The chief complaint leading to consultation is: depression; anxiety  Visit type: audiovisual     Face to Face time with pt 45 minutes   60  minutes of total time spent on the encounter, which includes face to face time and non-face to face time preparing to see the patient (eg, review of tests), Obtaining and/or reviewing separately obtained history, Documenting clinical information in the electronic or other health record, Independently interpreting results (not separately reported) and communicating results to the patient/family/caregiver, or Care coordination (not separately reported).         Each patient to whom he or she provides medical services by telemedicine is:  (1) informed of the relationship between the physician and patient and the respective role of any other health care provider with respect to management of the patient; and (2) notified that he or she may decline to receive medical services by telemedicine and may withdraw from such care at any time.      Individual Psychotherapy (PhD/LCSW)    8/17/2020    Site:  [facility]         Therapeutic Intervention: Met with patient.  Outpatient -Supportive psychotherapy 45 min - CPT code 10306    Chief complaint/reason for encounter: depression and anxiety     Interval history and content of current session: Pt appeared significantly improved in mood and outlook today. She was smiling and at times laughing. She noted that she had been with her granddaughter the whole day and they had enjoyed a good time together. During the times she talked with me the pt went over the reasons why she doesn't want to move in with her son on the [facility] including: she likes making decisions for herself which her  never let her do; she has access to her friends on the [facility] and they wouldn't visit on the [facility]; she can do things for herself (eg shop) in places familiar to her and easy to get to ; she fears her  personality is so different from her daughter-in-law's personality that they would clash and she wouldn't feel comfortable in her household; etc. Pt stated that her son keeps pushing for her to move over there with him but he is not forcing her to do so. She said she wants to live on her own as long as possible and she would consider hiring help in order to allow her to do this. If necessary she believes she hs the funds to do this. She said she was feeling much more able to handle the things that overwhelmed her at first after her spouse had . At the end of the session I spoke with her granddaughter Darlene to arrange for another session and Darlene informed me that she had switched jobs and that part of her new responsibilities was take responsibility for the properties that the pt owns. This will allow Darlene to be more involved with the pt in a way that appears to be advantageous to both.     Treatment plan:  · Target symptoms: depression, anxiety   · Why chosen therapy is appropriate versus another modality: relevant to diagnosis, patient responds to this modality  · Outcome monitoring methods: self-report, observation, feedback from granddaughter and pcp  · Therapeutic intervention type: insight oriented psychotherapy    Risk parameters:  Patient reports no suicidal ideation  Patient reports no homicidal ideation  Patient reports no self-injurious behavior  Patient reports no violent behavior    Verbal deficits: None    Patient's response to intervention:  The patient's response to intervention is accepting.    Progress toward goals and other mental status changes:  The patient's progress toward goals is limited.    Diagnosis:  Depression, unspecified; Anxiety, unspecifed.     Plan:  individual psychotherapy    Return to clinic: as scheduled    Length of Service (minutes): 45

## 2020-08-24 ENCOUNTER — TELEPHONE (OUTPATIENT)
Dept: HEMATOLOGY/ONCOLOGY | Facility: CLINIC | Age: 80
End: 2020-08-24

## 2020-08-29 ENCOUNTER — OFFICE VISIT (OUTPATIENT)
Dept: INTERNAL MEDICINE | Facility: CLINIC | Age: 80
End: 2020-08-29
Payer: MEDICARE

## 2020-08-29 VITALS
DIASTOLIC BLOOD PRESSURE: 70 MMHG | HEART RATE: 75 BPM | OXYGEN SATURATION: 98 % | WEIGHT: 135.81 LBS | BODY MASS INDEX: 23.18 KG/M2 | HEIGHT: 64 IN | SYSTOLIC BLOOD PRESSURE: 110 MMHG

## 2020-08-29 DIAGNOSIS — F43.21 GRIEF: Primary | ICD-10-CM

## 2020-08-29 DIAGNOSIS — F03.90 DEMENTIA WITHOUT BEHAVIORAL DISTURBANCE, UNSPECIFIED DEMENTIA TYPE: ICD-10-CM

## 2020-08-29 DIAGNOSIS — C91.10 CLL (CHRONIC LYMPHOCYTIC LEUKEMIA): ICD-10-CM

## 2020-08-29 PROCEDURE — 1101F PT FALLS ASSESS-DOCD LE1/YR: CPT | Mod: CPTII,S$GLB,, | Performed by: INTERNAL MEDICINE

## 2020-08-29 PROCEDURE — 99999 PR PBB SHADOW E&M-EST. PATIENT-LVL II: CPT | Mod: PBBFAC,,, | Performed by: INTERNAL MEDICINE

## 2020-08-29 PROCEDURE — 99499 UNLISTED E&M SERVICE: CPT | Mod: S$GLB,,, | Performed by: INTERNAL MEDICINE

## 2020-08-29 PROCEDURE — 1101F PR PT FALLS ASSESS DOC 0-1 FALLS W/OUT INJ PAST YR: ICD-10-PCS | Mod: CPTII,S$GLB,, | Performed by: INTERNAL MEDICINE

## 2020-08-29 PROCEDURE — 99215 OFFICE O/P EST HI 40 MIN: CPT | Mod: S$GLB,,, | Performed by: INTERNAL MEDICINE

## 2020-08-29 PROCEDURE — 99999 PR PBB SHADOW E&M-EST. PATIENT-LVL II: ICD-10-PCS | Mod: PBBFAC,,, | Performed by: INTERNAL MEDICINE

## 2020-08-29 PROCEDURE — 1159F PR MEDICATION LIST DOCUMENTED IN MEDICAL RECORD: ICD-10-PCS | Mod: S$GLB,,, | Performed by: INTERNAL MEDICINE

## 2020-08-29 PROCEDURE — 99215 PR OFFICE/OUTPT VISIT, EST, LEVL V, 40-54 MIN: ICD-10-PCS | Mod: S$GLB,,, | Performed by: INTERNAL MEDICINE

## 2020-08-29 PROCEDURE — 1159F MED LIST DOCD IN RCRD: CPT | Mod: S$GLB,,, | Performed by: INTERNAL MEDICINE

## 2020-08-29 PROCEDURE — 99499 RISK ADDL DX/OHS AUDIT: ICD-10-PCS | Mod: S$GLB,,, | Performed by: INTERNAL MEDICINE
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
## 2020-08-29 NOTE — LETTER
August 29, 2020    Jorge Montgomery  [address] [facility] [address]             [facility]  [address]  [facility] [address]  Phone: [phone]  Fax: [phone] To Whom It May Concern:    Due to grief and memory loss, Mrs Montgomery cannot stay alone during the day for safety issues. She needs supervision during the day to ensure her safety.       Sincerely,        Amirah Flowers MD

## 2020-08-31 ENCOUNTER — TELEPHONE (OUTPATIENT)
Dept: INTERNAL MEDICINE | Facility: CLINIC | Age: 80
End: 2020-08-31

## 2020-08-31 ENCOUNTER — PATIENT MESSAGE (OUTPATIENT)
Dept: INTERNAL MEDICINE | Facility: CLINIC | Age: 80
End: 2020-08-31

## 2020-08-31 ENCOUNTER — HOSPITAL ENCOUNTER (EMERGENCY)
Facility: HOSPITAL | Age: 80
Discharge: PSYCHIATRIC OR SUBSTANCE USE FACILITY | End: 2020-08-31
Attending: EMERGENCY MEDICINE
Payer: MEDICARE

## 2020-08-31 VITALS
SYSTOLIC BLOOD PRESSURE: 129 MMHG | TEMPERATURE: 98 F | HEART RATE: 64 BPM | RESPIRATION RATE: 18 BRPM | OXYGEN SATURATION: 98 % | DIASTOLIC BLOOD PRESSURE: 59 MMHG | WEIGHT: 135 LBS | BODY MASS INDEX: 23.05 KG/M2 | HEIGHT: 64 IN

## 2020-08-31 DIAGNOSIS — D72.820 LYMPHOCYTOSIS: ICD-10-CM

## 2020-08-31 DIAGNOSIS — C91.10 CLL (CHRONIC LYMPHOCYTIC LEUKEMIA): ICD-10-CM

## 2020-08-31 DIAGNOSIS — F32.A DEPRESSION, UNSPECIFIED DEPRESSION TYPE: ICD-10-CM

## 2020-08-31 DIAGNOSIS — R45.851 SUICIDAL IDEATION: Primary | ICD-10-CM

## 2020-08-31 LAB
ALBUMIN SERPL BCP-MCNC: 4.2 G/DL (ref 3.5–5.2)
ALP SERPL-CCNC: 69 U/L (ref 55–135)
ALT SERPL W/O P-5'-P-CCNC: 18 U/L (ref 10–44)
AMPHET+METHAMPHET UR QL: NEGATIVE
ANION GAP SERPL CALC-SCNC: 9 MMOL/L (ref 8–16)
AST SERPL-CCNC: 24 U/L (ref 10–40)
BACTERIA #/AREA URNS AUTO: NORMAL /HPF
BARBITURATES UR QL SCN>200 NG/ML: NEGATIVE
BASOPHILS NFR BLD: 0 % (ref 0–1.9)
BENZODIAZ UR QL SCN>200 NG/ML: NEGATIVE
BILIRUB SERPL-MCNC: 0.5 MG/DL (ref 0.1–1)
BILIRUB UR QL STRIP: NEGATIVE
BUN SERPL-MCNC: 18 MG/DL (ref 8–23)
BZE UR QL SCN: NEGATIVE
CALCIUM SERPL-MCNC: 9.7 MG/DL (ref 8.7–10.5)
CANNABINOIDS UR QL SCN: NEGATIVE
CAOX CRY UR QL COMP ASSIST: NORMAL
CHLORIDE SERPL-SCNC: 103 MMOL/L (ref 95–110)
CLARITY UR REFRACT.AUTO: ABNORMAL
CO2 SERPL-SCNC: 29 MMOL/L (ref 23–29)
COLOR UR AUTO: YELLOW
CREAT SERPL-MCNC: 0.8 MG/DL (ref 0.5–1.4)
CREAT UR-MCNC: 181 MG/DL (ref 15–325)
DIFFERENTIAL METHOD: ABNORMAL
EOSINOPHIL NFR BLD: 1 % (ref 0–8)
ERYTHROCYTE [DISTWIDTH] IN BLOOD BY AUTOMATED COUNT: 13.2 % (ref 11.5–14.5)
EST. GFR  (AFRICAN AMERICAN): >60 ML/MIN/1.73 M^2
EST. GFR  (NON AFRICAN AMERICAN): >60 ML/MIN/1.73 M^2
ETHANOL SERPL-MCNC: <10 MG/DL
GLUCOSE SERPL-MCNC: 97 MG/DL (ref 70–110)
GLUCOSE UR QL STRIP: NEGATIVE
HCT VFR BLD AUTO: 46.5 % (ref 37–48.5)
HGB BLD-MCNC: 14.8 G/DL (ref 12–16)
HGB UR QL STRIP: ABNORMAL
IMM GRANULOCYTES # BLD AUTO: ABNORMAL K/UL (ref 0–0.04)
IMM GRANULOCYTES NFR BLD AUTO: ABNORMAL % (ref 0–0.5)
KETONES UR QL STRIP: NEGATIVE
LEUKOCYTE ESTERASE UR QL STRIP: ABNORMAL
LYMPHOCYTES NFR BLD: 86 % (ref 18–48)
MCH RBC QN AUTO: 29.6 PG (ref 27–31)
MCHC RBC AUTO-ENTMCNC: 31.8 G/DL (ref 32–36)
MCV RBC AUTO: 93 FL (ref 82–98)
METHADONE UR QL SCN>300 NG/ML: NEGATIVE
MICROSCOPIC COMMENT: NORMAL
MONOCYTES NFR BLD: 4 % (ref 4–15)
NEUTROPHILS NFR BLD: 9 % (ref 38–73)
NITRITE UR QL STRIP: NEGATIVE
NRBC BLD-RTO: 0 /100 WBC
OPIATES UR QL SCN: NEGATIVE
PCP UR QL SCN>25 NG/ML: NEGATIVE
PH UR STRIP: 5 [PH] (ref 5–8)
PLATELET # BLD AUTO: 246 K/UL (ref 150–350)
PLATELET BLD QL SMEAR: ABNORMAL
PMV BLD AUTO: 10.6 FL (ref 9.2–12.9)
POTASSIUM SERPL-SCNC: 4 MMOL/L (ref 3.5–5.1)
PROT SERPL-MCNC: 7.1 G/DL (ref 6–8.4)
PROT UR QL STRIP: NEGATIVE
RBC # BLD AUTO: 5 M/UL (ref 4–5.4)
RBC #/AREA URNS AUTO: 1 /HPF (ref 0–4)
SARS-COV-2 RDRP RESP QL NAA+PROBE: NEGATIVE
SMUDGE CELLS BLD QL SMEAR: PRESENT
SODIUM SERPL-SCNC: 141 MMOL/L (ref 136–145)
SP GR UR STRIP: 1.02 (ref 1–1.03)
SQUAMOUS #/AREA URNS AUTO: 3 /HPF
TOXICOLOGY INFORMATION: NORMAL
TSH SERPL DL<=0.005 MIU/L-ACNC: 1.29 UIU/ML (ref 0.4–4)
URN SPEC COLLECT METH UR: ABNORMAL
WBC # BLD AUTO: 35.46 K/UL (ref 3.9–12.7)
WBC #/AREA URNS AUTO: 5 /HPF (ref 0–5)

## 2020-08-31 PROCEDURE — 99285 EMERGENCY DEPT VISIT HI MDM: CPT | Mod: ,,, | Performed by: EMERGENCY MEDICINE

## 2020-08-31 PROCEDURE — U0002 COVID-19 LAB TEST NON-CDC: HCPCS

## 2020-08-31 PROCEDURE — 80307 DRUG TEST PRSMV CHEM ANLYZR: CPT

## 2020-08-31 PROCEDURE — 81001 URINALYSIS AUTO W/SCOPE: CPT

## 2020-08-31 PROCEDURE — 99285 EMERGENCY DEPT VISIT HI MDM: CPT

## 2020-08-31 PROCEDURE — 80320 DRUG SCREEN QUANTALCOHOLS: CPT

## 2020-08-31 PROCEDURE — 84443 ASSAY THYROID STIM HORMONE: CPT

## 2020-08-31 PROCEDURE — 85060 PATHOLOGIST REVIEW: ICD-10-PCS | Mod: ,,, | Performed by: PATHOLOGY

## 2020-08-31 PROCEDURE — 80053 COMPREHEN METABOLIC PANEL: CPT

## 2020-08-31 PROCEDURE — 85027 COMPLETE CBC AUTOMATED: CPT

## 2020-08-31 PROCEDURE — 85007 BL SMEAR W/DIFF WBC COUNT: CPT

## 2020-08-31 PROCEDURE — 85060 BLOOD SMEAR INTERPRETATION: CPT | Mod: ,,, | Performed by: PATHOLOGY

## 2020-08-31 PROCEDURE — 99285 PR EMERGENCY DEPT VISIT,LEVEL V: ICD-10-PCS | Mod: ,,, | Performed by: EMERGENCY MEDICINE
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
## 2020-08-31 NOTE — TELEPHONE ENCOUNTER
----- Message from Lev Julien sent at 8/31/2020  9:30 AM CDT -----  The patient's granddaughter Lori called while the ambulance was there to see if you could convince the patient to go to the ED with them. The ambulance representative told me that they can't make her go if she doesn't want to. I spoke to the patient to see if she wanted to speak with you and she agreed but, no one answered. I told the patient that you had the Charge staff physician, Fred in the ED waiting to assist her. She said she is not going to the ED but, she agreed for me to have you call her.    Thank you

## 2020-08-31 NOTE — ED NOTES
Report called to Anastasia at [facility] in [place]. [facility] has arranged for [facility] to transport pt to [facility].

## 2020-08-31 NOTE — TELEPHONE ENCOUNTER
Pt granddaughter would like PCP to call to discuss how the process with work once the pt is discharged in 3 days.
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
## 2020-08-31 NOTE — ASSESSMENT & PLAN NOTE
ASSESSMENT     Jorge Montgomery is a 79 y.o. female with a past psychiatric history of Alzheimer's, MDD currently presenting with behavioral disturbance and sucidal ideation.  Psychiatry was originally consulted to address the patient's symptoms of SI.     IMPRESSION  Patient is displaying signs of an advancing neurocognitive disorder that family is unable to safely address at this time. Given hx of continued SI and medication non-compliance, will recommend transfer to inpatient psychiatric unit with specialized geriatric care.     RECOMMENDATION(S)      1. Scheduled Medication(s):  Continue Celexa 40 mg daily  Continue Wellbutrin 450 mg daily    2. PRN Medication(s):  Avoid use of benzodiazepines for agitation. Use of antipsychotics are also contraindicated in dementia related psychosis unless absoluteley necessary. Would advise using PRN medications only as needed when other non-pharmaceutical interventions have been tried.     - If needed, give Seroquel 25 mg q8 PRN for non-redirectable agitation.     3.  Monitor:  Please obtain EKG to monitor QTc    4. Legal Status/Precaution(s):  Recommend PEC patient for grave disability, harm to self.

## 2020-08-31 NOTE — PROVIDER PROGRESS NOTES - EMERGENCY DEPT.
.Encounter Date: 8/31/2020    ED Physician Progress Notes         ED Physician Hand-off Note:    ED Course: I assumed care of patient from off-going ED physician team. Briefly, Patient is a 79 F sent from home for SI.  PEC formed filled out by Dr. Bassett.       Labs reviewed with leukocytosis- hx of CLL.  Baseline. Labs otherwise nl.  Pt medically cleared for psych assessment.     At the time of signout plan was pending psych evaluation and recommendation.      Notified by Psych that they are recommending  PEC after obtaining collateral information.    Medications given in the ED:    Medications - No data to display    Disposition: transfer to IP psych    Impression: SI, depression
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
## 2020-08-31 NOTE — ED NOTES
Pt's granddaughter Lori notified of the pt's pending transfer to [facility] in [place]. Lori was given the phone number and address to [facility].

## 2020-08-31 NOTE — ED NOTES
Pt transferred to  2, she is calm and cooperative. Pt checked for contraband and wearing paper scrubs. Pt belongings sent home with her granddaughter. PEC called into the coroners office. Pt is oriented to person and place but not time and situation. Pt has poor short term memory. Pt denies SI/HI/AVH's. She does endorse feelings of depression and grief r/t the death of her  in January. Pt does require reorienting but is capable of tending to all of her on ADL's. Pt minimizes the suicidal statements that she has made over the past few days. Pt instructed on the PEC process she verbalizes understanding. Pt lying in bed resting DVC maintained.
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
## 2020-08-31 NOTE — SUBJECTIVE & OBJECTIVE
Mental Status Exam   Orientation: Oriented to place, time, president  MSK: No abnormal movements   AIMS: Normal gait, No tics, tremors  Behavior: Calm, cooperative  Appearance: Well nourished, Well- appearing   Mood: Sad  Affect: Appear downtrodden, congruent to stated mood  Thought Content: Endorses SI with no plan. Endorses paranoia over children mishanfling her money  Thought Pattern: Organized, linea  Insight: Poor, no insight in Advanced Alzheimer's  Judgement: Poor as evidenced by punching wall, keying cars and walking off from home this am.

## 2020-09-01 ENCOUNTER — TELEPHONE (OUTPATIENT)
Dept: INTERNAL MEDICINE | Facility: CLINIC | Age: 80
End: 2020-09-01

## 2020-09-01 LAB — PATH REV BLD -IMP: NORMAL

## 2020-09-01 NOTE — ED NOTES
Patient transferred to [facility] by [facility] with only glasses. Report was called by previous shift. Vitals stable. DVC maintained. Patient  Lori Concepcion was called and informed of transfer to [facility].

## 2020-09-01 NOTE — TELEPHONE ENCOUNTER
----- Message from Rae Logan sent at 9/1/2020  4:03 PM CDT -----  Regarding: advise  Contact: Lori@ [phone]  Needs Advice    Reason for call:      Pt was taken to a psychiatric hospital on yesterday and she is receiving calls from people that are stressing her out. Also Lori would like to talk to the provider about getting nursing home information. Pt is at [facility].     Communication Preference:Lori@ [phone]    Additional Information:  Please call to advise.

## 2020-09-02 ENCOUNTER — TELEPHONE (OUTPATIENT)
Dept: HEMATOLOGY/ONCOLOGY | Facility: CLINIC | Age: 80
End: 2020-09-02

## 2020-09-02 ENCOUNTER — TELEPHONE (OUTPATIENT)
Dept: NEUROLOGY | Facility: CLINIC | Age: 80
End: 2020-09-02
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
## 2020-09-02 NOTE — TELEPHONE ENCOUNTER
Returned call to Delia with [facility].   Pt was admitted on 9/1 for depression r/t  death (had suicidal ideations on 8/31).   Delia calling to see if pt currently on any medications for her CLL- to which nurse informed she is stable off of therapy.   Delia asked when pt's next f/u is scheduled- informed that pt missed f/u on 8/25 (was last seen in April) and assisted with r/s appt to 9/16.   Delia verbalized understanding and thanked nurse.    Message fwd.          ----- Message from Hortensia Peck sent at 9/2/2020  2:38 PM CDT -----  Regarding: Diagnosis  Contact: [facility]  Reason: Nurse calling to ask if patient have leukemia? (mutual pt)Pt was admitted on the first    Communication: [phone] Angela ()

## 2020-09-03 ENCOUNTER — TELEPHONE (OUTPATIENT)
Dept: INTERNAL MEDICINE | Facility: CLINIC | Age: 80
End: 2020-09-03

## 2020-09-03 NOTE — TELEPHONE ENCOUNTER
----- Message from Nohemy Russ sent at 9/3/2020 12:56 PM CDT -----  Contact: Lori/Grand Child /[phone]  Lori called in regards needing to talk with Dr Flowers about patient was sent to [facility], and was suppose to be there for the past 3 days, she is suppose to be out by today, but no one has contact Lori to informed her of the release. Lori stated that she contacted the facility but no one is answering, and if is possible for patient pcp to f/u with [facility].  Please call and advise. Thank you.

## 2020-09-04 ENCOUNTER — OFFICE VISIT (OUTPATIENT)
Dept: NEUROLOGY | Facility: CLINIC | Age: 80
End: 2020-09-04
Payer: MEDICARE

## 2020-09-04 DIAGNOSIS — F03.91 MAJOR NEUROCOGNITIVE DISORDER DUE TO ALZHEIMER'S DISEASE, PROBABLE, WITH BEHAVIORAL DISTURBANCE: Primary | ICD-10-CM

## 2020-09-04 PROCEDURE — 99499 UNLISTED E&M SERVICE: CPT | Mod: 95,,, | Performed by: CLINICAL NEUROPSYCHOLOGIST

## 2020-09-04 PROCEDURE — 98968 PH1 ASSMT&MGMT NQHP 21-30: CPT | Mod: 95,,, | Performed by: CLINICAL NEUROPSYCHOLOGIST

## 2020-09-04 PROCEDURE — 99499 NO LOS: ICD-10-PCS | Mod: 95,,, | Performed by: CLINICAL NEUROPSYCHOLOGIST

## 2020-09-04 PROCEDURE — 98968 PR NONPHYSICIAN TELEPHONE ASSESSMENT 21-30 MIN: ICD-10-PCS | Mod: 95,,, | Performed by: CLINICAL NEUROPSYCHOLOGIST

## 2020-09-04 RX ORDER — DONEPEZIL HYDROCHLORIDE 5 MG/1
TABLET, FILM COATED ORAL
Qty: 30 TABLET | Refills: 1 | Status: SHIPPED | OUTPATIENT
Start: 2020-09-04 | End: 2021-03-19

## 2020-09-04 NOTE — PROGRESS NOTES
Refill Routing Note   Medication(s) are not appropriate for processing by [facility]:       - Outside of protocol           Medication reconciliation completed: No      Automatic Epic Generated Protocol Data:        Requested Prescriptions   Pending Prescriptions Disp Refills    donepeziL (ARICEPT) 5 MG tablet [Pharmacy Med Name: DONEPEZIL HCL 5 MG TABLET] 30 tablet 1     Sig: TAKE 1 TABLET BY MOUTH EVERY DAY       There is no refill protocol information for this order           Appointments  past 12m or future 3m with PCP    Date Provider   Last Visit   8/29/2020 Amirah Flowers MD   Next Visit   9/28/2020 Amirah Flowers MD   ED visits in past 90 days: 1     Note composed:1:59 PM 09/04/2020

## 2020-09-07 NOTE — ASSESSMENT & PLAN NOTE
Imminent Risk & Safety Planning - The family has removed all means for self-harm from the home. They remain concerned that the patient has a pool and does not know how to swim. They also remain concerned for her ability to care for herself if she is discharged from the hospital back to her own home. Safety planning was discussed, but given that they do not have decision-making capacity for the patient, legal system involvement is required at this time.     Further evidence of functional decline - While in the patient's home, her granddaughter learned that the patient has not been taking her medications correctly (saw when prescriptions were filled and counted doses in the bottle).     Competency Evaluation Needed - I believe there is evidence to suggest that this patient is not making rational decisions at this time (threatening self harm, damaging property, becoming physically aggressive with police officers). However, given the interactive complexity of this case and that attorneys have been retained by both the patient and her family, I believe resolution requires involvement of the legal system. The patient's family was advised that capacity/competency decisions are needed. They were encouraged to discuss filing for interdiction with their .     Social Work - Will have social work reach out as a source of support to the patient and family.     Behavioral intervention strategies were discussed during the current appointment.      Family encouraged to reach out if they feel I can be of further assistance.

## 2020-09-07 NOTE — PROGRESS NOTES
NEUROPSYCHOLOGICAL FOLLOW-UP VISIT - CONFIDENTIAL       Referring Provider: Amirah Flowers MD   Medical Necessity: Supportive therapy, treatment planning, and treatment management in the setting of Alzheimer's dementia with memory loss  Date Conducted:  2020  Present At Visit: The patient's son, daughter-in-law, and granddaughter  Billin = 90 minutes   Referral Diagnosis: Memory loss    Telemedicine Details: Established Patient - Audio Only Telehealth Visit  The patient location is: [facility]  The chief complaint leading to consultation is: dementia with behavioral disturbance   Visit type: Virtual visit with audio only (telephone)  Total time spent with patient: 90 minutes  The reason for the audio only service rather than synchronous audio and video virtual visit was related to technical difficulties or patient preference/necessity.  Each patient to whom I provide medical services by telemedicine is: (1) informed of the relationship between the physician and patient and the respective role of any other health care provider with respect to management of the patient; and (2) notified that they may decline to receive medical services by telemedicine and may withdraw from such care at any time. Patient verbally consented to receive this service via voice-only telephone call.  HPI: See below  Assessment and plan: See below    ASSESSMENT & PLAN:     Ms. Jorge Montgomery is an 79 y.o., female with 12 years of education who was diagnosed with Major Neurocognitive Disorder/dementia due to Alzheimer's disease with behavioral disturbance following completion of her neuropsychological evaluation in 2020 (please see note by this provider dated 7/10/2020 for full details). Her family requested this emergent meeting given recent worsening of the patient's behavior and threats of suicide.    Problem List Items Addressed This Visit        Neuro    Major neurocognitive disorder due to  Alzheimer's disease, probable, with behavioral disturbance - Primary    Overview     The patient denied experiencing any changes in cognition other than age-related changes. She explained that her son has voiced his concerns and thus, she is following through with the necessary steps to measure/check her cognitive functioning.     Independent and without difficulty in most IADLs per patient's report. According to her granddaughter and PCP, she has been making errors with appointment management (including showing on the wrong days and missing appointments altogether) and has forgotten that she recently completed and signed a second POA  (despite having an active POA with different individuals named). She has also lost several credit cards recently. Her son manages her finances and her granddaughter tries to provide additional oversight with appointments.     Neuropsychological evaluation from July 2020 revealed significant deficits in the patient's learning and memory across four different measures. She displayed a flattened learning curve and freely recall 1 unit of information across all four measures. She sometimes benefited from recognition cueing, while other times she did not. These scores are very concerning for an emerging Alzheimer's dementia and deserve monitoring over time (she was oriented x 4 with the exception of stating the incorrect day of the month). While the patient reported intact IADL management, conversations with her granddaughter and PCP indicate functional decline and thus, a diagnosis of Major Neurocognitive Disorder is appropriate at this time, albeit she appears to be in a mild stage.            Current Assessment & Plan     Imminent Risk & Safety Planning - The family has removed all means for self-harm from the home. They remain concerned that the patient has a pool and does not know how to swim. They also remain concerned for her ability to care for herself if she is discharged from the  hospital back to her own home. Safety planning was discussed, but given that they do not have decision-making capacity for the patient, legal system involvement is required at this time.     Further evidence of functional decline - While in the patient's home, her granddaughter learned that the patient has not been taking her medications correctly (saw when prescriptions were filled and counted doses in the bottle).     Competency Evaluation Needed - I believe there is evidence to suggest that this patient is not making rational decisions at this time (threatening self harm, damaging property, becoming physically aggressive with police officers). However, given the interactive complexity of this case and that attorneys have been retained by both the patient and her family, I believe resolution requires involvement of the legal system. The patient's family was advised that capacity/competency decisions are needed. They were encouraged to discuss filing for interdiction with their .     Social Work - Will have social work reach out as a source of support to the patient and family.     Behavioral intervention strategies were discussed during the current appointment.      Family encouraged to reach out if they feel I can be of further assistance.            Thank you for allowing me to assist in Ms. Montgomery's care. If you have any questions, please contact me at [phone].      Johana Holcomb, PhD  Licensed Clinical Neuropsychologist  [facility] - Department of Neurology    SUBJECTIVE:     Caregiver(s) Report: The patient's son explained that since the patient completed her neuropsychological evaluation, they have been discussing the patient's living arrangements in more detail. Her son sold his old house and is currently under contract for a new house with a mother-in-law suite attached to it. He stated that the patient initially agreed to move into this home, however, over the past two  weeks, she has been stating that she will not move and has started threatening to kill herself if anyone tries to force her to move. He explained that the police have been called approximately five time over the past two weeks in order to try to keep the patient safe. The patient began acting out physically, keying her granddaughter's and daughter-in-law's cars, and attempting to flee her house if any family comes near her.     She threatened to kill herself again the other day and after discussing this with the patient's doctors, the family decided to call an ambulance to have her taken to the hospital. The patient refused to get into the ambulance, so the police were again called. The patient became aggressive with police, and was taken to the ED and then taken to [facility], which is where she is currently located. While in the behavioral health hospital, the patient called an old . The patient's son and granddaughter in the meantime have retained their own .     At this point, the patient's son and granddaughter are concerned for the patient's safety, particularly if she threatens to harm herself any time they try to help her. The patient has a neighbor who has become involved and is actively helping the patient fight against her family. Her family members expressed their concern that the patient should not be left alone at this time, and if their involvement makes things worse, they are unsure of what the next steps should be to ensure that she is adequately cared for by someone else.     OBJECTIVE:     N/A as patient was not present at this appointment.      This service was not originating from a related E/M service provided within the previous 7 days nor will  to an E/M service or procedure within the next 24 hours or my soonest available appointment.  Prevailing standard of care was able to be met in this audio-only visit.

## 2020-09-10 ENCOUNTER — TELEPHONE (OUTPATIENT)
Dept: INTERNAL MEDICINE | Facility: CLINIC | Age: 80
End: 2020-09-10

## 2020-09-10 NOTE — TELEPHONE ENCOUNTER
----- Message from Nicolette Najera sent at 9/10/2020  9:29 AM CDT -----  Regarding: Ms. Daniels  @ [facility] #[phone] or Mobile [phone]  Patient would like to get a referral.  Referral to what specialty:  Psychiatry  Does the patient want the referral with a specific physician:  N/A  Is the specialist an [facility]:  N/A  Reason (be specific):  Patient is discharged from [facility] and Ms. Isaacs would like for her to see a psychiatrist due to her discharge.   Does the patient already have the specialty clinic appointment scheduled:  N/A  If yes, what date is the appointment scheduled:   N/A  Is the insurance listed in Epic correct? (this is important for a referral):  Yes

## 2020-09-10 NOTE — TELEPHONE ENCOUNTER
You  Tae Pal MA 5 hours ago (11:10 AM)     Please notify  that she does not have an established psychiatrist.  will need to get her an apt with a psychiatrist- either at [facility] or at [facility]. Call [phone] to schedule at [facility]    Message text

## 2020-09-10 NOTE — TELEPHONE ENCOUNTER
Spoke with Angela  at [facility]. Pt no longer has family as power of , she now has her friend Usha has power of . Rama can't make pt go to psych appts however she will make her an appt with a Behavioral Health Clinic.

## 2020-09-11 RX ORDER — DONEPEZIL HYDROCHLORIDE 5 MG/1
TABLET, FILM COATED ORAL
Qty: 90 TABLET | Refills: 1 | OUTPATIENT
Start: 2020-09-11

## 2020-09-11 NOTE — PROGRESS NOTES
Refill Routing Note   Medication(s) are not appropriate for processing by [facility]:       - Outside of protocol           Medication reconciliation completed: No      Automatic Epic Generated Protocol Data:        Requested Prescriptions   Pending Prescriptions Disp Refills    donepeziL (ARICEPT) 5 MG tablet [Pharmacy Med Name: DONEPEZIL HCL 5 MG TABLET] 90 tablet 1     Sig: TAKE 1 TABLET BY MOUTH EVERY DAY       There is no refill protocol information for this order           Appointments  past 12m or future 3m with PCP    Date Provider   Last Visit   8/29/2020 Amirah Flowers MD   Next Visit   9/25/2020 Amirah Flowers MD   ED visits in past 90 days: 1     Note composed:2:01 PM 09/11/2020

## 2020-09-24 ENCOUNTER — TELEPHONE (OUTPATIENT)
Dept: INTERNAL MEDICINE | Facility: CLINIC | Age: 80
End: 2020-09-24

## 2020-09-24 NOTE — TELEPHONE ENCOUNTER
----- Message from Amirah Flowers MD sent at 9/23/2020  8:10 PM CDT -----  Pt has an apt to see me on Friday 9/25/20 at 11 am (hospital follow up) and on Monday 9/28/20 at 1:30.    Please try to contact patient to see which apt she is coming to.  Try to speak with a caregiver as well    Thanks

## 2020-09-28 ENCOUNTER — PATIENT MESSAGE (OUTPATIENT)
Dept: HEMATOLOGY/ONCOLOGY | Facility: CLINIC | Age: 80
End: 2020-09-28

## 2020-09-28 ENCOUNTER — PATIENT MESSAGE (OUTPATIENT)
Dept: INTERNAL MEDICINE | Facility: CLINIC | Age: 80
End: 2020-09-28

## 2020-10-07 ENCOUNTER — TELEPHONE (OUTPATIENT)
Dept: PSYCHIATRY | Facility: CLINIC | Age: 80
End: 2020-10-07

## 2020-10-07 NOTE — TELEPHONE ENCOUNTER
Pt was called during scheduled appointment as she did not show. Pt was unable to be reached on either phone number in chart. Provider then called pt's graddaughter Lori/Grand Child ([phone]), however there as no answer.    Pt's pharmacy was called to confirm whether prescriptions had been filled. No prescriptions have been filled at the [facility] in [facility] since July 2020. It also appears pt has missed all follow-p appointments with providers since being discharged from [facility] in September 2020.     Ldaan Abel MD  [employer] [facility]  PGY-4

## 2020-10-26 ENCOUNTER — PATIENT MESSAGE (OUTPATIENT)
Dept: INTERNAL MEDICINE | Facility: CLINIC | Age: 80
End: 2020-10-26

## 2020-11-07 ENCOUNTER — PATIENT MESSAGE (OUTPATIENT)
Dept: NEUROLOGY | Facility: CLINIC | Age: 80
End: 2020-11-07

## 2020-11-19 ENCOUNTER — PATIENT OUTREACH (OUTPATIENT)
Dept: ADMINISTRATIVE | Facility: OTHER | Age: 80
End: 2020-11-19

## 2020-11-19 NOTE — PROGRESS NOTES
Care Everywhere:   Immunization: updated, links delay   Health Maintenance: updated  Media Review:   Legacy Review:   Order placed:   Upcoming appts:

## 2021-01-13 ENCOUNTER — OFFICE VISIT (OUTPATIENT)
Dept: PSYCHIATRY | Facility: CLINIC | Age: 81
End: 2021-01-13
Payer: MEDICARE

## 2021-01-13 DIAGNOSIS — F43.22 ADJUSTMENT DISORDER WITH ANXIOUS MOOD: Primary | ICD-10-CM

## 2021-01-13 PROCEDURE — 90834 PSYTX W PT 45 MINUTES: CPT | Mod: 95,,, | Performed by: PSYCHOLOGIST

## 2021-01-13 PROCEDURE — 90834 PR PSYCHOTHERAPY W/PATIENT, 45 MIN: ICD-10-PCS | Mod: 95,,, | Performed by: PSYCHOLOGIST

## 2021-01-26 ENCOUNTER — OFFICE VISIT (OUTPATIENT)
Dept: PSYCHIATRY | Facility: CLINIC | Age: 81
End: 2021-01-26
Payer: COMMERCIAL

## 2021-01-26 DIAGNOSIS — F43.22 ADJUSTMENT DISORDER WITH ANXIOUS MOOD: Primary | ICD-10-CM

## 2021-01-26 PROCEDURE — 90834 PSYTX W PT 45 MINUTES: CPT | Mod: 95,,, | Performed by: PSYCHOLOGIST

## 2021-01-26 PROCEDURE — 90834 PR PSYCHOTHERAPY W/PATIENT, 45 MIN: ICD-10-PCS | Mod: 95,,, | Performed by: PSYCHOLOGIST

## 2021-02-15 ENCOUNTER — OFFICE VISIT (OUTPATIENT)
Dept: PSYCHIATRY | Facility: CLINIC | Age: 81
End: 2021-02-15
Payer: COMMERCIAL

## 2021-02-15 DIAGNOSIS — F43.22 ADJUSTMENT DISORDER WITH ANXIOUS MOOD: Primary | ICD-10-CM

## 2021-02-15 PROCEDURE — 90834 PR PSYCHOTHERAPY W/PATIENT, 45 MIN: ICD-10-PCS | Mod: 95,,, | Performed by: PSYCHOLOGIST

## 2021-02-15 PROCEDURE — 90834 PSYTX W PT 45 MINUTES: CPT | Mod: 95,,, | Performed by: PSYCHOLOGIST

## 2021-03-05 ENCOUNTER — OFFICE VISIT (OUTPATIENT)
Dept: INTERNAL MEDICINE | Facility: CLINIC | Age: 81
End: 2021-03-05
Payer: MEDICARE

## 2021-03-05 ENCOUNTER — TELEPHONE (OUTPATIENT)
Dept: INTERNAL MEDICINE | Facility: CLINIC | Age: 81
End: 2021-03-05

## 2021-03-05 VITALS
DIASTOLIC BLOOD PRESSURE: 82 MMHG | HEART RATE: 78 BPM | HEIGHT: 64 IN | OXYGEN SATURATION: 98 % | BODY MASS INDEX: 24.1 KG/M2 | WEIGHT: 141.13 LBS | SYSTOLIC BLOOD PRESSURE: 140 MMHG

## 2021-03-05 DIAGNOSIS — R10.32 BILATERAL GROIN PAIN: Primary | ICD-10-CM

## 2021-03-05 DIAGNOSIS — R10.31 BILATERAL GROIN PAIN: Primary | ICD-10-CM

## 2021-03-05 PROCEDURE — 1159F PR MEDICATION LIST DOCUMENTED IN MEDICAL RECORD: ICD-10-PCS | Mod: S$GLB,,, | Performed by: INTERNAL MEDICINE

## 2021-03-05 PROCEDURE — 99214 OFFICE O/P EST MOD 30 MIN: CPT | Mod: S$GLB,,, | Performed by: INTERNAL MEDICINE

## 2021-03-05 PROCEDURE — 3288F FALL RISK ASSESSMENT DOCD: CPT | Mod: CPTII,S$GLB,, | Performed by: INTERNAL MEDICINE

## 2021-03-05 PROCEDURE — 99214 PR OFFICE/OUTPT VISIT, EST, LEVL IV, 30-39 MIN: ICD-10-PCS | Mod: S$GLB,,, | Performed by: INTERNAL MEDICINE

## 2021-03-05 PROCEDURE — 99999 PR PBB SHADOW E&M-EST. PATIENT-LVL IV: CPT | Mod: PBBFAC,,, | Performed by: INTERNAL MEDICINE

## 2021-03-05 PROCEDURE — 1159F MED LIST DOCD IN RCRD: CPT | Mod: S$GLB,,, | Performed by: INTERNAL MEDICINE

## 2021-03-05 PROCEDURE — 99999 PR PBB SHADOW E&M-EST. PATIENT-LVL IV: ICD-10-PCS | Mod: PBBFAC,,, | Performed by: INTERNAL MEDICINE

## 2021-03-05 PROCEDURE — 1125F AMNT PAIN NOTED PAIN PRSNT: CPT | Mod: S$GLB,,, | Performed by: INTERNAL MEDICINE

## 2021-03-05 PROCEDURE — 1125F PR PAIN SEVERITY QUANTIFIED, PAIN PRESENT: ICD-10-PCS | Mod: S$GLB,,, | Performed by: INTERNAL MEDICINE

## 2021-03-05 PROCEDURE — 1101F PR PT FALLS ASSESS DOC 0-1 FALLS W/OUT INJ PAST YR: ICD-10-PCS | Mod: CPTII,S$GLB,, | Performed by: INTERNAL MEDICINE

## 2021-03-05 PROCEDURE — 1101F PT FALLS ASSESS-DOCD LE1/YR: CPT | Mod: CPTII,S$GLB,, | Performed by: INTERNAL MEDICINE

## 2021-03-05 PROCEDURE — 3288F PR FALLS RISK ASSESSMENT DOCUMENTED: ICD-10-PCS | Mod: CPTII,S$GLB,, | Performed by: INTERNAL MEDICINE

## 2021-03-06 ENCOUNTER — IMMUNIZATION (OUTPATIENT)
Dept: PRIMARY CARE CLINIC | Facility: CLINIC | Age: 81
End: 2021-03-06
Payer: MEDICARE

## 2021-03-06 DIAGNOSIS — Z23 NEED FOR VACCINATION: Primary | ICD-10-CM

## 2021-03-06 PROCEDURE — 91300 PR SARS-COV- 2 COVID-19 VACCINE, NO PRSV, 30MCG/0.3ML, IM: CPT | Mod: S$GLB,,, | Performed by: INTERNAL MEDICINE

## 2021-03-06 PROCEDURE — 91300 PR SARS-COV- 2 COVID-19 VACCINE, NO PRSV, 30MCG/0.3ML, IM: ICD-10-PCS | Mod: S$GLB,,, | Performed by: INTERNAL MEDICINE

## 2021-03-06 PROCEDURE — 0001A PR IMMUNIZ ADMIN, SARS-COV-2 COVID-19 VACC, 30MCG/0.3ML, 1ST DOSE: CPT | Mod: CV19,S$GLB,, | Performed by: INTERNAL MEDICINE

## 2021-03-06 PROCEDURE — 0001A PR IMMUNIZ ADMIN, SARS-COV-2 COVID-19 VACC, 30MCG/0.3ML, 1ST DOSE: ICD-10-PCS | Mod: CV19,S$GLB,, | Performed by: INTERNAL MEDICINE

## 2021-03-06 RX ADMIN — Medication 0.3 ML: at 11:03

## 2021-03-18 ENCOUNTER — HOSPITAL ENCOUNTER (OUTPATIENT)
Dept: RADIOLOGY | Facility: HOSPITAL | Age: 81
Discharge: HOME | End: 2021-03-18
Attending: INTERNAL MEDICINE
Payer: MEDICARE

## 2021-03-18 DIAGNOSIS — R10.32 BILATERAL GROIN PAIN: ICD-10-CM

## 2021-03-18 DIAGNOSIS — R10.31 BILATERAL GROIN PAIN: ICD-10-CM

## 2021-03-18 PROCEDURE — 76705 ECHO EXAM OF ABDOMEN: CPT | Mod: TC

## 2021-03-18 PROCEDURE — 76705 ECHO EXAM OF ABDOMEN: CPT | Mod: 26,,, | Performed by: RADIOLOGY

## 2021-03-18 PROCEDURE — 76705 US ABDOMEN LIMITED_HERNIA: ICD-10-PCS | Mod: 26,,, | Performed by: RADIOLOGY

## 2021-03-19 ENCOUNTER — LAB VISIT (OUTPATIENT)
Dept: LAB | Facility: HOSPITAL | Age: 81
End: 2021-03-19
Attending: INTERNAL MEDICINE
Payer: MEDICARE

## 2021-03-19 ENCOUNTER — OFFICE VISIT (OUTPATIENT)
Dept: INTERNAL MEDICINE | Facility: CLINIC | Age: 81
End: 2021-03-19
Payer: MEDICARE

## 2021-03-19 VITALS
OXYGEN SATURATION: 70 % | SYSTOLIC BLOOD PRESSURE: 108 MMHG | WEIGHT: 141.13 LBS | HEIGHT: 64 IN | BODY MASS INDEX: 24.1 KG/M2 | HEART RATE: 52 BPM | DIASTOLIC BLOOD PRESSURE: 62 MMHG

## 2021-03-19 DIAGNOSIS — R53.83 FATIGUE, UNSPECIFIED TYPE: ICD-10-CM

## 2021-03-19 DIAGNOSIS — C91.10 CLL (CHRONIC LYMPHOCYTIC LEUKEMIA): ICD-10-CM

## 2021-03-19 DIAGNOSIS — R10.9 ABDOMINAL PAIN, UNSPECIFIED ABDOMINAL LOCATION: Primary | ICD-10-CM

## 2021-03-19 DIAGNOSIS — F43.21 GRIEF: ICD-10-CM

## 2021-03-19 DIAGNOSIS — R41.3 MEMORY LOSS: ICD-10-CM

## 2021-03-19 DIAGNOSIS — F03.91 MAJOR NEUROCOGNITIVE DISORDER DUE TO ALZHEIMER'S DISEASE, PROBABLE, WITH BEHAVIORAL DISTURBANCE: ICD-10-CM

## 2021-03-19 LAB
ALBUMIN SERPL BCP-MCNC: 3.8 G/DL (ref 3.5–5.2)
ALP SERPL-CCNC: 74 U/L (ref 55–135)
ALT SERPL W/O P-5'-P-CCNC: 18 U/L (ref 10–44)
AMORPH CRY UR QL COMP ASSIST: ABNORMAL
ANION GAP SERPL CALC-SCNC: 5 MMOL/L (ref 8–16)
AST SERPL-CCNC: 23 U/L (ref 10–40)
BACTERIA #/AREA URNS AUTO: ABNORMAL /HPF
BASOPHILS # BLD AUTO: ABNORMAL K/UL (ref 0–0.2)
BASOPHILS NFR BLD: 0 % (ref 0–1.9)
BILIRUB SERPL-MCNC: 0.4 MG/DL (ref 0.1–1)
BILIRUB UR QL STRIP: NEGATIVE
BUN SERPL-MCNC: 15 MG/DL (ref 8–23)
CALCIUM SERPL-MCNC: 9.6 MG/DL (ref 8.7–10.5)
CHLORIDE SERPL-SCNC: 105 MMOL/L (ref 95–110)
CLARITY UR REFRACT.AUTO: ABNORMAL
CO2 SERPL-SCNC: 32 MMOL/L (ref 23–29)
COLOR UR AUTO: YELLOW
CREAT SERPL-MCNC: 0.8 MG/DL (ref 0.5–1.4)
DIFFERENTIAL METHOD: ABNORMAL
EOSINOPHIL # BLD AUTO: ABNORMAL K/UL (ref 0–0.5)
EOSINOPHIL NFR BLD: 0 % (ref 0–8)
ERYTHROCYTE [DISTWIDTH] IN BLOOD BY AUTOMATED COUNT: 13.2 % (ref 11.5–14.5)
EST. GFR  (AFRICAN AMERICAN): >60 ML/MIN/1.73 M^2
EST. GFR  (NON AFRICAN AMERICAN): >60 ML/MIN/1.73 M^2
GLUCOSE SERPL-MCNC: 162 MG/DL (ref 70–110)
GLUCOSE UR QL STRIP: ABNORMAL
HCT VFR BLD AUTO: 42.7 % (ref 37–48.5)
HGB BLD-MCNC: 13.9 G/DL (ref 12–16)
HGB UR QL STRIP: ABNORMAL
IMM GRANULOCYTES # BLD AUTO: ABNORMAL K/UL (ref 0–0.04)
IMM GRANULOCYTES NFR BLD AUTO: ABNORMAL % (ref 0–0.5)
KETONES UR QL STRIP: NEGATIVE
LEUKOCYTE ESTERASE UR QL STRIP: ABNORMAL
LYMPHOCYTES # BLD AUTO: ABNORMAL K/UL (ref 1–4.8)
LYMPHOCYTES NFR BLD: 76 % (ref 18–48)
MCH RBC QN AUTO: 29.6 PG (ref 27–31)
MCHC RBC AUTO-ENTMCNC: 32.6 G/DL (ref 32–36)
MCV RBC AUTO: 91 FL (ref 82–98)
MICROSCOPIC COMMENT: ABNORMAL
MONOCYTES # BLD AUTO: ABNORMAL K/UL (ref 0.3–1)
MONOCYTES NFR BLD: 3 % (ref 4–15)
NEUTROPHILS NFR BLD: 21 % (ref 38–73)
NITRITE UR QL STRIP: NEGATIVE
NRBC BLD-RTO: 0 /100 WBC
PH UR STRIP: 5 [PH] (ref 5–8)
PLATELET # BLD AUTO: 275 K/UL (ref 150–350)
PLATELET BLD QL SMEAR: ABNORMAL
PMV BLD AUTO: 10.2 FL (ref 9.2–12.9)
POTASSIUM SERPL-SCNC: 3.7 MMOL/L (ref 3.5–5.1)
PROT SERPL-MCNC: 6.8 G/DL (ref 6–8.4)
PROT UR QL STRIP: NEGATIVE
RBC # BLD AUTO: 4.7 M/UL (ref 4–5.4)
RBC #/AREA URNS AUTO: 0 /HPF (ref 0–4)
SODIUM SERPL-SCNC: 142 MMOL/L (ref 136–145)
SP GR UR STRIP: 1.03 (ref 1–1.03)
SQUAMOUS #/AREA URNS AUTO: 1 /HPF
TSH SERPL DL<=0.005 MIU/L-ACNC: 1.5 UIU/ML (ref 0.4–4)
URN SPEC COLLECT METH UR: ABNORMAL
WBC # BLD AUTO: 28.58 K/UL (ref 3.9–12.7)
WBC #/AREA URNS AUTO: 0 /HPF (ref 0–5)

## 2021-03-19 PROCEDURE — 1101F PR PT FALLS ASSESS DOC 0-1 FALLS W/OUT INJ PAST YR: ICD-10-PCS | Mod: CPTII,S$GLB,, | Performed by: INTERNAL MEDICINE

## 2021-03-19 PROCEDURE — 99999 PR PBB SHADOW E&M-EST. PATIENT-LVL III: CPT | Mod: PBBFAC,,, | Performed by: INTERNAL MEDICINE

## 2021-03-19 PROCEDURE — 81001 URINALYSIS AUTO W/SCOPE: CPT | Performed by: INTERNAL MEDICINE

## 2021-03-19 PROCEDURE — 1159F PR MEDICATION LIST DOCUMENTED IN MEDICAL RECORD: ICD-10-PCS | Mod: S$GLB,,, | Performed by: INTERNAL MEDICINE

## 2021-03-19 PROCEDURE — 1159F MED LIST DOCD IN RCRD: CPT | Mod: S$GLB,,, | Performed by: INTERNAL MEDICINE

## 2021-03-19 PROCEDURE — 3288F PR FALLS RISK ASSESSMENT DOCUMENTED: ICD-10-PCS | Mod: CPTII,S$GLB,, | Performed by: INTERNAL MEDICINE

## 2021-03-19 PROCEDURE — 85007 BL SMEAR W/DIFF WBC COUNT: CPT | Performed by: INTERNAL MEDICINE

## 2021-03-19 PROCEDURE — 36415 COLL VENOUS BLD VENIPUNCTURE: CPT | Performed by: INTERNAL MEDICINE

## 2021-03-19 PROCEDURE — 85027 COMPLETE CBC AUTOMATED: CPT | Performed by: INTERNAL MEDICINE

## 2021-03-19 PROCEDURE — 1125F AMNT PAIN NOTED PAIN PRSNT: CPT | Mod: S$GLB,,, | Performed by: INTERNAL MEDICINE

## 2021-03-19 PROCEDURE — 99999 PR PBB SHADOW E&M-EST. PATIENT-LVL III: ICD-10-PCS | Mod: PBBFAC,,, | Performed by: INTERNAL MEDICINE

## 2021-03-19 PROCEDURE — 80053 COMPREHEN METABOLIC PANEL: CPT | Performed by: INTERNAL MEDICINE

## 2021-03-19 PROCEDURE — 1101F PT FALLS ASSESS-DOCD LE1/YR: CPT | Mod: CPTII,S$GLB,, | Performed by: INTERNAL MEDICINE

## 2021-03-19 PROCEDURE — 3288F FALL RISK ASSESSMENT DOCD: CPT | Mod: CPTII,S$GLB,, | Performed by: INTERNAL MEDICINE

## 2021-03-19 PROCEDURE — 1125F PR PAIN SEVERITY QUANTIFIED, PAIN PRESENT: ICD-10-PCS | Mod: S$GLB,,, | Performed by: INTERNAL MEDICINE

## 2021-03-19 PROCEDURE — 84443 ASSAY THYROID STIM HORMONE: CPT | Performed by: INTERNAL MEDICINE

## 2021-03-19 PROCEDURE — 99214 OFFICE O/P EST MOD 30 MIN: CPT | Mod: S$GLB,,, | Performed by: INTERNAL MEDICINE

## 2021-03-19 PROCEDURE — 87086 URINE CULTURE/COLONY COUNT: CPT | Performed by: INTERNAL MEDICINE

## 2021-03-19 PROCEDURE — 99214 PR OFFICE/OUTPT VISIT, EST, LEVL IV, 30-39 MIN: ICD-10-PCS | Mod: S$GLB,,, | Performed by: INTERNAL MEDICINE

## 2021-03-20 LAB
BACTERIA UR CULT: NORMAL
BACTERIA UR CULT: NORMAL

## 2021-03-21 ENCOUNTER — TELEPHONE (OUTPATIENT)
Dept: INTERNAL MEDICINE | Facility: CLINIC | Age: 81
End: 2021-03-21

## 2021-03-21 RX ORDER — NITROFURANTOIN (MACROCRYSTALS) 100 MG/1
100 CAPSULE ORAL EVERY 12 HOURS
Qty: 14 CAPSULE | Refills: 0 | Status: SHIPPED | OUTPATIENT
Start: 2021-03-21 | End: 2021-03-28

## 2021-03-27 ENCOUNTER — IMMUNIZATION (OUTPATIENT)
Dept: PRIMARY CARE CLINIC | Facility: CLINIC | Age: 81
End: 2021-03-27
Payer: MEDICARE

## 2021-03-27 DIAGNOSIS — Z23 NEED FOR VACCINATION: Primary | ICD-10-CM

## 2021-03-27 PROCEDURE — 0002A PR IMMUNIZ ADMIN, SARS-COV-2 COVID-19 VACC, 30MCG/0.3ML, 2ND DOSE: CPT | Mod: CV19,S$GLB,, | Performed by: INTERNAL MEDICINE

## 2021-03-27 PROCEDURE — 91300 PR SARS-COV- 2 COVID-19 VACCINE, NO PRSV, 30MCG/0.3ML, IM: CPT | Mod: S$GLB,,, | Performed by: INTERNAL MEDICINE

## 2021-03-27 PROCEDURE — 0002A PR IMMUNIZ ADMIN, SARS-COV-2 COVID-19 VACC, 30MCG/0.3ML, 2ND DOSE: ICD-10-PCS | Mod: CV19,S$GLB,, | Performed by: INTERNAL MEDICINE

## 2021-03-27 PROCEDURE — 91300 PR SARS-COV- 2 COVID-19 VACCINE, NO PRSV, 30MCG/0.3ML, IM: ICD-10-PCS | Mod: S$GLB,,, | Performed by: INTERNAL MEDICINE

## 2021-03-27 RX ADMIN — Medication 0.3 ML: at 11:03

## 2021-04-01 ENCOUNTER — OFFICE VISIT (OUTPATIENT)
Dept: PSYCHIATRY | Facility: CLINIC | Age: 81
End: 2021-04-01
Payer: COMMERCIAL

## 2021-04-01 DIAGNOSIS — F32.A DEPRESSION, UNSPECIFIED DEPRESSION TYPE: Primary | ICD-10-CM

## 2021-04-01 DIAGNOSIS — F41.9 ANXIETY: ICD-10-CM

## 2021-04-01 PROCEDURE — 90834 PSYTX W PT 45 MINUTES: CPT | Mod: S$GLB,,, | Performed by: PSYCHOLOGIST

## 2021-04-01 PROCEDURE — 90834 PR PSYCHOTHERAPY W/PATIENT, 45 MIN: ICD-10-PCS | Mod: S$GLB,,, | Performed by: PSYCHOLOGIST

## 2021-04-19 ENCOUNTER — OFFICE VISIT (OUTPATIENT)
Dept: PSYCHIATRY | Facility: CLINIC | Age: 81
End: 2021-04-19
Payer: MEDICARE

## 2021-04-19 DIAGNOSIS — F41.9 ANXIETY: ICD-10-CM

## 2021-04-19 DIAGNOSIS — F32.A DEPRESSION, UNSPECIFIED DEPRESSION TYPE: Primary | ICD-10-CM

## 2021-04-19 PROCEDURE — 90834 PSYTX W PT 45 MINUTES: CPT | Mod: S$GLB,,, | Performed by: PSYCHOLOGIST

## 2021-04-19 PROCEDURE — 90834 PR PSYCHOTHERAPY W/PATIENT, 45 MIN: ICD-10-PCS | Mod: S$GLB,,, | Performed by: PSYCHOLOGIST

## 2021-05-15 ENCOUNTER — PATIENT OUTREACH (OUTPATIENT)
Dept: ADMINISTRATIVE | Facility: OTHER | Age: 81
End: 2021-05-15

## 2021-05-17 ENCOUNTER — OFFICE VISIT (OUTPATIENT)
Dept: OPTOMETRY | Facility: CLINIC | Age: 81
End: 2021-05-17
Payer: MEDICARE

## 2021-05-17 DIAGNOSIS — H04.123 BILATERAL DRY EYES: ICD-10-CM

## 2021-05-17 DIAGNOSIS — H25.13 NUCLEAR SCLEROSIS OF BOTH EYES: Primary | ICD-10-CM

## 2021-05-17 DIAGNOSIS — H52.7 REFRACTIVE ERROR: ICD-10-CM

## 2021-05-17 DIAGNOSIS — Z83.518 FAMILY HISTORY OF MACULAR DEGENERATION: ICD-10-CM

## 2021-05-17 PROCEDURE — 92014 COMPRE OPH EXAM EST PT 1/>: CPT | Mod: S$GLB,,, | Performed by: OPTOMETRIST

## 2021-05-17 PROCEDURE — 99999 PR PBB SHADOW E&M-EST. PATIENT-LVL II: CPT | Mod: PBBFAC,,, | Performed by: OPTOMETRIST

## 2021-05-17 PROCEDURE — 99999 PR PBB SHADOW E&M-EST. PATIENT-LVL II: ICD-10-PCS | Mod: PBBFAC,,, | Performed by: OPTOMETRIST

## 2021-05-17 PROCEDURE — 1126F PR PAIN SEVERITY QUANTIFIED, NO PAIN PRESENT: ICD-10-PCS | Mod: S$GLB,,, | Performed by: OPTOMETRIST

## 2021-05-17 PROCEDURE — 92014 PR EYE EXAM, EST PATIENT,COMPREHESV: ICD-10-PCS | Mod: S$GLB,,, | Performed by: OPTOMETRIST

## 2021-05-17 PROCEDURE — 1126F AMNT PAIN NOTED NONE PRSNT: CPT | Mod: S$GLB,,, | Performed by: OPTOMETRIST

## 2021-06-07 ENCOUNTER — OFFICE VISIT (OUTPATIENT)
Dept: OPHTHALMOLOGY | Facility: CLINIC | Age: 81
End: 2021-06-07
Payer: MEDICARE

## 2021-06-07 DIAGNOSIS — B00.52 DENDRITIC KERATITIS OF LEFT EYE DUE TO HERPES SIMPLEX VIRUS (HSV): Primary | ICD-10-CM

## 2021-06-07 PROCEDURE — 92002 PR EYE EXAM, NEW PATIENT,INTERMED: ICD-10-PCS | Mod: S$GLB,,, | Performed by: OPHTHALMOLOGY

## 2021-06-07 PROCEDURE — 99999 PR PBB SHADOW E&M-EST. PATIENT-LVL III: ICD-10-PCS | Mod: PBBFAC,,, | Performed by: OPHTHALMOLOGY

## 2021-06-07 PROCEDURE — 1125F AMNT PAIN NOTED PAIN PRSNT: CPT | Mod: S$GLB,,, | Performed by: OPHTHALMOLOGY

## 2021-06-07 PROCEDURE — 99999 PR PBB SHADOW E&M-EST. PATIENT-LVL III: CPT | Mod: PBBFAC,,, | Performed by: OPHTHALMOLOGY

## 2021-06-07 PROCEDURE — 92002 INTRM OPH EXAM NEW PATIENT: CPT | Mod: S$GLB,,, | Performed by: OPHTHALMOLOGY

## 2021-06-07 PROCEDURE — 1125F PR PAIN SEVERITY QUANTIFIED, PAIN PRESENT: ICD-10-PCS | Mod: S$GLB,,, | Performed by: OPHTHALMOLOGY

## 2021-06-07 RX ORDER — VALACYCLOVIR HYDROCHLORIDE 1 G/1
1000 TABLET, FILM COATED ORAL 2 TIMES DAILY
Qty: 20 TABLET | Refills: 0 | Status: SHIPPED | OUTPATIENT
Start: 2021-06-07 | End: 2021-08-23

## 2021-06-07 RX ORDER — SERTRALINE HYDROCHLORIDE 50 MG/1
50 TABLET, FILM COATED ORAL DAILY
COMMUNITY
Start: 2021-06-03 | End: 2021-12-06

## 2021-06-07 RX ORDER — NITROFURANTOIN 25; 75 MG/1; MG/1
100 CAPSULE ORAL 2 TIMES DAILY
COMMUNITY
Start: 2021-05-26 | End: 2021-08-23

## 2021-06-14 ENCOUNTER — OFFICE VISIT (OUTPATIENT)
Dept: OPHTHALMOLOGY | Facility: CLINIC | Age: 81
End: 2021-06-14
Payer: MEDICARE

## 2021-06-14 DIAGNOSIS — B00.52 DENDRITIC KERATITIS OF LEFT EYE DUE TO HERPES SIMPLEX VIRUS (HSV): Primary | ICD-10-CM

## 2021-06-14 PROCEDURE — 92012 INTRM OPH EXAM EST PATIENT: CPT | Mod: S$GLB,,, | Performed by: OPHTHALMOLOGY

## 2021-06-14 PROCEDURE — 99999 PR PBB SHADOW E&M-EST. PATIENT-LVL III: ICD-10-PCS | Mod: PBBFAC,,, | Performed by: OPHTHALMOLOGY

## 2021-06-14 PROCEDURE — 1126F PR PAIN SEVERITY QUANTIFIED, NO PAIN PRESENT: ICD-10-PCS | Mod: S$GLB,,, | Performed by: OPHTHALMOLOGY

## 2021-06-14 PROCEDURE — 99999 PR PBB SHADOW E&M-EST. PATIENT-LVL III: CPT | Mod: PBBFAC,,, | Performed by: OPHTHALMOLOGY

## 2021-06-14 PROCEDURE — 92012 PR EYE EXAM, EST PATIENT,INTERMED: ICD-10-PCS | Mod: S$GLB,,, | Performed by: OPHTHALMOLOGY

## 2021-06-14 PROCEDURE — 1126F AMNT PAIN NOTED NONE PRSNT: CPT | Mod: S$GLB,,, | Performed by: OPHTHALMOLOGY

## 2021-06-17 ENCOUNTER — TELEPHONE (OUTPATIENT)
Dept: HEMATOLOGY/ONCOLOGY | Facility: CLINIC | Age: 81
End: 2021-06-17

## 2021-08-16 ENCOUNTER — TELEPHONE (OUTPATIENT)
Dept: INTERNAL MEDICINE | Facility: CLINIC | Age: 81
End: 2021-08-16

## 2021-08-23 ENCOUNTER — OFFICE VISIT (OUTPATIENT)
Dept: HEMATOLOGY/ONCOLOGY | Facility: CLINIC | Age: 81
End: 2021-08-23
Payer: MEDICARE

## 2021-08-23 VITALS
HEIGHT: 64 IN | DIASTOLIC BLOOD PRESSURE: 55 MMHG | OXYGEN SATURATION: 95 % | BODY MASS INDEX: 24.22 KG/M2 | RESPIRATION RATE: 16 BRPM | SYSTOLIC BLOOD PRESSURE: 114 MMHG | WEIGHT: 141.88 LBS | TEMPERATURE: 98 F | HEART RATE: 66 BPM

## 2021-08-23 DIAGNOSIS — C91.10 CHRONIC LYMPHOCYTIC LEUKEMIA: Primary | ICD-10-CM

## 2021-08-23 DIAGNOSIS — N39.0 RECURRENT UTI: ICD-10-CM

## 2021-08-23 PROCEDURE — 3288F FALL RISK ASSESSMENT DOCD: CPT | Mod: CPTII,S$GLB,, | Performed by: INTERNAL MEDICINE

## 2021-08-23 PROCEDURE — 99214 OFFICE O/P EST MOD 30 MIN: CPT | Mod: S$GLB,,, | Performed by: INTERNAL MEDICINE

## 2021-08-23 PROCEDURE — 3288F PR FALLS RISK ASSESSMENT DOCUMENTED: ICD-10-PCS | Mod: CPTII,S$GLB,, | Performed by: INTERNAL MEDICINE

## 2021-08-23 PROCEDURE — 1160F RVW MEDS BY RX/DR IN RCRD: CPT | Mod: CPTII,S$GLB,, | Performed by: INTERNAL MEDICINE

## 2021-08-23 PROCEDURE — 99999 PR PBB SHADOW E&M-EST. PATIENT-LVL III: CPT | Mod: PBBFAC,,, | Performed by: INTERNAL MEDICINE

## 2021-08-23 PROCEDURE — 3074F PR MOST RECENT SYSTOLIC BLOOD PRESSURE < 130 MM HG: ICD-10-PCS | Mod: CPTII,S$GLB,, | Performed by: INTERNAL MEDICINE

## 2021-08-23 PROCEDURE — 1126F PR PAIN SEVERITY QUANTIFIED, NO PAIN PRESENT: ICD-10-PCS | Mod: CPTII,S$GLB,, | Performed by: INTERNAL MEDICINE

## 2021-08-23 PROCEDURE — 99214 PR OFFICE/OUTPT VISIT, EST, LEVL IV, 30-39 MIN: ICD-10-PCS | Mod: S$GLB,,, | Performed by: INTERNAL MEDICINE

## 2021-08-23 PROCEDURE — 3078F DIAST BP <80 MM HG: CPT | Mod: CPTII,S$GLB,, | Performed by: INTERNAL MEDICINE

## 2021-08-23 PROCEDURE — 3074F SYST BP LT 130 MM HG: CPT | Mod: CPTII,S$GLB,, | Performed by: INTERNAL MEDICINE

## 2021-08-23 PROCEDURE — 3078F PR MOST RECENT DIASTOLIC BLOOD PRESSURE < 80 MM HG: ICD-10-PCS | Mod: CPTII,S$GLB,, | Performed by: INTERNAL MEDICINE

## 2021-08-23 PROCEDURE — 1160F PR REVIEW ALL MEDS BY PRESCRIBER/CLIN PHARMACIST DOCUMENTED: ICD-10-PCS | Mod: CPTII,S$GLB,, | Performed by: INTERNAL MEDICINE

## 2021-08-23 PROCEDURE — 1101F PR PT FALLS ASSESS DOC 0-1 FALLS W/OUT INJ PAST YR: ICD-10-PCS | Mod: CPTII,S$GLB,, | Performed by: INTERNAL MEDICINE

## 2021-08-23 PROCEDURE — 99999 PR PBB SHADOW E&M-EST. PATIENT-LVL III: ICD-10-PCS | Mod: PBBFAC,,, | Performed by: INTERNAL MEDICINE

## 2021-08-23 PROCEDURE — 1101F PT FALLS ASSESS-DOCD LE1/YR: CPT | Mod: CPTII,S$GLB,, | Performed by: INTERNAL MEDICINE

## 2021-08-23 PROCEDURE — 1159F PR MEDICATION LIST DOCUMENTED IN MEDICAL RECORD: ICD-10-PCS | Mod: CPTII,S$GLB,, | Performed by: INTERNAL MEDICINE

## 2021-08-23 PROCEDURE — 1159F MED LIST DOCD IN RCRD: CPT | Mod: CPTII,S$GLB,, | Performed by: INTERNAL MEDICINE

## 2021-08-23 PROCEDURE — 1126F AMNT PAIN NOTED NONE PRSNT: CPT | Mod: CPTII,S$GLB,, | Performed by: INTERNAL MEDICINE

## 2021-08-23 RX ORDER — DONEPEZIL HYDROCHLORIDE 5 MG/1
5 TABLET, FILM COATED ORAL DAILY
COMMUNITY
Start: 2021-08-20 | End: 2021-12-06

## 2021-11-21 NOTE — PROGRESS NOTES
Subjective:       Patient ID: Jorge Montgomery is a 77 y.o. female.    Chief Complaint: Follow-up    HPI     Returns for follow-up of CLL.  Overall doing well.   No fevers, chills or recurrent infections (2 sinus infections in last year). No night sweats.  No weight loss.  No lymphadenopathy.  No bleeding or bruising.    Review of Systems   Constitutional: Negative for activity change, appetite change, chills, diaphoresis, fatigue, fever and unexpected weight change.   HENT: Negative for congestion, ear pain, mouth sores, nosebleeds, postnasal drip, rhinorrhea, sinus pressure, sneezing, sore throat, tinnitus and voice change.    Eyes: Negative for redness and visual disturbance.   Respiratory: Negative for cough, chest tightness, shortness of breath and wheezing.    Cardiovascular: Negative for chest pain, palpitations and leg swelling.   Gastrointestinal: Negative for abdominal distention, abdominal pain, blood in stool, constipation, diarrhea, nausea and vomiting.   Genitourinary: Negative for decreased urine volume, difficulty urinating, dysuria, frequency, hematuria, urgency and vaginal bleeding.   Musculoskeletal: Negative for arthralgias, back pain, joint swelling, myalgias, neck pain and neck stiffness.   Skin: Negative for color change, pallor, rash and wound.   Allergic/Immunologic: Negative for environmental allergies.   Neurological: Negative for dizziness, weakness, light-headedness, numbness and headaches.   Hematological: Negative for adenopathy. Does not bruise/bleed easily.   Psychiatric/Behavioral: Negative for confusion, decreased concentration and dysphoric mood.       Objective:      Physical Exam   Constitutional: She is oriented to person, place, and time. She appears well-developed and well-nourished. No distress.   Presents alone   HENT:   Head: Normocephalic and atraumatic.   Right Ear: External ear normal.   Left Ear: External ear normal.   Nose: Nose normal.   Mouth/Throat: Oropharynx  is clear and moist. No oropharyngeal exudate.   Eyes: Conjunctivae and EOM are normal. Pupils are equal, round, and reactive to light. Right eye exhibits no discharge. Left eye exhibits no discharge. No scleral icterus.   Neck: Normal range of motion. Neck supple. No JVD present. No tracheal deviation present. No thyromegaly present.   Cardiovascular: Normal rate, regular rhythm, normal heart sounds and intact distal pulses. Exam reveals no gallop and no friction rub.   No murmur heard.  Pulmonary/Chest: Effort normal and breath sounds normal. No stridor. No respiratory distress. She has no wheezes. She has no rales. She exhibits no tenderness.   Abdominal: Soft. Bowel sounds are normal. She exhibits no distension and no mass. There is no tenderness. There is no rebound and no guarding.   No hepatosplenomegaly   Musculoskeletal: Normal range of motion. She exhibits no edema or tenderness.   Lymphadenopathy:        Head (right side): No submandibular, no preauricular, no posterior auricular and no occipital adenopathy present.        Head (left side): No submandibular, no preauricular, no posterior auricular and no occipital adenopathy present.     She has no cervical adenopathy.        Right cervical: No superficial cervical, no deep cervical and no posterior cervical adenopathy present.       Left cervical: No superficial cervical, no deep cervical and no posterior cervical adenopathy present.     She has no axillary adenopathy.        Right: No inguinal and no supraclavicular adenopathy present.        Left: No inguinal and no supraclavicular adenopathy present.   Neurological: She is alert and oriented to person, place, and time. No cranial nerve deficit. She exhibits normal muscle tone. Coordination normal.   Skin: Skin is warm and dry. No rash noted. She is not diaphoretic. No erythema. No pallor.   Psychiatric: She has a normal mood and affect. Her behavior is normal. Judgment and thought content normal.    Nursing note and vitals reviewed.    Labs- reviewed  Assessment:       1. Chronic lymphocytic leukemia        Plan:     1. We reviewed parameters- stable  She is doing well  We reviewed again the indications for treatment- at this point none required and she will continue surveillance  She knows indications to call for and is aware to call for any issues of concern or questions she may have  RTC 3-4 months with labs, sooner if clinically indicated       No

## 2021-12-06 ENCOUNTER — LAB VISIT (OUTPATIENT)
Dept: LAB | Facility: HOSPITAL | Age: 81
End: 2021-12-06
Attending: INTERNAL MEDICINE
Payer: MEDICARE

## 2021-12-06 ENCOUNTER — OFFICE VISIT (OUTPATIENT)
Dept: HEMATOLOGY/ONCOLOGY | Facility: CLINIC | Age: 81
End: 2021-12-06
Payer: MEDICARE

## 2021-12-06 VITALS
WEIGHT: 138.25 LBS | TEMPERATURE: 98 F | OXYGEN SATURATION: 96 % | HEIGHT: 64 IN | SYSTOLIC BLOOD PRESSURE: 120 MMHG | RESPIRATION RATE: 16 BRPM | BODY MASS INDEX: 23.6 KG/M2 | DIASTOLIC BLOOD PRESSURE: 59 MMHG | HEART RATE: 65 BPM

## 2021-12-06 DIAGNOSIS — C91.10 CHRONIC LYMPHOCYTIC LEUKEMIA: Primary | ICD-10-CM

## 2021-12-06 DIAGNOSIS — C91.10 CHRONIC LYMPHOCYTIC LEUKEMIA: ICD-10-CM

## 2021-12-06 LAB
ALBUMIN SERPL BCP-MCNC: 3.8 G/DL (ref 3.5–5.2)
ALP SERPL-CCNC: 77 U/L (ref 55–135)
ALT SERPL W/O P-5'-P-CCNC: 19 U/L (ref 10–44)
ANION GAP SERPL CALC-SCNC: 7 MMOL/L (ref 8–16)
ANISOCYTOSIS BLD QL SMEAR: SLIGHT
AST SERPL-CCNC: 22 U/L (ref 10–40)
BASOPHILS NFR BLD: 0 % (ref 0–1.9)
BILIRUB SERPL-MCNC: 0.6 MG/DL (ref 0.1–1)
BUN SERPL-MCNC: 13 MG/DL (ref 8–23)
CALCIUM SERPL-MCNC: 9.9 MG/DL (ref 8.7–10.5)
CHLORIDE SERPL-SCNC: 104 MMOL/L (ref 95–110)
CO2 SERPL-SCNC: 32 MMOL/L (ref 23–29)
CREAT SERPL-MCNC: 0.8 MG/DL (ref 0.5–1.4)
DIFFERENTIAL METHOD: ABNORMAL
EOSINOPHIL NFR BLD: 0 % (ref 0–8)
ERYTHROCYTE [DISTWIDTH] IN BLOOD BY AUTOMATED COUNT: 13.8 % (ref 11.5–14.5)
EST. GFR  (AFRICAN AMERICAN): >60 ML/MIN/1.73 M^2
EST. GFR  (NON AFRICAN AMERICAN): >60 ML/MIN/1.73 M^2
GLUCOSE SERPL-MCNC: 113 MG/DL (ref 70–110)
HCT VFR BLD AUTO: 43.3 % (ref 37–48.5)
HGB BLD-MCNC: 13.8 G/DL (ref 12–16)
IMM GRANULOCYTES # BLD AUTO: ABNORMAL K/UL (ref 0–0.04)
IMM GRANULOCYTES NFR BLD AUTO: ABNORMAL % (ref 0–0.5)
LDH SERPL L TO P-CCNC: 182 U/L (ref 110–260)
LYMPHOCYTES NFR BLD: 70 % (ref 18–48)
MCH RBC QN AUTO: 28.8 PG (ref 27–31)
MCHC RBC AUTO-ENTMCNC: 31.9 G/DL (ref 32–36)
MCV RBC AUTO: 90 FL (ref 82–98)
MONOCYTES NFR BLD: 3 % (ref 4–15)
NEUTROPHILS NFR BLD: 27 % (ref 38–73)
NRBC BLD-RTO: 0 /100 WBC
PLATELET # BLD AUTO: 248 K/UL (ref 150–450)
PMV BLD AUTO: 10 FL (ref 9.2–12.9)
POIKILOCYTOSIS BLD QL SMEAR: SLIGHT
POLYCHROMASIA BLD QL SMEAR: ABNORMAL
POTASSIUM SERPL-SCNC: 4.4 MMOL/L (ref 3.5–5.1)
PROT SERPL-MCNC: 6.7 G/DL (ref 6–8.4)
RBC # BLD AUTO: 4.8 M/UL (ref 4–5.4)
SMUDGE CELLS BLD QL SMEAR: PRESENT
SODIUM SERPL-SCNC: 143 MMOL/L (ref 136–145)
WBC # BLD AUTO: 31.9 K/UL (ref 3.9–12.7)

## 2021-12-06 PROCEDURE — 99999 PR PBB SHADOW E&M-EST. PATIENT-LVL III: CPT | Mod: PBBFAC,,, | Performed by: INTERNAL MEDICINE

## 2021-12-06 PROCEDURE — 85060 BLOOD SMEAR INTERPRETATION: CPT | Mod: ,,, | Performed by: PATHOLOGY

## 2021-12-06 PROCEDURE — 85027 COMPLETE CBC AUTOMATED: CPT | Performed by: INTERNAL MEDICINE

## 2021-12-06 PROCEDURE — 99999 PR PBB SHADOW E&M-EST. PATIENT-LVL III: ICD-10-PCS | Mod: PBBFAC,,, | Performed by: INTERNAL MEDICINE

## 2021-12-06 PROCEDURE — 99214 OFFICE O/P EST MOD 30 MIN: CPT | Mod: S$GLB,,, | Performed by: INTERNAL MEDICINE

## 2021-12-06 PROCEDURE — 36415 COLL VENOUS BLD VENIPUNCTURE: CPT | Performed by: INTERNAL MEDICINE

## 2021-12-06 PROCEDURE — 83615 LACTATE (LD) (LDH) ENZYME: CPT | Performed by: INTERNAL MEDICINE

## 2021-12-06 PROCEDURE — 85007 BL SMEAR W/DIFF WBC COUNT: CPT | Performed by: INTERNAL MEDICINE

## 2021-12-06 PROCEDURE — 99214 PR OFFICE/OUTPT VISIT, EST, LEVL IV, 30-39 MIN: ICD-10-PCS | Mod: S$GLB,,, | Performed by: INTERNAL MEDICINE

## 2021-12-06 PROCEDURE — 85060 PATHOLOGIST REVIEW: ICD-10-PCS | Mod: ,,, | Performed by: PATHOLOGY

## 2021-12-06 PROCEDURE — 80053 COMPREHEN METABOLIC PANEL: CPT | Performed by: INTERNAL MEDICINE

## 2021-12-06 RX ORDER — SERTRALINE HYDROCHLORIDE 100 MG/1
100 TABLET, FILM COATED ORAL DAILY
COMMUNITY
Start: 2021-09-30

## 2021-12-06 RX ORDER — DONEPEZIL HYDROCHLORIDE 10 MG/1
10 TABLET, FILM COATED ORAL DAILY
COMMUNITY
Start: 2021-11-22

## 2021-12-06 RX ORDER — LORAZEPAM 0.5 MG/1
0.5 TABLET ORAL DAILY PRN
COMMUNITY
Start: 2021-11-24

## 2021-12-07 LAB — PATH REV BLD -IMP: NORMAL

## 2022-02-10 ENCOUNTER — PES CALL (OUTPATIENT)
Dept: ADMINISTRATIVE | Facility: CLINIC | Age: 82
End: 2022-02-10
Payer: MEDICARE

## 2022-02-22 DIAGNOSIS — D84.9 IMMUNOSUPPRESSED STATUS: ICD-10-CM

## 2022-02-25 ENCOUNTER — TELEPHONE (OUTPATIENT)
Dept: HEMATOLOGY/ONCOLOGY | Facility: CLINIC | Age: 82
End: 2022-02-25
Payer: MEDICARE

## 2022-02-25 NOTE — TELEPHONE ENCOUNTER
Left vm message for patient to return call regarding date/time change for upcoming follow-up appointment.

## 2022-03-10 ENCOUNTER — TELEPHONE (OUTPATIENT)
Dept: HEMATOLOGY/ONCOLOGY | Facility: CLINIC | Age: 82
End: 2022-03-10
Payer: MEDICARE

## 2022-03-11 ENCOUNTER — OFFICE VISIT (OUTPATIENT)
Dept: HEMATOLOGY/ONCOLOGY | Facility: CLINIC | Age: 82
End: 2022-03-11
Payer: MEDICARE

## 2022-03-11 ENCOUNTER — LAB VISIT (OUTPATIENT)
Dept: LAB | Facility: HOSPITAL | Age: 82
End: 2022-03-11
Attending: INTERNAL MEDICINE
Payer: MEDICARE

## 2022-03-11 VITALS
SYSTOLIC BLOOD PRESSURE: 130 MMHG | HEART RATE: 55 BPM | HEIGHT: 64 IN | WEIGHT: 135.81 LBS | OXYGEN SATURATION: 98 % | BODY MASS INDEX: 23.18 KG/M2 | DIASTOLIC BLOOD PRESSURE: 60 MMHG | TEMPERATURE: 98 F

## 2022-03-11 DIAGNOSIS — C91.10 CHRONIC LYMPHOCYTIC LEUKEMIA: Primary | ICD-10-CM

## 2022-03-11 DIAGNOSIS — C91.10 CHRONIC LYMPHOCYTIC LEUKEMIA: ICD-10-CM

## 2022-03-11 LAB
ALBUMIN SERPL BCP-MCNC: 3.8 G/DL (ref 3.5–5.2)
ALP SERPL-CCNC: 79 U/L (ref 55–135)
ALT SERPL W/O P-5'-P-CCNC: 17 U/L (ref 10–44)
ANION GAP SERPL CALC-SCNC: 10 MMOL/L (ref 8–16)
ANISOCYTOSIS BLD QL SMEAR: SLIGHT
AST SERPL-CCNC: 20 U/L (ref 10–40)
BASOPHILS # BLD AUTO: ABNORMAL K/UL (ref 0–0.2)
BASOPHILS NFR BLD: 0 % (ref 0–1.9)
BILIRUB SERPL-MCNC: 0.4 MG/DL (ref 0.1–1)
BUN SERPL-MCNC: 17 MG/DL (ref 8–23)
CALCIUM SERPL-MCNC: 9.9 MG/DL (ref 8.7–10.5)
CHLORIDE SERPL-SCNC: 101 MMOL/L (ref 95–110)
CO2 SERPL-SCNC: 31 MMOL/L (ref 23–29)
CREAT SERPL-MCNC: 0.8 MG/DL (ref 0.5–1.4)
DIFFERENTIAL METHOD: ABNORMAL
EOSINOPHIL # BLD AUTO: ABNORMAL K/UL (ref 0–0.5)
EOSINOPHIL NFR BLD: 1 % (ref 0–8)
ERYTHROCYTE [DISTWIDTH] IN BLOOD BY AUTOMATED COUNT: 14.2 % (ref 11.5–14.5)
EST. GFR  (AFRICAN AMERICAN): >60 ML/MIN/1.73 M^2
EST. GFR  (NON AFRICAN AMERICAN): >60 ML/MIN/1.73 M^2
GLUCOSE SERPL-MCNC: 122 MG/DL (ref 70–110)
HCT VFR BLD AUTO: 44.7 % (ref 37–48.5)
HGB BLD-MCNC: 14 G/DL (ref 12–16)
IMM GRANULOCYTES # BLD AUTO: ABNORMAL K/UL (ref 0–0.04)
IMM GRANULOCYTES NFR BLD AUTO: ABNORMAL % (ref 0–0.5)
LDH SERPL L TO P-CCNC: 155 U/L (ref 110–260)
LYMPHOCYTES # BLD AUTO: ABNORMAL K/UL (ref 1–4.8)
LYMPHOCYTES NFR BLD: 65 % (ref 18–48)
MCH RBC QN AUTO: 28.5 PG (ref 27–31)
MCHC RBC AUTO-ENTMCNC: 31.3 G/DL (ref 32–36)
MCV RBC AUTO: 91 FL (ref 82–98)
MONOCYTES # BLD AUTO: ABNORMAL K/UL (ref 0.3–1)
MONOCYTES NFR BLD: 3 % (ref 4–15)
NEUTROPHILS NFR BLD: 31 % (ref 38–73)
NRBC BLD-RTO: 0 /100 WBC
PLATELET # BLD AUTO: 236 K/UL (ref 150–450)
PLATELET BLD QL SMEAR: ABNORMAL
PMV BLD AUTO: 10 FL (ref 9.2–12.9)
POTASSIUM SERPL-SCNC: 4.3 MMOL/L (ref 3.5–5.1)
PROT SERPL-MCNC: 6.5 G/DL (ref 6–8.4)
RBC # BLD AUTO: 4.92 M/UL (ref 4–5.4)
SODIUM SERPL-SCNC: 142 MMOL/L (ref 136–145)
WBC # BLD AUTO: 25.32 K/UL (ref 3.9–12.7)

## 2022-03-11 PROCEDURE — 99999 PR PBB SHADOW E&M-EST. PATIENT-LVL III: CPT | Mod: PBBFAC,,, | Performed by: NURSE PRACTITIONER

## 2022-03-11 PROCEDURE — 3075F PR MOST RECENT SYSTOLIC BLOOD PRESS GE 130-139MM HG: ICD-10-PCS | Mod: CPTII,S$GLB,, | Performed by: NURSE PRACTITIONER

## 2022-03-11 PROCEDURE — 99214 PR OFFICE/OUTPT VISIT, EST, LEVL IV, 30-39 MIN: ICD-10-PCS | Mod: S$GLB,,, | Performed by: NURSE PRACTITIONER

## 2022-03-11 PROCEDURE — 3078F DIAST BP <80 MM HG: CPT | Mod: CPTII,S$GLB,, | Performed by: NURSE PRACTITIONER

## 2022-03-11 PROCEDURE — 3288F PR FALLS RISK ASSESSMENT DOCUMENTED: ICD-10-PCS | Mod: CPTII,S$GLB,, | Performed by: NURSE PRACTITIONER

## 2022-03-11 PROCEDURE — 1126F PR PAIN SEVERITY QUANTIFIED, NO PAIN PRESENT: ICD-10-PCS | Mod: CPTII,S$GLB,, | Performed by: NURSE PRACTITIONER

## 2022-03-11 PROCEDURE — 80053 COMPREHEN METABOLIC PANEL: CPT | Performed by: INTERNAL MEDICINE

## 2022-03-11 PROCEDURE — 1101F PT FALLS ASSESS-DOCD LE1/YR: CPT | Mod: CPTII,S$GLB,, | Performed by: NURSE PRACTITIONER

## 2022-03-11 PROCEDURE — 99214 OFFICE O/P EST MOD 30 MIN: CPT | Mod: S$GLB,,, | Performed by: NURSE PRACTITIONER

## 2022-03-11 PROCEDURE — 3078F PR MOST RECENT DIASTOLIC BLOOD PRESSURE < 80 MM HG: ICD-10-PCS | Mod: CPTII,S$GLB,, | Performed by: NURSE PRACTITIONER

## 2022-03-11 PROCEDURE — 3075F SYST BP GE 130 - 139MM HG: CPT | Mod: CPTII,S$GLB,, | Performed by: NURSE PRACTITIONER

## 2022-03-11 PROCEDURE — 83615 LACTATE (LD) (LDH) ENZYME: CPT | Performed by: INTERNAL MEDICINE

## 2022-03-11 PROCEDURE — 36415 COLL VENOUS BLD VENIPUNCTURE: CPT | Performed by: INTERNAL MEDICINE

## 2022-03-11 PROCEDURE — 1101F PR PT FALLS ASSESS DOC 0-1 FALLS W/OUT INJ PAST YR: ICD-10-PCS | Mod: CPTII,S$GLB,, | Performed by: NURSE PRACTITIONER

## 2022-03-11 PROCEDURE — 3288F FALL RISK ASSESSMENT DOCD: CPT | Mod: CPTII,S$GLB,, | Performed by: NURSE PRACTITIONER

## 2022-03-11 PROCEDURE — 85027 COMPLETE CBC AUTOMATED: CPT | Performed by: INTERNAL MEDICINE

## 2022-03-11 PROCEDURE — 1126F AMNT PAIN NOTED NONE PRSNT: CPT | Mod: CPTII,S$GLB,, | Performed by: NURSE PRACTITIONER

## 2022-03-11 PROCEDURE — 85007 BL SMEAR W/DIFF WBC COUNT: CPT | Performed by: INTERNAL MEDICINE

## 2022-03-11 PROCEDURE — 99999 PR PBB SHADOW E&M-EST. PATIENT-LVL III: ICD-10-PCS | Mod: PBBFAC,,, | Performed by: NURSE PRACTITIONER

## 2022-03-11 NOTE — PROGRESS NOTES
Subjective:       Patient ID: Jorge Montgomery is a 81 y.o. female.    Chief Complaint: No chief complaint on file.    HPI     History of Present Illness: Ms. Montgomery is a 81 y.o. who returns in follow up for CLL.      Today, reports feeling well.   States no new issues.  No fevers, chills or recurrent infections. No night sweats.  No weight loss.  No lymphadenopathy.  No bleeding or bruising.  No depression at his time.   No pain  Notes no lymphadenopathy     She is accompanied by her daughter         Review of Systems   Constitutional: Negative for appetite change and unexpected weight change.   HENT: Negative for nasal congestion, postnasal drip, sinus pressure/congestion, sore throat and trouble swallowing.    Eyes: Negative for visual disturbance.   Respiratory: Negative for cough and shortness of breath.    Cardiovascular: Negative for chest pain, palpitations and leg swelling.   Gastrointestinal: Negative for abdominal distention, abdominal pain, blood in stool, change in bowel habit, constipation, diarrhea (rare with diet), nausea, vomiting, reflux and change in bowel habit.   Genitourinary: Negative for decreased urine volume, difficulty urinating, dysuria, frequency and urgency.   Musculoskeletal: Negative for back pain.   Integumentary:  Negative for rash.   Neurological: Negative for headaches.   Hematological: Negative for adenopathy. Does not bruise/bleed easily.   Psychiatric/Behavioral: The patient is not nervous/anxious.          Objective:      Physical Exam  Vitals and nursing note reviewed.   Constitutional:       General: She is not in acute distress.     Appearance: Normal appearance. She is well-developed and normal weight. She is not diaphoretic.      Comments: Presents with her daughter  ECOG=0  Very pleasant   HENT:      Head: Normocephalic and atraumatic.   Eyes:      General: No scleral icterus.     Extraocular Movements: Extraocular movements intact.      Conjunctiva/sclera:  Conjunctivae normal.      Pupils: Pupils are equal, round, and reactive to light.   Neck:      Thyroid: No thyromegaly.   Cardiovascular:      Rate and Rhythm: Normal rate and regular rhythm.      Heart sounds: Normal heart sounds. No murmur heard.    No friction rub. No gallop.   Pulmonary:      Effort: Pulmonary effort is normal. No respiratory distress.      Breath sounds: Normal breath sounds. No wheezing, rhonchi or rales.   Chest:   Breasts:      Right: No axillary adenopathy.      Left: No axillary adenopathy.       Abdominal:      General: Abdomen is flat. Bowel sounds are normal. There is no distension.      Palpations: Abdomen is soft. There is no mass.      Tenderness: There is no abdominal tenderness. There is no guarding or rebound.      Comments: No hepatosplenomegaly   Musculoskeletal:         General: No swelling, tenderness or deformity. Normal range of motion.      Cervical back: Normal range of motion and neck supple.      Right lower leg: No edema.      Left lower leg: No edema.   Lymphadenopathy:      Head:      Right side of head: No submandibular, preauricular, posterior auricular or occipital adenopathy.      Left side of head: No submandibular, preauricular, posterior auricular or occipital adenopathy.      Cervical: No cervical adenopathy.      Right cervical: No superficial, deep or posterior cervical adenopathy.     Left cervical: No superficial, deep or posterior cervical adenopathy.      Upper Body:      Right upper body: No axillary adenopathy.      Left upper body: No axillary adenopathy.      Lower Body: No right inguinal adenopathy. No left inguinal adenopathy.   Skin:     General: Skin is warm and dry.      Coloration: Skin is not jaundiced or pale.      Findings: No bruising, erythema, lesion or rash.   Neurological:      General: No focal deficit present.      Mental Status: She is alert and oriented to person, place, and time.      Sensory: No sensory deficit.      Motor: No  weakness.      Coordination: Coordination normal.      Gait: Gait normal.   Psychiatric:         Mood and Affect: Mood normal.         Behavior: Behavior normal.         Thought Content: Thought content normal.         Judgment: Judgment normal.       Labs- reviewed  Assessment:       Problem List Items Addressed This Visit        Oncology    Chronic lymphocytic leukemia - Primary    Relevant Orders    CBC Oncology    Comprehensive Metabolic Panel    Lactate Dehydrogenase          Plan:         Does not meet lab parameters or clinical indications to treat.  Knows indications for which to call.  RTC 4 months with labs to see Dr. Merchant.    Route Chart for Scheduling    Med Onc Chart Routing      Follow up with physician 3 months.   Follow up with [facility]    Labs CBC, CMP and LDH   Lab interval:     Imaging    Pharmacy appointment    Other referrals             Patient is in agreement with the proposed treatment plan. All questions were answered to the patient's satisfaction. Pt knows to call clinic for any new or worsening symptoms and if anything is needed before the next clinic visit.      NICOLAS PantojaP-C  Hematology & Oncology  [facility] [place] [address]  ph. [phone]  Fax. [phone]     Face to Face time with patient: 30 minutes  35 minutes of total time spent on the encounter, which includes face to face time and non-face to face time preparing to see the patient (eg, review of tests), Obtaining and/or reviewing separately obtained history, Documenting clinical information in the electronic or other health record, Independently interpreting results (not separately reported) and communicating results to the patient/family/caregiver, or Care coordination (not separately reported).

## 2022-04-20 ENCOUNTER — HOSPITAL ENCOUNTER (EMERGENCY)
Facility: HOSPITAL | Age: 82
Discharge: HOME | End: 2022-04-21
Attending: EMERGENCY MEDICINE
Payer: MEDICARE

## 2022-04-20 DIAGNOSIS — R53.83 FATIGUE: ICD-10-CM

## 2022-04-20 DIAGNOSIS — Q50.39 OVARIAN ANOMALY: ICD-10-CM

## 2022-04-20 DIAGNOSIS — R19.7 DIARRHEA, UNSPECIFIED TYPE: ICD-10-CM

## 2022-04-20 DIAGNOSIS — R10.9 ABDOMINAL PAIN, UNSPECIFIED ABDOMINAL LOCATION: Primary | ICD-10-CM

## 2022-04-20 DIAGNOSIS — R11.2 NAUSEA AND VOMITING, INTRACTABILITY OF VOMITING NOT SPECIFIED, UNSPECIFIED VOMITING TYPE: ICD-10-CM

## 2022-04-20 LAB
ALBUMIN SERPL BCP-MCNC: 3.9 G/DL (ref 3.5–5.2)
ALP SERPL-CCNC: 75 U/L (ref 55–135)
ALT SERPL W/O P-5'-P-CCNC: 13 U/L (ref 10–44)
ANION GAP SERPL CALC-SCNC: 12 MMOL/L (ref 8–16)
AST SERPL-CCNC: 22 U/L (ref 10–40)
BACTERIA #/AREA URNS AUTO: ABNORMAL /HPF
BASOPHILS # BLD AUTO: ABNORMAL K/UL (ref 0–0.2)
BASOPHILS NFR BLD: 0 % (ref 0–1.9)
BILIRUB SERPL-MCNC: 0.7 MG/DL (ref 0.1–1)
BILIRUB UR QL STRIP: NEGATIVE
BUN SERPL-MCNC: 18 MG/DL (ref 8–23)
BUN SERPL-MCNC: 21 MG/DL (ref 6–30)
CALCIUM SERPL-MCNC: 9.5 MG/DL (ref 8.7–10.5)
CHLORIDE SERPL-SCNC: 103 MMOL/L (ref 95–110)
CHLORIDE SERPL-SCNC: 104 MMOL/L (ref 95–110)
CLARITY UR REFRACT.AUTO: ABNORMAL
CO2 SERPL-SCNC: 26 MMOL/L (ref 23–29)
COLOR UR AUTO: YELLOW
CREAT SERPL-MCNC: 0.7 MG/DL (ref 0.5–1.4)
CREAT SERPL-MCNC: 0.8 MG/DL (ref 0.5–1.4)
DIFFERENTIAL METHOD: ABNORMAL
EOSINOPHIL # BLD AUTO: ABNORMAL K/UL (ref 0–0.5)
EOSINOPHIL NFR BLD: 0 % (ref 0–8)
ERYTHROCYTE [DISTWIDTH] IN BLOOD BY AUTOMATED COUNT: 13.9 % (ref 11.5–14.5)
EST. GFR  (AFRICAN AMERICAN): >60 ML/MIN/1.73 M^2
EST. GFR  (NON AFRICAN AMERICAN): >60 ML/MIN/1.73 M^2
GLUCOSE SERPL-MCNC: 155 MG/DL (ref 70–110)
GLUCOSE SERPL-MCNC: 158 MG/DL (ref 70–110)
GLUCOSE UR QL STRIP: NEGATIVE
HCT VFR BLD AUTO: 40.6 % (ref 37–48.5)
HCT VFR BLD CALC: 39 %PCV (ref 36–54)
HGB BLD-MCNC: 13.4 G/DL (ref 12–16)
HGB UR QL STRIP: ABNORMAL
IMM GRANULOCYTES # BLD AUTO: ABNORMAL K/UL (ref 0–0.04)
IMM GRANULOCYTES NFR BLD AUTO: ABNORMAL % (ref 0–0.5)
KETONES UR QL STRIP: ABNORMAL
LACTATE SERPL-SCNC: 1.6 MMOL/L (ref 0.5–2.2)
LEUKOCYTE ESTERASE UR QL STRIP: ABNORMAL
LIPASE SERPL-CCNC: 25 U/L (ref 4–60)
LYMPHOCYTES # BLD AUTO: ABNORMAL K/UL (ref 1–4.8)
LYMPHOCYTES NFR BLD: 68 % (ref 18–48)
MCH RBC QN AUTO: 29.4 PG (ref 27–31)
MCHC RBC AUTO-ENTMCNC: 33 G/DL (ref 32–36)
MCV RBC AUTO: 89 FL (ref 82–98)
MICROSCOPIC COMMENT: ABNORMAL
MONOCYTES # BLD AUTO: ABNORMAL K/UL (ref 0.3–1)
MONOCYTES NFR BLD: 0 % (ref 4–15)
NEUTROPHILS NFR BLD: 31 % (ref 38–73)
NEUTS BAND NFR BLD MANUAL: 1 %
NITRITE UR QL STRIP: POSITIVE
NRBC BLD-RTO: 0 /100 WBC
PH UR STRIP: 6 [PH] (ref 5–8)
PLATELET # BLD AUTO: 200 K/UL (ref 150–450)
PLATELET BLD QL SMEAR: ABNORMAL
PMV BLD AUTO: 10.9 FL (ref 9.2–12.9)
POC IONIZED CALCIUM: 1.19 MMOL/L (ref 1.06–1.42)
POC TCO2 (MEASURED): 29 MMOL/L (ref 23–29)
POTASSIUM BLD-SCNC: 3.6 MMOL/L (ref 3.5–5.1)
POTASSIUM SERPL-SCNC: 3.9 MMOL/L (ref 3.5–5.1)
PROT SERPL-MCNC: 6.6 G/DL (ref 6–8.4)
PROT UR QL STRIP: NEGATIVE
RBC # BLD AUTO: 4.56 M/UL (ref 4–5.4)
RBC #/AREA URNS AUTO: >100 /HPF (ref 0–4)
SAMPLE: ABNORMAL
SODIUM BLD-SCNC: 141 MMOL/L (ref 136–145)
SODIUM SERPL-SCNC: 142 MMOL/L (ref 136–145)
SP GR UR STRIP: 1.02 (ref 1–1.03)
SQUAMOUS #/AREA URNS AUTO: 0 /HPF
URN SPEC COLLECT METH UR: ABNORMAL
WBC # BLD AUTO: 30.62 K/UL (ref 3.9–12.7)
WBC #/AREA URNS AUTO: 14 /HPF (ref 0–5)

## 2022-04-20 PROCEDURE — 93010 ELECTROCARDIOGRAM REPORT: CPT | Mod: ,,, | Performed by: INTERNAL MEDICINE

## 2022-04-20 PROCEDURE — 63600175 PHARM REV CODE 636 W HCPCS: Performed by: PHYSICIAN ASSISTANT

## 2022-04-20 PROCEDURE — 25500020 PHARM REV CODE 255: Performed by: EMERGENCY MEDICINE

## 2022-04-20 PROCEDURE — 99285 EMERGENCY DEPT VISIT HI MDM: CPT | Mod: ,,, | Performed by: EMERGENCY MEDICINE

## 2022-04-20 PROCEDURE — 93005 ELECTROCARDIOGRAM TRACING: CPT

## 2022-04-20 PROCEDURE — 80053 COMPREHEN METABOLIC PANEL: CPT | Performed by: PHYSICIAN ASSISTANT

## 2022-04-20 PROCEDURE — 87077 CULTURE AEROBIC IDENTIFY: CPT | Performed by: PHYSICIAN ASSISTANT

## 2022-04-20 PROCEDURE — 87086 URINE CULTURE/COLONY COUNT: CPT | Performed by: PHYSICIAN ASSISTANT

## 2022-04-20 PROCEDURE — 83690 ASSAY OF LIPASE: CPT | Performed by: PHYSICIAN ASSISTANT

## 2022-04-20 PROCEDURE — 87088 URINE BACTERIA CULTURE: CPT | Performed by: PHYSICIAN ASSISTANT

## 2022-04-20 PROCEDURE — 82330 ASSAY OF CALCIUM: CPT

## 2022-04-20 PROCEDURE — 93010 EKG 12-LEAD: ICD-10-PCS | Mod: ,,, | Performed by: INTERNAL MEDICINE

## 2022-04-20 PROCEDURE — 85007 BL SMEAR W/DIFF WBC COUNT: CPT | Performed by: PHYSICIAN ASSISTANT

## 2022-04-20 PROCEDURE — 99285 EMERGENCY DEPT VISIT HI MDM: CPT | Mod: 25

## 2022-04-20 PROCEDURE — 99285 PR EMERGENCY DEPT VISIT,LEVEL V: ICD-10-PCS | Mod: ,,, | Performed by: EMERGENCY MEDICINE

## 2022-04-20 PROCEDURE — 87186 SC STD MICRODIL/AGAR DIL: CPT | Performed by: PHYSICIAN ASSISTANT

## 2022-04-20 PROCEDURE — 96372 THER/PROPH/DIAG INJ SC/IM: CPT | Mod: 59 | Performed by: PHYSICIAN ASSISTANT

## 2022-04-20 PROCEDURE — 96374 THER/PROPH/DIAG INJ IV PUSH: CPT

## 2022-04-20 PROCEDURE — 81001 URINALYSIS AUTO W/SCOPE: CPT | Performed by: PHYSICIAN ASSISTANT

## 2022-04-20 PROCEDURE — 85027 COMPLETE CBC AUTOMATED: CPT | Performed by: PHYSICIAN ASSISTANT

## 2022-04-20 PROCEDURE — 83605 ASSAY OF LACTIC ACID: CPT | Performed by: EMERGENCY MEDICINE

## 2022-04-20 PROCEDURE — 80047 BASIC METABLC PNL IONIZED CA: CPT

## 2022-04-20 RX ORDER — DICYCLOMINE HYDROCHLORIDE 10 MG/ML
20 INJECTION INTRAMUSCULAR
Status: COMPLETED | OUTPATIENT
Start: 2022-04-20 | End: 2022-04-20

## 2022-04-20 RX ORDER — ONDANSETRON 2 MG/ML
4 INJECTION INTRAMUSCULAR; INTRAVENOUS
Status: COMPLETED | OUTPATIENT
Start: 2022-04-20 | End: 2022-04-20

## 2022-04-20 RX ADMIN — DICYCLOMINE HYDROCHLORIDE 20 MG: 20 INJECTION INTRAMUSCULAR at 09:04

## 2022-04-20 RX ADMIN — IOHEXOL 100 ML: 350 INJECTION, SOLUTION INTRAVENOUS at 10:04

## 2022-04-20 RX ADMIN — ONDANSETRON 4 MG: 2 INJECTION INTRAMUSCULAR; INTRAVENOUS at 09:04

## 2022-04-21 VITALS
TEMPERATURE: 98 F | OXYGEN SATURATION: 98 % | HEART RATE: 75 BPM | DIASTOLIC BLOOD PRESSURE: 50 MMHG | RESPIRATION RATE: 18 BRPM | SYSTOLIC BLOOD PRESSURE: 102 MMHG

## 2022-04-21 RX ORDER — ONDANSETRON 4 MG/1
4 TABLET, ORALLY DISINTEGRATING ORAL EVERY 8 HOURS PRN
Qty: 9 TABLET | Refills: 0 | Status: SHIPPED | OUTPATIENT
Start: 2022-04-21

## 2022-04-21 NOTE — DISCHARGE INSTRUCTIONS
Your labs are similar to baseline.     Your CT scan showed that your right ovary is enlarged. Call your primary care doctor or gynecologist to arrange a follow up ultrasound to further evaluate it.     Return to the ER for anynew or concerning symptoms.

## 2022-04-21 NOTE — ED PROVIDER NOTES
Encounter Date: 2022    SCRIBE #1 NOTE: I, Gabriela Nolan, am scribing for, and in the presence of,  Shea Chavez MD. I have scribed the following portions of the note - Other sections scribed: HPI, ROS.       History     Chief Complaint   Patient presents with    Multiple Complaints     Suddenly started having chills, N/V today. Believes it is food poisoning,, Denies chest pain/SOB     Time patient was seen by the provider: 9:46 PM      The patient is a 81 y.o. female with PMHx including: interstitial cystitis, hysterectomy, memory loss who presents to the ED with a complaint of lower abdominal pain with onset today. She describes the pain as cramping. She also complains of chills. Her friend says that she vomited in the waiting room but no food came up. She also complains of diarrhea but says this is typical for her. Denies dysuria and change in appetite.    The history is provided by medical records, the patient and a friend. No  was used.     Review of patient's allergies indicates:   Allergen Reactions    Compazine [prochlorperazine edisylate]     Sulfa (sulfonamide antibiotics)      Past Medical History:   Diagnosis Date    Adjustment disorder 2012    Depression    ALLERGIC RHINITIS 2012    Allergy     AR (allergic rhinitis) 2012    Arthritis     Cataract     Chronic lymphocytic leukemia     CLL (chronic lymphocytic leukemia)     CMC arthritis, thumb, degenerative 1/15/2015    Colon polyp     Depression     Diverticulosis     Dry eye syndrome     Esophageal reflux     Hearing loss, sensorineural 2015    Hyperlipidemia 2012    Interstitial cystitis 2012    Keloid cicatrix     Major depressive disorder, single episode, mild 10/20/2015    PVD (posterior vitreous detachment)     both eyes    Tinnitus, subjective 2015    Vertigo 10/25/2013     Past Surgical History:   Procedure Laterality Date     SECTION, CLASSIC       X1    CHOLECYSTECTOMY      COLONOSCOPY N/A 10/3/2017    Procedure: COLONOSCOPY;  Surgeon: Kush Ramesh MD;  Location: [facility] ([address] [place] [address]);  Service: Endoscopy;  Laterality: N/A;  Patient requested this day specifically, Dr. Pineda unavailable and pt aware    ganglion cyst removed left foot      HYSTERECTOMY      (AUB), ovaries remain    LIVER BIOPSY      Hemangioma    TONSILLECTOMY       Family History   Problem Relation Age of Onset    Heart disease Mother     Hypertension Mother     Macular degeneration Mother     Cancer Mother         CLL    Dementia Mother     Heart disease Father     Colon cancer Brother     Diabetes Brother     No Known Problems Daughter     COPD Son     No Known Problems Brother     Melanoma Neg Hx     Psoriasis Neg Hx     Lupus Neg Hx     Eczema Neg Hx     Acne Neg Hx     Breast cancer Neg Hx     Ovarian cancer Neg Hx     Blindness Neg Hx     Amblyopia Neg Hx     Cataracts Neg Hx     Glaucoma Neg Hx     Retinal detachment Neg Hx     Strabismus Neg Hx     Stroke Neg Hx     Thyroid disease Neg Hx      Social History     Tobacco Use    Smoking status: Former Smoker     Packs/day: 0.25     Years: 2.00     Pack years: 0.50    Smokeless tobacco: Never Used    Tobacco comment: quit over 20 years ago - smoked pack per week on and off for 10 years   Substance Use Topics    Alcohol use: No     Comment: rare    Drug use: No     Review of Systems   Constitutional: Positive for chills. Negative for appetite change, diaphoresis, fatigue and fever.   HENT: Negative for congestion, rhinorrhea and sore throat.    Eyes: Negative for visual disturbance.   Respiratory: Negative for cough, chest tightness and shortness of breath.    Cardiovascular: Negative for chest pain.   Gastrointestinal: Positive for abdominal pain, diarrhea, nausea and vomiting. Negative for blood in stool and constipation.   Genitourinary: Negative for dysuria, hematuria and urgency.    Musculoskeletal: Negative for back pain.   Skin: Negative for rash.   Neurological: Negative for seizures and syncope.   Hematological: Does not bruise/bleed easily.   Psychiatric/Behavioral: Negative for agitation and hallucinations.       Physical Exam     Initial Vitals [04/20/22 1944]   BP Pulse Resp Temp SpO2   (S) (!) 198/106 70 20 98.1 °F (36.7 °C) 99 %      MAP       --         Physical Exam  Gen: AxOx4, NAD, appears stated age, well appearing  Eye: EOMI, no scleral icterus, no periorbital edema or ecchymosis  Head: Normocephalic, atraumatic, no lesions, scalp grossly normal  ENT: neck supple, no stridor, no masses, no abnormal phonation or drooling  CVS: warm and well perfused, cap refill <2 seconds, no extremity pallor  PULM: normal work of breathing, no stridor, equal chest rise, no peripheral cyanosis  ABD: soft, nontender, nondistended, no organomegaly  Ext: no rash, no deformities, moving all joints with normal ROM  Neuro: RODRIGUEZ, gait intact, face grossly symmetric  Psych: well groomed, mood and affect congruent, makes good eye contact, cooperative    ED Course   Procedures  Labs Reviewed   CBC W/ AUTO DIFFERENTIAL - Abnormal; Notable for the following components:       Result Value    WBC 30.62 (*)     Gran % 31.0 (*)     Lymph % 68.0 (*)     Mono % 0.0 (*)     All other components within normal limits   COMPREHENSIVE METABOLIC PANEL - Abnormal; Notable for the following components:    Glucose 155 (*)     All other components within normal limits   URINALYSIS, REFLEX TO URINE CULTURE - Abnormal; Notable for the following components:    Appearance, UA Hazy (*)     Ketones, UA Trace (*)     Occult Blood UA 3+ (*)     Nitrite, UA Positive (*)     Leukocytes, UA 2+ (*)     All other components within normal limits    Narrative:     Specimen Source->Urine   URINALYSIS MICROSCOPIC - Abnormal; Notable for the following components:    RBC, UA >100 (*)     WBC, UA 14 (*)     Bacteria Many (*)     All other  components within normal limits    Narrative:     Specimen Source->Urine   ISTAT PROCEDURE - Abnormal; Notable for the following components:    POC Glucose 158 (*)     All other components within normal limits   CULTURE, URINE   LIPASE   LACTIC ACID, PLASMA   ISTAT CHEM8          Imaging Results           CT Abdomen Pelvis With Contrast (Final result)  Result time 04/21/22 00:07:12    Final result by Darvin Staples MD (04/21/22 00:07:12)                 Impression:      Mild right hydronephrosis with punctate stone in the mid ureter.    Interval mild enlargement of the right ovary compared to prior study 2016.  Interval follow-up evaluation with nonemergent transvaginal ultrasound.    Prominence of the distal right ovarian vein with multiple calcified right ovarian vein phleboliths appears similar to prior study.    Hepatomegaly.  Previously noted hypoattenuating lesion in hepatic segment 8 appears stable in size and now contains a punctate calcification.    Additional subcentimeter hypodensities in the right hepatic lobe, as above, too small to characterize, appear new from prior study.    Moderate size hiatal hernia, increased in size from prior exam.    Diverticulosis coli without diverticulitis.    Additional findings as above.    This report was flagged in Epic as abnormal.    Electronically signed by resident: Yoli Hendrickson  Date:    04/20/2022  Time:    22:55    Electronically signed by: Darvin Staples MD  Date:    04/21/2022  Time:    00:07             Narrative:    EXAMINATION:  CT ABDOMEN PELVIS WITH CONTRAST    CLINICAL HISTORY:  Abdominal pain, acute, nonlocalized;    TECHNIQUE:  Low dose axial images, sagittal and coronal reformations were obtained from the lung bases to the pubic symphysis following the IV administration of 100 mL of Omnipaque 350.  Oral contrast was not administered.    COMPARISON:  Limited abdominal ultrasound 03/18/2021.  CT abdomen pelvis  12/23/2016.    .    FINDINGS:  Heart: Normal size. No effusion.    Lung Bases: Trace left basilar atelectasis.    Liver: Hepatomegaly.  Subcentimeter hypodensities in the right hepatic lobe (axial series 2, images 23 and 37), too small to characterize, appear new from prior study.  3.2 x 2.7 cm hypoattenuating lesion in hepatic segment 8, stable in size from prior 2016 CT exam and now contains a punctate calcification, new since the 2016 CT prior exam.    Gallbladder: Surgically absent.    Bile Ducts: Mild intrahepatic biliary ductal dilatation, not seen on prior exam.    Pancreas: Stable calcification in the pancreatic tail.  No ductal dilatation, focal mass, or peripancreatic stranding.    Spleen: Unremarkable    Adrenals: Unremarkable    Kidneys/Ureters: Mild right hydronephrosis with punctate stone in the mid ureter (axial series 2, image 84).  Left kidney and ureter are unremarkable.    Bladder: No wall thickening.    Reproductive organs: Uterus is surgically absent.Interval mild enlargement of right ovary (axial series 2, image 113).  Prominence of the distal right ovarian vein with multiple calcified phleboliths, similar to prior study..  Left adnexa is unremarkable.  Multiple calcified phleboliths also noted in the left ovarian vein, also similar to prior.    GI Tract/Mesentery: Moderate size hiatal hernia, increased in size from prior exam.  Diverticulosis coli.  No evidence of bowel obstruction or inflammation. Appendix is normal.    Peritoneal Space: No free intraperitoneal air or fluid.    Retroperitoneum: No significant adenopathy.    Abdominal wall: Small bilateral fat containing inguinal hernias.    Vasculature: No aneurysm.  Mild calcific atherosclerosis of the descending aorta and its branches.    Bones: Stable degenerative changes.  No acute fracture. No suspicious lytic or sclerotic lesions.                                 Medications   ondansetron injection 4 mg (4 mg Intravenous Given 4/20/22  2143)   dicyclomine injection 20 mg (20 mg Intramuscular Given 4/20/22 2143)   iohexoL (OMNIPAQUE 350) injection 100 mL (100 mLs Intravenous Given 4/20/22 2250)     Medical Decision Making:   History:   Old Medical Records: I decided to obtain old medical records.  Initial Assessment:   This is a 81-year-old woman with a history of memory loss, chronic diarrhea who presents with acute onset nausea vomiting and lower abdominal pain in the setting of diarrhea.  No fevers or chills, history is limited due to her memory disorder but corroborated by daughter.  Her exam is notable for lower abdominal pain.  Plan for CT scan and labs to evaluate for possible infectious process versus less likely bowel obstruction, versus urinary etiology.  Clinical Tests:   Lab Tests: Ordered and Reviewed  Radiological Study: Ordered and Reviewed  Medical Tests: Ordered and Reviewed  ED Management:  Labs are notable for hematuria and baseline leukocytosis of unclear significance.  Her CT scan by my independent interpretation is notable for no acute findings, but did note an enlarged right ovary, and I have counseled the patient and her daughter about this at bedside, and the need for further workup.  They voiced understanding.  I have prescribed her Zofran for her symptoms, and instructed them to return if she does not improve after couple of days.  They voiced understanding and she was discharged in good condition.  Patient was able to take p.o. prior to discharge.          Scribe Attestation:   Scribe #1: I performed the above scribed service and the documentation accurately describes the services I performed. I attest to the accuracy of the note.                 Clinical Impression:   Final diagnoses:  [R53.83] Fatigue  [R10.9] Abdominal pain, unspecified abdominal location (Primary)  [R19.7] Diarrhea, unspecified type  [R11.2] Nausea and vomiting, intractability of vomiting not specified, unspecified vomiting type  [Q50.39] Ovarian  anomaly          ED Disposition Condition    Discharge Stable        ED Prescriptions     Medication Sig Dispense Start Date End Date Auth. Provider    ondansetron (ZOFRAN-ODT) 4 MG TbDL Take 1 tablet (4 mg total) by mouth every 8 (eight) hours as needed (nausea). 9 tablet 4/21/2022  Shea Chavez MD        Follow-up Information     Follow up With Specialties Details Why Contact Info    Amirah Flowers MD Internal Medicine Schedule an appointment as soon as possible for a visit   [address]  [place] [address]  [phone]      [facility] - Emergency Dept Emergency Medicine  If symptoms worsen [address] [facility] [id number] [phone]           Shea Chavez MD  04/21/22 0110

## 2022-04-21 NOTE — FIRST PROVIDER EVALUATION
Emergency Department TeleTriage Encounter Note      CHIEF COMPLAINT    Chief Complaint   Patient presents with    Multiple Complaints     Suddenly started having chills, N/V today. Believes it is food poisoning,, Denies chest pain/SOB       VITAL SIGNS   Initial Vitals [04/20/22 1944]   BP Pulse Resp Temp SpO2   (S) (!) 198/106 70 20 98.1 °F (36.7 °C) 99 %      MAP       --            ALLERGIES    Review of patient's allergies indicates:   Allergen Reactions    Compazine [prochlorperazine edisylate]     Sulfa (sulfonamide antibiotics)        PROVIDER TRIAGE NOTE  81-year-old female to the emergency department for evaluation of lower abdominal pain.  Pain began a few hours ago.  Patient reports 1 episode of emesis.  Pain is pretty persistent.  She denies any fevers or chills.      ORDERS  Labs Reviewed - No data to display    ED Orders (720h ago, onward)    Start Ordered     Status Ordering Provider    04/20/22 2130 04/20/22 2116  ondansetron injection 4 mg  ED 1 Time         Ordered RADHA CONNER    04/20/22 2130 04/20/22 2116  dicyclomine injection 20 mg  ED 1 Time         Ordered RADHA CONNER    04/20/22 2117 04/20/22 2116  Vital signs  Every 2 hours         Ordered RADHA CONNER    04/20/22 2115 04/20/22 2116  Diet NPO  Diet effective now         Ordered RADHA CONNER    04/20/22 2115 04/20/22 2116  Insert peripheral IV  Once         Ordered RADHA CONNER    04/20/22 2115 04/20/22 2116  CBC W/ AUTO DIFFERENTIAL  STAT         Ordered RADHA CONNER    04/20/22 2115 04/20/22 2116  Comp. Metabolic Panel  STAT         Ordered RADHA CONNER    04/20/22 2115 04/20/22 2116  ISTAT CHEM8  Once         Ordered RADHA CONNER    04/20/22 2115 04/20/22 2116  Lipase  STAT         Ordered RADHA CONNER    04/20/22 2115 04/20/22 2116  Urinalysis, Reflex to Urine Culture Urine, Clean Catch  STAT         Ordered RADHA CONNER    04/20/22 2115 04/20/22 2116  CT Abdomen Pelvis With  Contrast  1 time imaging         Ordered RADHA CONNER    04/20/22 1948 04/20/22 1947  EKG 12-lead  Once         Completed by ADRIANNE TERAN on 4/20/2022 at  7:58 PM KATY HOWELL            Virtual Visit Note: The provider triage portion of this emergency department evaluation and documentation was performed via [employer], a HIPAA-compliant telemedicine application, in concert with a tele-presenter in the room. A face to face patient evaluation with one of my colleagues will occur once the patient is placed in an emergency department room.      DISCLAIMER: This note was prepared with [employer] voice recognition transcription software. Garbled syntax, mangled pronouns, and other bizarre constructions may be attributed to that software system.

## 2022-04-21 NOTE — ED NOTES
I-STAT Chem-8+ Results:   Value Reference Range   Sodium 141 136-145 mmol/L   Potassium  3.6 3.5-5.1 mmol/L   Chloride 103  mmol/L   Ionized Calcium 1.19 1.06-1.42 mmol/L   CO2 (measured) 29 23-29 mmol/L   Glucose 158  mg/dL   BUN 21 6-30 mg/dL   Creatinine 0.8 0.5-1.4 mg/dL   Hematocrit 39 36-54%

## 2022-04-24 ENCOUNTER — TELEPHONE (OUTPATIENT)
Dept: EMERGENCY MEDICINE | Facility: HOSPITAL | Age: 82
End: 2022-04-24
Payer: MEDICARE

## 2022-04-24 LAB — BACTERIA UR CULT: ABNORMAL

## 2022-04-24 NOTE — TELEPHONE ENCOUNTER
Patient seen in the ED for abdominal pain but no urinary symptoms per ED no.  Not prescribed antibiotics.  I called to discuss these results with her and see how she is feeling but there was no answer.  Voicemail left.

## 2022-04-26 ENCOUNTER — TELEPHONE (OUTPATIENT)
Dept: EMERGENCY MEDICINE | Facility: HOSPITAL | Age: 82
End: 2022-04-26
Payer: MEDICARE

## 2022-04-26 RX ORDER — CEPHALEXIN 500 MG/1
500 CAPSULE ORAL EVERY 12 HOURS
Qty: 14 CAPSULE | Refills: 0 | Status: SHIPPED | OUTPATIENT
Start: 2022-04-26 | End: 2022-05-03

## 2022-04-26 NOTE — TELEPHONE ENCOUNTER
Urine culture from recent ED visit growing E coli.  I called and spoke with patient's daughter who reports that she is feeling better, symptoms resolved.  Unclear if symptoms were due to UTI verses other cause.  Will send prescription for keflex to her pharmacy.

## 2022-06-07 ENCOUNTER — TELEPHONE (OUTPATIENT)
Dept: HEMATOLOGY/ONCOLOGY | Facility: CLINIC | Age: 82
End: 2022-06-07
Payer: MEDICARE

## 2022-06-14 ENCOUNTER — DOCUMENTATION ONLY (OUTPATIENT)
Dept: HEMATOLOGY/ONCOLOGY | Facility: CLINIC | Age: 82
End: 2022-06-14

## 2022-06-14 ENCOUNTER — LAB VISIT (OUTPATIENT)
Dept: LAB | Facility: HOSPITAL | Age: 82
End: 2022-06-14
Attending: INTERNAL MEDICINE
Payer: MEDICARE

## 2022-06-14 ENCOUNTER — OFFICE VISIT (OUTPATIENT)
Dept: HEMATOLOGY/ONCOLOGY | Facility: CLINIC | Age: 82
End: 2022-06-14
Payer: MEDICARE

## 2022-06-14 VITALS
WEIGHT: 135.38 LBS | TEMPERATURE: 98 F | OXYGEN SATURATION: 96 % | BODY MASS INDEX: 23.11 KG/M2 | HEIGHT: 64 IN | DIASTOLIC BLOOD PRESSURE: 60 MMHG | HEART RATE: 63 BPM | SYSTOLIC BLOOD PRESSURE: 127 MMHG

## 2022-06-14 DIAGNOSIS — C91.10 CHRONIC LYMPHOCYTIC LEUKEMIA: ICD-10-CM

## 2022-06-14 DIAGNOSIS — C91.10 CHRONIC LYMPHOCYTIC LEUKEMIA: Primary | ICD-10-CM

## 2022-06-14 LAB
ALBUMIN SERPL BCP-MCNC: 3.8 G/DL (ref 3.5–5.2)
ALP SERPL-CCNC: 75 U/L (ref 55–135)
ALT SERPL W/O P-5'-P-CCNC: 19 U/L (ref 10–44)
ANION GAP SERPL CALC-SCNC: 9 MMOL/L (ref 8–16)
AST SERPL-CCNC: 21 U/L (ref 10–40)
BILIRUB SERPL-MCNC: 0.4 MG/DL (ref 0.1–1)
BUN SERPL-MCNC: 15 MG/DL (ref 8–23)
CALCIUM SERPL-MCNC: 10 MG/DL (ref 8.7–10.5)
CHLORIDE SERPL-SCNC: 100 MMOL/L (ref 95–110)
CO2 SERPL-SCNC: 31 MMOL/L (ref 23–29)
CREAT SERPL-MCNC: 0.8 MG/DL (ref 0.5–1.4)
ERYTHROCYTE [DISTWIDTH] IN BLOOD BY AUTOMATED COUNT: 13.7 % (ref 11.5–14.5)
EST. GFR  (AFRICAN AMERICAN): >60 ML/MIN/1.73 M^2
EST. GFR  (NON AFRICAN AMERICAN): >60 ML/MIN/1.73 M^2
GLUCOSE SERPL-MCNC: 120 MG/DL (ref 70–110)
HCT VFR BLD AUTO: 42.6 % (ref 37–48.5)
HGB BLD-MCNC: 14 G/DL (ref 12–16)
IMM GRANULOCYTES # BLD AUTO: 0.04 K/UL (ref 0–0.04)
LDH SERPL L TO P-CCNC: 163 U/L (ref 110–260)
MCH RBC QN AUTO: 29 PG (ref 27–31)
MCHC RBC AUTO-ENTMCNC: 32.9 G/DL (ref 32–36)
MCV RBC AUTO: 88 FL (ref 82–98)
NEUTROPHILS # BLD AUTO: 6 K/UL (ref 1.8–7.7)
PLATELET # BLD AUTO: 242 K/UL (ref 150–450)
PMV BLD AUTO: 10.1 FL (ref 9.2–12.9)
POTASSIUM SERPL-SCNC: 4.6 MMOL/L (ref 3.5–5.1)
PROT SERPL-MCNC: 6.7 G/DL (ref 6–8.4)
RBC # BLD AUTO: 4.83 M/UL (ref 4–5.4)
SODIUM SERPL-SCNC: 140 MMOL/L (ref 136–145)
WBC # BLD AUTO: 25.11 K/UL (ref 3.9–12.7)

## 2022-06-14 PROCEDURE — 99214 PR OFFICE/OUTPT VISIT, EST, LEVL IV, 30-39 MIN: ICD-10-PCS | Mod: S$GLB,,, | Performed by: NURSE PRACTITIONER

## 2022-06-14 PROCEDURE — 1159F MED LIST DOCD IN RCRD: CPT | Mod: CPTII,S$GLB,, | Performed by: NURSE PRACTITIONER

## 2022-06-14 PROCEDURE — 83615 LACTATE (LD) (LDH) ENZYME: CPT | Performed by: NURSE PRACTITIONER

## 2022-06-14 PROCEDURE — 1101F PR PT FALLS ASSESS DOC 0-1 FALLS W/OUT INJ PAST YR: ICD-10-PCS | Mod: CPTII,S$GLB,, | Performed by: NURSE PRACTITIONER

## 2022-06-14 PROCEDURE — 3078F DIAST BP <80 MM HG: CPT | Mod: CPTII,S$GLB,, | Performed by: NURSE PRACTITIONER

## 2022-06-14 PROCEDURE — 99999 PR PBB SHADOW E&M-EST. PATIENT-LVL III: CPT | Mod: PBBFAC,,, | Performed by: NURSE PRACTITIONER

## 2022-06-14 PROCEDURE — 3074F SYST BP LT 130 MM HG: CPT | Mod: CPTII,S$GLB,, | Performed by: NURSE PRACTITIONER

## 2022-06-14 PROCEDURE — 99214 OFFICE O/P EST MOD 30 MIN: CPT | Mod: S$GLB,,, | Performed by: NURSE PRACTITIONER

## 2022-06-14 PROCEDURE — 36415 COLL VENOUS BLD VENIPUNCTURE: CPT | Performed by: NURSE PRACTITIONER

## 2022-06-14 PROCEDURE — 3078F PR MOST RECENT DIASTOLIC BLOOD PRESSURE < 80 MM HG: ICD-10-PCS | Mod: CPTII,S$GLB,, | Performed by: NURSE PRACTITIONER

## 2022-06-14 PROCEDURE — 3288F PR FALLS RISK ASSESSMENT DOCUMENTED: ICD-10-PCS | Mod: CPTII,S$GLB,, | Performed by: NURSE PRACTITIONER

## 2022-06-14 PROCEDURE — 1126F AMNT PAIN NOTED NONE PRSNT: CPT | Mod: CPTII,S$GLB,, | Performed by: NURSE PRACTITIONER

## 2022-06-14 PROCEDURE — 85027 COMPLETE CBC AUTOMATED: CPT | Performed by: NURSE PRACTITIONER

## 2022-06-14 PROCEDURE — 3288F FALL RISK ASSESSMENT DOCD: CPT | Mod: CPTII,S$GLB,, | Performed by: NURSE PRACTITIONER

## 2022-06-14 PROCEDURE — 3074F PR MOST RECENT SYSTOLIC BLOOD PRESSURE < 130 MM HG: ICD-10-PCS | Mod: CPTII,S$GLB,, | Performed by: NURSE PRACTITIONER

## 2022-06-14 PROCEDURE — 1101F PT FALLS ASSESS-DOCD LE1/YR: CPT | Mod: CPTII,S$GLB,, | Performed by: NURSE PRACTITIONER

## 2022-06-14 PROCEDURE — 1126F PR PAIN SEVERITY QUANTIFIED, NO PAIN PRESENT: ICD-10-PCS | Mod: CPTII,S$GLB,, | Performed by: NURSE PRACTITIONER

## 2022-06-14 PROCEDURE — 1159F PR MEDICATION LIST DOCUMENTED IN MEDICAL RECORD: ICD-10-PCS | Mod: CPTII,S$GLB,, | Performed by: NURSE PRACTITIONER

## 2022-06-14 PROCEDURE — 99999 PR PBB SHADOW E&M-EST. PATIENT-LVL III: ICD-10-PCS | Mod: PBBFAC,,, | Performed by: NURSE PRACTITIONER

## 2022-06-14 PROCEDURE — 80053 COMPREHEN METABOLIC PANEL: CPT | Performed by: NURSE PRACTITIONER

## 2022-06-14 RX ORDER — ARIPIPRAZOLE 5 MG/1
5 TABLET ORAL 2 TIMES DAILY
COMMUNITY
Start: 2022-06-01

## 2022-06-14 RX ORDER — MEMANTINE HYDROCHLORIDE 10 MG/1
10 TABLET ORAL 2 TIMES DAILY
COMMUNITY
Start: 2022-05-25

## 2022-06-14 NOTE — PROGRESS NOTES
Subjective:       Patient ID: Jorge Montgomery is a 81 y.o. female.    Chief Complaint: Follow-up (3 month RTC)    History of Present Illness: Ms. Montgomery is a 81 y.o. who returns in follow up for CLL.      Today, reports overall feeling well.   In the interval, started on abilify and memantine.   Also had UTI in 4/4022 with mental status changes only.  No fevers, chills or recurrent infections. No night sweats.  No weight loss.  No lymphadenopathy.  No bleeding or bruising.  No depression at his time.   No pain  Notes no lymphadenopathy  No urinary complaints today.      She is accompanied by her daughter who is tearful as having a difficult time managing mom's care. Looking for sitter and having home issues as mom's bathroom on 2nd floor.          Review of Systems   Constitutional: Negative for appetite change and unexpected weight change.   HENT: Negative for nasal congestion, postnasal drip, sinus pressure/congestion, sore throat and trouble swallowing.    Eyes: Negative for visual disturbance.   Respiratory: Negative for cough and shortness of breath.    Cardiovascular: Negative for chest pain, palpitations and leg swelling.   Gastrointestinal: Negative for abdominal distention, abdominal pain, blood in stool, change in bowel habit, constipation, diarrhea (rare with diet), nausea, vomiting, reflux and change in bowel habit.   Genitourinary: Negative for decreased urine volume, difficulty urinating, dysuria, frequency and urgency.   Musculoskeletal: Negative for back pain.   Integumentary:  Negative for rash.   Neurological: Negative for headaches.   Hematological: Negative for adenopathy. Does not bruise/bleed easily.   Psychiatric/Behavioral: The patient is not nervous/anxious.          Objective:      Physical Exam  Vitals and nursing note reviewed.   Constitutional:       General: She is not in acute distress.     Appearance: Normal appearance. She is well-developed and normal weight. She is not  diaphoretic.      Comments: Presents with her daughter  ECOG=0  Very pleasant   HENT:      Head: Normocephalic and atraumatic.   Eyes:      General: No scleral icterus.     Extraocular Movements: Extraocular movements intact.      Conjunctiva/sclera: Conjunctivae normal.      Pupils: Pupils are equal, round, and reactive to light.   Neck:      Thyroid: No thyromegaly.   Cardiovascular:      Rate and Rhythm: Normal rate and regular rhythm.      Heart sounds: Normal heart sounds. No murmur heard.    No friction rub. No gallop.   Pulmonary:      Effort: Pulmonary effort is normal. No respiratory distress.      Breath sounds: Normal breath sounds. No wheezing, rhonchi or rales.   Chest:   Breasts:      Right: No axillary adenopathy.      Left: No axillary adenopathy.       Abdominal:      General: Abdomen is flat. Bowel sounds are normal. There is no distension.      Palpations: Abdomen is soft. There is no mass.      Tenderness: There is no abdominal tenderness. There is no guarding or rebound.      Comments: No hepatosplenomegaly   Musculoskeletal:         General: No swelling, tenderness or deformity. Normal range of motion.      Cervical back: Normal range of motion and neck supple.      Right lower leg: No edema.      Left lower leg: No edema.   Lymphadenopathy:      Head:      Right side of head: No submandibular, preauricular, posterior auricular or occipital adenopathy.      Left side of head: No submandibular, preauricular, posterior auricular or occipital adenopathy.      Cervical: No cervical adenopathy.      Right cervical: No superficial, deep or posterior cervical adenopathy.     Left cervical: No superficial, deep or posterior cervical adenopathy.      Upper Body:      Right upper body: No axillary adenopathy.      Left upper body: No axillary adenopathy.      Lower Body: No right inguinal adenopathy. No left inguinal adenopathy.   Skin:     General: Skin is warm and dry.      Coloration: Skin is not  jaundiced or pale.      Findings: No bruising, erythema, lesion or rash.   Neurological:      General: No focal deficit present.      Mental Status: She is alert and oriented to person, place, and time.      Sensory: No sensory deficit.      Motor: No weakness.      Coordination: Coordination normal.      Gait: Gait normal.   Psychiatric:         Mood and Affect: Mood normal.         Behavior: Behavior normal.         Thought Content: Thought content normal.         Judgment: Judgment normal.       Labs- reviewed  Assessment:       Problem List Items Addressed This Visit        Oncology    Chronic lymphocytic leukemia - Primary    Relevant Orders    CBC Oncology    Comprehensive Metabolic Panel    Lactate Dehydrogenase          Plan:         Does not meet lab parameters or clinical indications to treat.  Knows indications for which to call.  Advise to have regular UA for screening- next one to be done with PCP in July.   Will have  reach out to possibly help with home situation.   RTC 4 months with labs to see Dr. Merchant.    Route Chart for Scheduling    Med Onc Chart Routing      Follow up with physician 4 months.   Follow up with [facility]    Labs CBC, CMP and LDH   Lab interval:     Imaging    Pharmacy appointment    Other referrals             Patient is in agreement with the proposed treatment plan. All questions were answered to the patient's satisfaction. Pt knows to call clinic for any new or worsening symptoms and if anything is needed before the next clinic visit.      JOSE Pantoja-LACI  Hematology & Oncology  [facility] [place] [address]  ph. [phone]  Fax. [phone]     Face to Face time with patient: 30 minutes  35 minutes of total time spent on the encounter, which includes face to face time and non-face to face time preparing to see the patient (eg, review of tests), Obtaining and/or reviewing separately obtained history, Documenting clinical information in the  electronic or other health record, Independently interpreting results (not separately reported) and communicating results to the patient/family/caregiver, or Care coordination (not separately reported).

## 2022-06-14 NOTE — PROGRESS NOTES
In response to in basket message from ANGELA Felix NP noting pt's daughter may need guidance in finding sitter, SW called daughter.  She discussed many psychosocial stressors including their sitter is demanding to be paid 4 mos in advance, her mother has alzheimers, doesn't have bathroom downstairs so they have purchased a new, more appropriate home and her mother is very unhappy with this, etc. Pt said it helped her to be able to discuss these things. She denied SI/HI and expressed gratitude for the call.  SW provided name and contact information and encouraged pt to call with any future needs.       No acute events thus far overnight. Pt reporting pain controlled with rest, repositioning, PRN pain medications, and ice therapy. Repositions self in bed. Gagnon remains in place at this time, will remove in AM per order. Pt denies nausea, tolerating PO. Denies other needs at this time. Call light is in reach. Bed alarm is engaged.

## 2022-06-14 NOTE — Clinical Note
Hi! This patient we follow for CLL which has been stable. The daughter was tearful today as difficulty finding sitter. I told her I would reach out to you to see if there is anything you can help her with.  Thank you so much,  PJ

## 2022-07-19 ENCOUNTER — PATIENT MESSAGE (OUTPATIENT)
Dept: RESEARCH | Facility: CLINIC | Age: 82
End: 2022-07-19
Payer: MEDICARE

## 2022-10-27 ENCOUNTER — TELEPHONE (OUTPATIENT)
Dept: HEMATOLOGY/ONCOLOGY | Facility: CLINIC | Age: 82
End: 2022-10-27
Payer: MEDICARE

## 2022-10-27 NOTE — TELEPHONE ENCOUNTER
"----- Message from Cici Arechiga sent at 10/27/2022  8:35 AM CDT -----  Consult/Advisory:           Name Of Caller: daughter    Contact Preference?: [phone]    What is the nature of the call?: requesting a call back to advise if they should continue coming in being that her Alzheimer s advancing           Additional Notes:  "Thank you for all that you do for our patients'"     "

## 2023-07-12 ENCOUNTER — TELEPHONE (OUTPATIENT)
Dept: HEMATOLOGY/ONCOLOGY | Facility: CLINIC | Age: 83
End: 2023-07-12
Payer: MEDICARE

## 2023-07-12 NOTE — TELEPHONE ENCOUNTER
----- Message from Ritchie Roger sent at 7/11/2023  3:14 PM CDT -----  Regarding: New  ref Dx CLL

## 2023-07-14 ENCOUNTER — TELEPHONE (OUTPATIENT)
Dept: HEMATOLOGY/ONCOLOGY | Facility: CLINIC | Age: 83
End: 2023-07-14
Payer: MEDICARE

## 2023-07-14 NOTE — TELEPHONE ENCOUNTER
----- Message from Parisa Roldan sent at 7/13/2023  3:01 PM CDT -----  Hey, referring office called to inform they made a mistake and her caregiver Yoli can be contacted at [phone]

## 2023-07-17 ENCOUNTER — TELEPHONE (OUTPATIENT)
Dept: HEMATOLOGY/ONCOLOGY | Facility: CLINIC | Age: 83
End: 2023-07-17
Payer: MEDICARE

## 2023-07-17 NOTE — TELEPHONE ENCOUNTER
----- Message from Tremontana Chevalier sent at 7/17/2023  8:52 AM CDT -----  Regarding: pt advice  Mireille pt's  clling to spk with GALLO Donahue to anton pt's appt. Caller adrián pt needs after 9 am any day. Pls call Mireille @ [phone].

## 2023-09-18 ENCOUNTER — OFFICE VISIT (OUTPATIENT)
Dept: HEMATOLOGY/ONCOLOGY | Facility: CLINIC | Age: 83
End: 2023-09-18
Payer: MEDICARE

## 2023-09-18 ENCOUNTER — LAB VISIT (OUTPATIENT)
Dept: LAB | Facility: HOSPITAL | Age: 83
End: 2023-09-18
Attending: INTERNAL MEDICINE
Payer: MEDICARE

## 2023-09-18 VITALS
RESPIRATION RATE: 17 BRPM | BODY MASS INDEX: 26.27 KG/M2 | OXYGEN SATURATION: 97 % | HEART RATE: 73 BPM | WEIGHT: 153.88 LBS | DIASTOLIC BLOOD PRESSURE: 67 MMHG | HEIGHT: 64 IN | SYSTOLIC BLOOD PRESSURE: 142 MMHG | TEMPERATURE: 97 F

## 2023-09-18 DIAGNOSIS — R41.3 MEMORY LOSS: ICD-10-CM

## 2023-09-18 DIAGNOSIS — C91.10 CHRONIC LYMPHOCYTIC LEUKEMIA: Primary | ICD-10-CM

## 2023-09-18 DIAGNOSIS — C91.10 CHRONIC LYMPHOCYTIC LEUKEMIA: ICD-10-CM

## 2023-09-18 LAB
ALBUMIN SERPL BCP-MCNC: 3.8 G/DL (ref 3.5–5.2)
ALBUMIN SERPL BCP-MCNC: 3.8 G/DL (ref 3.5–5.2)
ALP SERPL-CCNC: 89 U/L (ref 55–135)
ALP SERPL-CCNC: 89 U/L (ref 55–135)
ALT SERPL W/O P-5'-P-CCNC: 20 U/L (ref 10–44)
ALT SERPL W/O P-5'-P-CCNC: 20 U/L (ref 10–44)
ANION GAP SERPL CALC-SCNC: 9 MMOL/L (ref 8–16)
ANION GAP SERPL CALC-SCNC: 9 MMOL/L (ref 8–16)
AST SERPL-CCNC: 26 U/L (ref 10–40)
AST SERPL-CCNC: 26 U/L (ref 10–40)
BASOPHILS NFR BLD: 0 % (ref 0–1.9)
BASOPHILS NFR BLD: 0 % (ref 0–1.9)
BILIRUB SERPL-MCNC: 0.6 MG/DL (ref 0.1–1)
BILIRUB SERPL-MCNC: 0.6 MG/DL (ref 0.1–1)
BUN SERPL-MCNC: 13 MG/DL (ref 8–23)
BUN SERPL-MCNC: 13 MG/DL (ref 8–23)
CALCIUM SERPL-MCNC: 9.6 MG/DL (ref 8.7–10.5)
CALCIUM SERPL-MCNC: 9.6 MG/DL (ref 8.7–10.5)
CHLORIDE SERPL-SCNC: 102 MMOL/L (ref 95–110)
CHLORIDE SERPL-SCNC: 102 MMOL/L (ref 95–110)
CO2 SERPL-SCNC: 29 MMOL/L (ref 23–29)
CO2 SERPL-SCNC: 29 MMOL/L (ref 23–29)
CREAT SERPL-MCNC: 1 MG/DL (ref 0.5–1.4)
CREAT SERPL-MCNC: 1 MG/DL (ref 0.5–1.4)
DIFFERENTIAL METHOD: ABNORMAL
DIFFERENTIAL METHOD: ABNORMAL
EOSINOPHIL NFR BLD: 0 % (ref 0–8)
EOSINOPHIL NFR BLD: 0 % (ref 0–8)
ERYTHROCYTE [DISTWIDTH] IN BLOOD BY AUTOMATED COUNT: 13.2 % (ref 11.5–14.5)
ERYTHROCYTE [DISTWIDTH] IN BLOOD BY AUTOMATED COUNT: 13.2 % (ref 11.5–14.5)
EST. GFR  (NO RACE VARIABLE): 57 ML/MIN/1.73 M^2
EST. GFR  (NO RACE VARIABLE): 57 ML/MIN/1.73 M^2
GLUCOSE SERPL-MCNC: 110 MG/DL (ref 70–110)
GLUCOSE SERPL-MCNC: 110 MG/DL (ref 70–110)
HCT VFR BLD AUTO: 44.4 % (ref 37–48.5)
HCT VFR BLD AUTO: 44.4 % (ref 37–48.5)
HGB BLD-MCNC: 14 G/DL (ref 12–16)
HGB BLD-MCNC: 14 G/DL (ref 12–16)
IMM GRANULOCYTES # BLD AUTO: ABNORMAL K/UL (ref 0–0.04)
IMM GRANULOCYTES # BLD AUTO: ABNORMAL K/UL (ref 0–0.04)
IMM GRANULOCYTES NFR BLD AUTO: ABNORMAL % (ref 0–0.5)
IMM GRANULOCYTES NFR BLD AUTO: ABNORMAL % (ref 0–0.5)
LDH SERPL L TO P-CCNC: 181 U/L (ref 110–260)
LDH SERPL L TO P-CCNC: 181 U/L (ref 110–260)
LYMPHOCYTES NFR BLD: 87 % (ref 18–48)
LYMPHOCYTES NFR BLD: 87 % (ref 18–48)
MCH RBC QN AUTO: 28.7 PG (ref 27–31)
MCH RBC QN AUTO: 28.7 PG (ref 27–31)
MCHC RBC AUTO-ENTMCNC: 31.5 G/DL (ref 32–36)
MCHC RBC AUTO-ENTMCNC: 31.5 G/DL (ref 32–36)
MCV RBC AUTO: 91 FL (ref 82–98)
MCV RBC AUTO: 91 FL (ref 82–98)
MONOCYTES NFR BLD: 2 % (ref 4–15)
MONOCYTES NFR BLD: 2 % (ref 4–15)
NEUTROPHILS NFR BLD: 11 % (ref 38–73)
NEUTROPHILS NFR BLD: 11 % (ref 38–73)
NRBC BLD-RTO: 0 /100 WBC
NRBC BLD-RTO: 0 /100 WBC
PLATELET # BLD AUTO: 266 K/UL (ref 150–450)
PLATELET # BLD AUTO: 266 K/UL (ref 150–450)
PLATELET BLD QL SMEAR: ABNORMAL
PLATELET BLD QL SMEAR: ABNORMAL
PMV BLD AUTO: 10.2 FL (ref 9.2–12.9)
PMV BLD AUTO: 10.2 FL (ref 9.2–12.9)
POTASSIUM SERPL-SCNC: 4 MMOL/L (ref 3.5–5.1)
POTASSIUM SERPL-SCNC: 4 MMOL/L (ref 3.5–5.1)
PROT SERPL-MCNC: 6.6 G/DL (ref 6–8.4)
PROT SERPL-MCNC: 6.6 G/DL (ref 6–8.4)
RBC # BLD AUTO: 4.87 M/UL (ref 4–5.4)
RBC # BLD AUTO: 4.87 M/UL (ref 4–5.4)
SMUDGE CELLS BLD QL SMEAR: PRESENT
SMUDGE CELLS BLD QL SMEAR: PRESENT
SODIUM SERPL-SCNC: 140 MMOL/L (ref 136–145)
SODIUM SERPL-SCNC: 140 MMOL/L (ref 136–145)
WBC # BLD AUTO: 35.56 K/UL (ref 3.9–12.7)
WBC # BLD AUTO: 35.56 K/UL (ref 3.9–12.7)

## 2023-09-18 PROCEDURE — 3288F PR FALLS RISK ASSESSMENT DOCUMENTED: ICD-10-PCS | Mod: CPTII,S$GLB,, | Performed by: INTERNAL MEDICINE

## 2023-09-18 PROCEDURE — 85007 BL SMEAR W/DIFF WBC COUNT: CPT | Performed by: INTERNAL MEDICINE

## 2023-09-18 PROCEDURE — 1126F PR PAIN SEVERITY QUANTIFIED, NO PAIN PRESENT: ICD-10-PCS | Mod: CPTII,S$GLB,, | Performed by: INTERNAL MEDICINE

## 2023-09-18 PROCEDURE — 1159F PR MEDICATION LIST DOCUMENTED IN MEDICAL RECORD: ICD-10-PCS | Mod: CPTII,S$GLB,, | Performed by: INTERNAL MEDICINE

## 2023-09-18 PROCEDURE — 3078F PR MOST RECENT DIASTOLIC BLOOD PRESSURE < 80 MM HG: ICD-10-PCS | Mod: CPTII,S$GLB,, | Performed by: INTERNAL MEDICINE

## 2023-09-18 PROCEDURE — 99214 PR OFFICE/OUTPT VISIT, EST, LEVL IV, 30-39 MIN: ICD-10-PCS | Mod: S$GLB,,, | Performed by: INTERNAL MEDICINE

## 2023-09-18 PROCEDURE — 83615 LACTATE (LD) (LDH) ENZYME: CPT | Performed by: INTERNAL MEDICINE

## 2023-09-18 PROCEDURE — 3077F PR MOST RECENT SYSTOLIC BLOOD PRESSURE >= 140 MM HG: ICD-10-PCS | Mod: CPTII,S$GLB,, | Performed by: INTERNAL MEDICINE

## 2023-09-18 PROCEDURE — 1101F PT FALLS ASSESS-DOCD LE1/YR: CPT | Mod: CPTII,S$GLB,, | Performed by: INTERNAL MEDICINE

## 2023-09-18 PROCEDURE — 80053 COMPREHEN METABOLIC PANEL: CPT | Performed by: INTERNAL MEDICINE

## 2023-09-18 PROCEDURE — 3288F FALL RISK ASSESSMENT DOCD: CPT | Mod: CPTII,S$GLB,, | Performed by: INTERNAL MEDICINE

## 2023-09-18 PROCEDURE — 1126F AMNT PAIN NOTED NONE PRSNT: CPT | Mod: CPTII,S$GLB,, | Performed by: INTERNAL MEDICINE

## 2023-09-18 PROCEDURE — 99214 OFFICE O/P EST MOD 30 MIN: CPT | Mod: S$GLB,,, | Performed by: INTERNAL MEDICINE

## 2023-09-18 PROCEDURE — 1101F PR PT FALLS ASSESS DOC 0-1 FALLS W/OUT INJ PAST YR: ICD-10-PCS | Mod: CPTII,S$GLB,, | Performed by: INTERNAL MEDICINE

## 2023-09-18 PROCEDURE — 99999 PR PBB SHADOW E&M-EST. PATIENT-LVL III: ICD-10-PCS | Mod: PBBFAC,,, | Performed by: INTERNAL MEDICINE

## 2023-09-18 PROCEDURE — 1160F RVW MEDS BY RX/DR IN RCRD: CPT | Mod: CPTII,S$GLB,, | Performed by: INTERNAL MEDICINE

## 2023-09-18 PROCEDURE — 1160F PR REVIEW ALL MEDS BY PRESCRIBER/CLIN PHARMACIST DOCUMENTED: ICD-10-PCS | Mod: CPTII,S$GLB,, | Performed by: INTERNAL MEDICINE

## 2023-09-18 PROCEDURE — 36415 COLL VENOUS BLD VENIPUNCTURE: CPT | Performed by: INTERNAL MEDICINE

## 2023-09-18 PROCEDURE — 1159F MED LIST DOCD IN RCRD: CPT | Mod: CPTII,S$GLB,, | Performed by: INTERNAL MEDICINE

## 2023-09-18 PROCEDURE — 3077F SYST BP >= 140 MM HG: CPT | Mod: CPTII,S$GLB,, | Performed by: INTERNAL MEDICINE

## 2023-09-18 PROCEDURE — 99999 PR PBB SHADOW E&M-EST. PATIENT-LVL III: CPT | Mod: PBBFAC,,, | Performed by: INTERNAL MEDICINE

## 2023-09-18 PROCEDURE — 3078F DIAST BP <80 MM HG: CPT | Mod: CPTII,S$GLB,, | Performed by: INTERNAL MEDICINE

## 2023-09-18 PROCEDURE — 85027 COMPLETE CBC AUTOMATED: CPT | Performed by: INTERNAL MEDICINE

## 2023-09-18 NOTE — PROGRESS NOTES
Subjective     Patient ID: Jorge Montgomery is a 82 y.o. female.    Chief Complaint: Chronic lymphocytic leukemia    HPI    Presents today with Funmilayo, a   Reports getting a new puppy    In the interval states:  Reports eating and drinking well  Weight has been ok    Good energy level  No fevers, chills or infectious complaints  No night sweats  Denies recurrent infections    Right hip pain - post injections and feeling better    24 hour care at home x 6 months    Review of Systems   Constitutional:  Negative for activity change, appetite change, chills, fatigue, fever and unexpected weight change.   HENT:  Negative for nasal congestion, postnasal drip, sinus pressure/congestion, sore throat and trouble swallowing.    Eyes:  Negative for visual disturbance.   Respiratory:  Negative for cough and shortness of breath.    Cardiovascular:  Negative for chest pain, palpitations and leg swelling.   Gastrointestinal:  Negative for abdominal distention, abdominal pain, blood in stool, change in bowel habit, constipation, diarrhea (rare with diet), nausea, vomiting, reflux and change in bowel habit.   Genitourinary:  Negative for decreased urine volume, difficulty urinating, dysuria, frequency and urgency.   Musculoskeletal:  Negative for arthralgias, back pain, joint swelling, myalgias and joint deformity.   Integumentary:  Negative for rash.   Neurological:  Negative for dizziness, weakness, numbness and headaches.   Hematological:  Negative for adenopathy. Does not bruise/bleed easily.   Psychiatric/Behavioral:  Positive for confusion. Negative for decreased concentration, dysphoric mood and sleep disturbance. The patient is not nervous/anxious.           Objective     Physical Exam  Vitals and nursing note reviewed.   Constitutional:       General: She is not in acute distress.     Appearance: Normal appearance. She is well-developed and normal weight. She is not ill-appearing.      Comments: Presents  with her   ECOG=0  Very pleasant   HENT:      Head: Normocephalic and atraumatic.   Eyes:      General: No scleral icterus.     Extraocular Movements: Extraocular movements intact.      Conjunctiva/sclera: Conjunctivae normal.      Pupils: Pupils are equal, round, and reactive to light.   Neck:      Thyroid: No thyromegaly.   Cardiovascular:      Rate and Rhythm: Normal rate and regular rhythm.      Heart sounds: Normal heart sounds. No murmur heard.     No friction rub. No gallop.   Pulmonary:      Effort: Pulmonary effort is normal. No respiratory distress.      Breath sounds: Normal breath sounds. No wheezing, rhonchi or rales.   Abdominal:      General: Abdomen is flat. Bowel sounds are normal. There is no distension.      Palpations: Abdomen is soft. There is no mass.      Tenderness: There is no abdominal tenderness. There is no guarding or rebound.      Comments: No hepatosplenomegaly   Musculoskeletal:         General: No swelling, tenderness or deformity. Normal range of motion.      Cervical back: Normal range of motion and neck supple.      Right lower leg: No edema.      Left lower leg: No edema.   Lymphadenopathy:      Head:      Right side of head: No submandibular, preauricular, posterior auricular or occipital adenopathy.      Left side of head: No submandibular, preauricular, posterior auricular or occipital adenopathy.      Cervical: No cervical adenopathy.      Right cervical: No superficial, deep or posterior cervical adenopathy.     Left cervical: No superficial, deep or posterior cervical adenopathy.      Upper Body:      Right upper body: No supraclavicular adenopathy.      Left upper body: No supraclavicular adenopathy.      Lower Body: No right inguinal adenopathy. No left inguinal adenopathy.   Skin:     General: Skin is warm and dry.      Coloration: Skin is not jaundiced or pale.      Findings: No bruising, erythema, lesion or rash.   Neurological:      General: No focal  deficit present.      Mental Status: She is alert and oriented to person, place, and time.      Sensory: No sensory deficit.      Motor: No weakness.      Coordination: Coordination normal.      Gait: Gait normal.   Psychiatric:         Mood and Affect: Mood normal.         Behavior: Behavior normal.         Judgment: Judgment normal.      Comments: Has support with           Assessment and Plan     1. Chronic lymphocytic leukemia  -     CBC W/ AUTO DIFFERENTIAL; Future; Expected date: 09/18/2023  -     CMP; Future; Expected date: 09/18/2023  -     LACTATE DEHYDROGENASE; Future; Expected date: 09/18/2023    2. Memory loss      CLL  Clinically stable  Check labs today  She has historically not required treatment  Only needs treatment if wbc rapidly doubling, or significant anemia or thrombocytopenia noted, or recurrent infections or bulky lymphadenopathy    Memory loss- follows with neurology    Route Chart for Scheduling    Med Onc Chart Routing      Follow up with physician 4 months. cbc, cmp, ldh and EP   Follow up with [facility]    Infusion scheduling note    Injection scheduling note    Labs    Imaging    Pharmacy appointment    Other referrals

## 2024-01-05 ENCOUNTER — TELEPHONE (OUTPATIENT)
Dept: HEMATOLOGY/ONCOLOGY | Facility: CLINIC | Age: 84
End: 2024-01-05
Payer: MEDICARE

## 2024-01-05 DIAGNOSIS — C91.10 CHRONIC LYMPHOCYTIC LEUKEMIA: Primary | ICD-10-CM

## 2024-01-05 NOTE — TELEPHONE ENCOUNTER
"Got patient scheduled with Dina Patrick at the end of February per request.         ----- Message from Cely Edwards sent at 1/5/2024  1:38 PM CST -----  Regarding: scheduling  This patient has a recall to be scheduled in January for: Follow up with physician 4 months. cbc, cmp, ldh and EP. Are you able to fit her in or help schedule her?    Jere Rai    ----- Message -----  From: Eve Ibarra  Sent: 1/5/2024   1:02 PM CST  To: [facility]  Pool    Scheduling Request       Patient Status: est       Scheduling Appt: f/u       Time/Date Preference: soonest available;  requesting call to schedule        Relationship to Patient?: Yoli (Pt's )       Contact Preference?:[phone]       Treating Provider: Mayur       Do you feel you need to be seen today? No           Additional Notes:  "Thank you for all that you do for our patients"               "

## 2024-02-26 ENCOUNTER — OFFICE VISIT (OUTPATIENT)
Dept: HEMATOLOGY/ONCOLOGY | Facility: CLINIC | Age: 84
End: 2024-02-26
Payer: MEDICARE

## 2024-02-26 ENCOUNTER — LAB VISIT (OUTPATIENT)
Dept: LAB | Facility: HOSPITAL | Age: 84
End: 2024-02-26
Attending: INTERNAL MEDICINE
Payer: MEDICARE

## 2024-02-26 VITALS
BODY MASS INDEX: 25.73 KG/M2 | DIASTOLIC BLOOD PRESSURE: 80 MMHG | WEIGHT: 150.69 LBS | OXYGEN SATURATION: 96 % | HEIGHT: 64 IN | RESPIRATION RATE: 16 BRPM | SYSTOLIC BLOOD PRESSURE: 124 MMHG | HEART RATE: 82 BPM

## 2024-02-26 DIAGNOSIS — R73.9 HYPERGLYCEMIA: ICD-10-CM

## 2024-02-26 DIAGNOSIS — R41.3 MEMORY LOSS: ICD-10-CM

## 2024-02-26 DIAGNOSIS — R45.89 ANXIETY ABOUT HEALTH: ICD-10-CM

## 2024-02-26 DIAGNOSIS — C91.10 CHRONIC LYMPHOCYTIC LEUKEMIA: Primary | ICD-10-CM

## 2024-02-26 DIAGNOSIS — C91.10 CHRONIC LYMPHOCYTIC LEUKEMIA: ICD-10-CM

## 2024-02-26 LAB
ALBUMIN SERPL BCP-MCNC: 3.8 G/DL (ref 3.5–5.2)
ALP SERPL-CCNC: 86 U/L (ref 55–135)
ALT SERPL W/O P-5'-P-CCNC: 20 U/L (ref 10–44)
ANION GAP SERPL CALC-SCNC: 14 MMOL/L (ref 8–16)
AST SERPL-CCNC: 22 U/L (ref 10–40)
BASOPHILS NFR BLD: 0 % (ref 0–1.9)
BILIRUB SERPL-MCNC: 0.7 MG/DL (ref 0.1–1)
BUN SERPL-MCNC: 16 MG/DL (ref 8–23)
CALCIUM SERPL-MCNC: 9.7 MG/DL (ref 8.7–10.5)
CHLORIDE SERPL-SCNC: 100 MMOL/L (ref 95–110)
CO2 SERPL-SCNC: 29 MMOL/L (ref 23–29)
CREAT SERPL-MCNC: 1 MG/DL (ref 0.5–1.4)
DIFFERENTIAL METHOD BLD: ABNORMAL
EOSINOPHIL NFR BLD: 0 % (ref 0–8)
ERYTHROCYTE [DISTWIDTH] IN BLOOD BY AUTOMATED COUNT: 14.4 % (ref 11.5–14.5)
EST. GFR  (NO RACE VARIABLE): 56.6 ML/MIN/1.73 M^2
GLUCOSE SERPL-MCNC: 196 MG/DL (ref 70–110)
HCT VFR BLD AUTO: 43.2 % (ref 37–48.5)
HGB BLD-MCNC: 14 G/DL (ref 12–16)
IMM GRANULOCYTES # BLD AUTO: ABNORMAL K/UL (ref 0–0.04)
IMM GRANULOCYTES NFR BLD AUTO: ABNORMAL % (ref 0–0.5)
LDH SERPL L TO P-CCNC: 190 U/L (ref 110–260)
LYMPHOCYTES NFR BLD: 75 % (ref 18–48)
MCH RBC QN AUTO: 29.1 PG (ref 27–31)
MCHC RBC AUTO-ENTMCNC: 32.4 G/DL (ref 32–36)
MCV RBC AUTO: 90 FL (ref 82–98)
MONOCYTES NFR BLD: 2 % (ref 4–15)
NEUTROPHILS NFR BLD: 23 % (ref 38–73)
NRBC BLD-RTO: 0 /100 WBC
PLATELET # BLD AUTO: 244 K/UL (ref 150–450)
PLATELET BLD QL SMEAR: ABNORMAL
PMV BLD AUTO: 10.9 FL (ref 9.2–12.9)
POTASSIUM SERPL-SCNC: 3.7 MMOL/L (ref 3.5–5.1)
PROT SERPL-MCNC: 6.6 G/DL (ref 6–8.4)
RBC # BLD AUTO: 4.81 M/UL (ref 4–5.4)
SMUDGE CELLS BLD QL SMEAR: PRESENT
SODIUM SERPL-SCNC: 143 MMOL/L (ref 136–145)
WBC # BLD AUTO: 37.69 K/UL (ref 3.9–12.7)

## 2024-02-26 PROCEDURE — 99999 PR PBB SHADOW E&M-EST. PATIENT-LVL III: CPT | Mod: PBBFAC,,, | Performed by: NURSE PRACTITIONER

## 2024-02-26 PROCEDURE — 80053 COMPREHEN METABOLIC PANEL: CPT | Performed by: INTERNAL MEDICINE

## 2024-02-26 PROCEDURE — 99214 OFFICE O/P EST MOD 30 MIN: CPT | Mod: S$GLB,,, | Performed by: NURSE PRACTITIONER

## 2024-02-26 PROCEDURE — 36415 COLL VENOUS BLD VENIPUNCTURE: CPT | Performed by: INTERNAL MEDICINE

## 2024-02-26 PROCEDURE — 85027 COMPLETE CBC AUTOMATED: CPT | Performed by: INTERNAL MEDICINE

## 2024-02-26 PROCEDURE — 83615 LACTATE (LD) (LDH) ENZYME: CPT | Performed by: INTERNAL MEDICINE

## 2024-02-26 PROCEDURE — 85060 BLOOD SMEAR INTERPRETATION: CPT | Mod: ,,, | Performed by: PATHOLOGY

## 2024-02-26 PROCEDURE — 85007 BL SMEAR W/DIFF WBC COUNT: CPT | Performed by: INTERNAL MEDICINE

## 2024-02-26 RX ORDER — SERTRALINE HYDROCHLORIDE 100 MG/1
100 TABLET, FILM COATED ORAL
COMMUNITY

## 2024-02-26 RX ORDER — RISPERIDONE 0.5 MG/1
0.5 TABLET ORAL NIGHTLY
COMMUNITY
Start: 2024-02-21

## 2024-02-26 RX ORDER — ERGOCALCIFEROL 1.25 MG/1
50000 CAPSULE ORAL
COMMUNITY
Start: 2024-02-06

## 2024-02-26 RX ORDER — ASPIRIN 81 MG/1
81 TABLET ORAL EVERY MORNING
COMMUNITY

## 2024-02-26 RX ORDER — VENLAFAXINE HYDROCHLORIDE 150 MG/1
150 CAPSULE, EXTENDED RELEASE ORAL EVERY MORNING
COMMUNITY

## 2024-02-26 RX ORDER — ATORVASTATIN CALCIUM 10 MG/1
10 TABLET, FILM COATED ORAL
COMMUNITY

## 2024-02-26 RX ORDER — BUSPIRONE HYDROCHLORIDE 15 MG/1
15 TABLET ORAL 2 TIMES DAILY
COMMUNITY
Start: 2024-02-06

## 2024-02-26 NOTE — PROGRESS NOTES
Subjective     Patient ID: Jorge Montgomery is a 83 y.o. female.    Chief Complaint: Chronic lymphocytic leukemia    HPI      Presents for a follow up - presents with Yoli WEINSTEIN.   Patient lives at home but has 24 hour care.   Was not able to keep the puppy.   Had covid 2 weeks.   Cognitive status not back to baseline- more anxiety and wandering.   Saw Psych recently and adjusted medications- now on Risperdal  Wants to sleep more sicne having covid. .   Next f/u with psych in April but may need ot get in sooner.   She is with [facility]- PCP sees her every month.   No fevers, chills or recurrent infections. No night sweats.  No weight loss.  No CP/SOB   No lymphadenopathy.  No bleeding or bruising.  No depression at this time.   No pain  No urinary complaints today. was tested last week due to cognitive changes but negative.   No pain issues  Has not had breast exam this year          Review of Systems   Constitutional:         See above   All other systems reviewed and are negative.         Objective     Physical Exam  Vitals and nursing note reviewed.   Constitutional:       General: She is not in acute distress.     Appearance: Normal appearance. She is well-developed and normal weight. She is not ill-appearing.      Comments: Presents with her   ECOG=0  Very pleasant  Ambulates to exam table with ease.    HENT:      Head: Normocephalic and atraumatic.   Eyes:      General: No scleral icterus.     Extraocular Movements: Extraocular movements intact.      Conjunctiva/sclera: Conjunctivae normal.      Pupils: Pupils are equal, round, and reactive to light.   Neck:      Thyroid: No thyromegaly.   Cardiovascular:      Rate and Rhythm: Normal rate and regular rhythm.      Heart sounds: Normal heart sounds. No murmur heard.     No friction rub. No gallop.   Pulmonary:      Effort: Pulmonary effort is normal. No respiratory distress.      Breath sounds: Normal breath sounds. No wheezing, rhonchi or rales.     Virtual Regular Visit    Verification of patient location:    Patient is located in the following state in which I hold an active license [place]      Assessment/Plan:    Problem List Items Addressed This Visit        Other    Generalized anxiety disorder (Chronic)    Depression, major, recurrent, moderate (HCC) - Primary    Panic disorder without agoraphobia          Goals addressed in session: Goal 1 and Goal 2          Reason for visit is No chief complaint on file  Encounter provider Steve Zambrano    Provider located at [address] [place] [id number] [phone]      Recent Visits  Date Type Provider Dept   10/14/22 [address] [address] [facility] The[facility]ist Kamlesh   Francisco Colin is a 42 y o  male   Data: Carmelita Tavarez Comments: Breast- no masses or LAD  Abdominal:      General: Abdomen is flat. Bowel sounds are normal. There is no distension.      Palpations: Abdomen is soft. There is no mass.      Tenderness: There is no abdominal tenderness. There is no guarding or rebound.      Comments: No hepatosplenomegaly   Musculoskeletal:         General: No swelling, tenderness or deformity. Normal range of motion.      Cervical back: Normal range of motion and neck supple.      Right lower leg: No edema.      Left lower leg: No edema.      Comments: No spinal or paraspinal tenderness.    Lymphadenopathy:      Head:      Right side of head: No submandibular, preauricular, posterior auricular or occipital adenopathy.      Left side of head: No submandibular, preauricular, posterior auricular or occipital adenopathy.      Cervical: No cervical adenopathy.      Right cervical: No superficial, deep or posterior cervical adenopathy.     Left cervical: No superficial, deep or posterior cervical adenopathy.      Upper Body:      Right upper body: No supraclavicular adenopathy.      Left upper body: No supraclavicular adenopathy.      Lower Body: No right inguinal adenopathy. No left inguinal adenopathy.   Skin:     General: Skin is warm and dry.      Coloration: Skin is not jaundiced or pale.      Findings: No bruising, erythema, lesion or rash.   Neurological:      Mental Status: She is alert. Mental status is at baseline.      Coordination: Coordination normal.   Psychiatric:         Mood and Affect: Mood normal.         Behavior: Behavior normal.         Judgment: Judgment normal.      Comments: Has support with      Labs: reviewed.      Assessment and Plan     1. Chronic lymphocytic leukemia  -     CBC Oncology; Future  -     Comprehensive Metabolic Panel; Future  -     Lactate Dehydrogenase; Future; Expected date: 02/26/2024    2. Memory loss    3. Hyperglycemia    4. Anxiety about health        CLL  Clinically stable  Labs  reviewed.   She has historically not required treatment  Only needs treatment if wbc rapidly doubling, or significant anemia or thrombocytopenia noted, or recurrent infections or bulky lymphadenopathy    Memory loss- follows with neurology    Hyperglycemia-  Will discuss with PCP    Route Chart for Scheduling    Med Onc Chart Routing      Follow up with physician 4 months. cbc, cmp, ldh. see Dr. Merchant   Follow up with [facility]    Infusion scheduling note    Injection scheduling note    Labs    Imaging    Pharmacy appointment    Other referrals               Patient is in agreement with the proposed treatment plan. All questions were answered to the patient's satisfaction. Pt knows to call clinic for any new or worsening symptoms and if anything is needed before the next clinic visit.    Lino Felix, MSN, APRN, FNP-C  Nurse Practitioner to Dr. Rema Merchant  Lead [facility]  [facility]  Hematology & Medical Oncology  [address] [place] [id number]  ph. [phone] ext [phone]  Fax. [phone]              30 minutes of total time spent on the encounter, which includes face to face time and non-face to face time preparing to see the patient (eg, review of tests), Obtaining and/or reviewing separately obtained history, Documenting clinical information in the electronic or other health record, Independently interpreting results (not separately reported) and communicating results to the patient/family/caregiver, or Care coordination (not separately reported).

## 2024-02-27 LAB — PATH REV BLD -IMP: NORMAL

## 2024-06-06 ENCOUNTER — TELEPHONE (OUTPATIENT)
Dept: HEMATOLOGY/ONCOLOGY | Facility: CLINIC | Age: 84
End: 2024-06-06
Payer: MEDICARE

## 2024-06-06 NOTE — TELEPHONE ENCOUNTER
Reached out to JS 2 times today.   No answer left voicemail     Pt has appts already set up for follow up at the end of June. Will send out an appt letter.       ----- Message from Landy Abernathy RN sent at 6/5/2024  3:35 PM CDT -----  Regarding: FW: Scheduling Request  Contact: Yoli  I know she has nothing:) can you please call and help to schedule this patient?    Thank you,  Landy  ----- Message -----  From: Sabine Forbes  Sent: 6/5/2024  10:23 AM CDT  To: [facility]  Pool  Subject: Scheduling Request                                       Scheduling Request           Appt Type:  f/u     Date/Time Preference:     Treating Provider:Mayur     Caller Name:Yoli     Contact Preference:[phone]  ( JS)     Comments/notes:Pt JS is calling to schedule the pt F/u appt.  Requesting a call back.

## 2024-06-27 ENCOUNTER — TELEPHONE (OUTPATIENT)
Dept: HEMATOLOGY/ONCOLOGY | Facility: CLINIC | Age: 84
End: 2024-06-27
Payer: MEDICARE

## 2024-06-27 NOTE — TELEPHONE ENCOUNTER
----- Message from Milli Mendez sent at 6/27/2024  8:51 AM CDT -----  Regarding: Reschedule Existing Appointment  Contact: Yoli quintana  Reschedule Existing Appointment     Current appt date:06/27     Type of appt :labs, ep     Physician:Mayur     Reason for rescheduling Yoli is calling to reschedule patient due to her being sick. Requesting a call back     Caller:Yoli quintana     Contact Preference:[phone]

## 2024-08-29 ENCOUNTER — TELEPHONE (OUTPATIENT)
Dept: HEMATOLOGY/ONCOLOGY | Facility: CLINIC | Age: 84
End: 2024-08-29
Payer: MEDICARE

## 2024-08-29 NOTE — TELEPHONE ENCOUNTER
----- Message from Ismael Alas RN sent at 8/28/2024  4:22 PM CDT -----  Regarding: FW: Reschedule Existing Appointment  Contact: Yoli@ Cristino Bridges    ----- Message -----  From: Milli Mendez  Sent: 8/28/2024   1:06 PM CDT  To: [facility]  Pool  Subject: Reschedule Existing Appointment                  Reschedule Existing Appointment     Current appt date:09/05     Type of appt :Lab , EP     Physician:Mayur     Reason for rescheduling:Patients  is calling to reschedule due to patient moving. Requesting a call back     Caller:Yoli@ Cristino Fang      Contact Preference:[phone]

## 2024-10-18 ENCOUNTER — TELEPHONE (OUTPATIENT)
Dept: HEMATOLOGY/ONCOLOGY | Facility: CLINIC | Age: 84
End: 2024-10-18
Payer: MEDICARE

## 2024-10-18 NOTE — TELEPHONE ENCOUNTER
----- Message from GALLO Patel sent at 10/17/2024  4:29 PM CDT -----  Regarding: FW: Reschedule Existing Appointment  Contact: Yoli @ [phone]    ----- Message -----  From: Naomi Ibarra  Sent: 10/17/2024   3:00 PM CDT  To: Mayur ARTHUR Staff  Subject: Reschedule Existing Appointment                  Reschedule Existing Appointment     Current appt date:11/11     Type of appt :EP     Physician: Mayur     Reason for rescheduling: transportation     Caller:  (Yoli)     Contact Preference: [phone]

## 2024-12-18 ENCOUNTER — TELEPHONE (OUTPATIENT)
Dept: HEMATOLOGY/ONCOLOGY | Facility: CLINIC | Age: 84
End: 2024-12-18

## 2024-12-20 ENCOUNTER — TELEPHONE (OUTPATIENT)
Dept: HEMATOLOGY/ONCOLOGY | Facility: CLINIC | Age: 84
End: 2024-12-20
Payer: COMMERCIAL

## 2024-12-23 ENCOUNTER — OFFICE VISIT (OUTPATIENT)
Dept: HEMATOLOGY/ONCOLOGY | Facility: CLINIC | Age: 84
End: 2024-12-23

## 2024-12-23 ENCOUNTER — LAB VISIT (OUTPATIENT)
Dept: LAB | Facility: HOSPITAL | Age: 84
End: 2024-12-23

## 2024-12-23 VITALS
HEART RATE: 73 BPM | HEIGHT: 64 IN | DIASTOLIC BLOOD PRESSURE: 61 MMHG | OXYGEN SATURATION: 98 % | BODY MASS INDEX: 28.86 KG/M2 | TEMPERATURE: 97 F | WEIGHT: 169.06 LBS | SYSTOLIC BLOOD PRESSURE: 130 MMHG | RESPIRATION RATE: 17 BRPM

## 2024-12-23 DIAGNOSIS — C91.10 CHRONIC LYMPHOCYTIC LEUKEMIA: ICD-10-CM

## 2024-12-23 DIAGNOSIS — C91.10 CHRONIC LYMPHOCYTIC LEUKEMIA: Primary | ICD-10-CM

## 2024-12-23 LAB
ALBUMIN SERPL BCP-MCNC: 3.7 G/DL (ref 3.5–5.2)
ALP SERPL-CCNC: 70 U/L (ref 40–150)
ALT SERPL W/O P-5'-P-CCNC: 20 U/L (ref 10–44)
ANION GAP SERPL CALC-SCNC: 11 MMOL/L (ref 8–16)
AST SERPL-CCNC: 21 U/L (ref 10–40)
BILIRUB SERPL-MCNC: 0.4 MG/DL (ref 0.1–1)
BUN SERPL-MCNC: 14 MG/DL (ref 8–23)
CALCIUM SERPL-MCNC: 9.2 MG/DL (ref 8.7–10.5)
CHLORIDE SERPL-SCNC: 102 MMOL/L (ref 95–110)
CO2 SERPL-SCNC: 27 MMOL/L (ref 23–29)
CREAT SERPL-MCNC: 0.9 MG/DL (ref 0.5–1.4)
ERYTHROCYTE [DISTWIDTH] IN BLOOD BY AUTOMATED COUNT: 13.9 % (ref 11.5–14.5)
EST. GFR  (NO RACE VARIABLE): >60 ML/MIN/1.73 M^2
GLUCOSE SERPL-MCNC: 219 MG/DL (ref 70–110)
HCT VFR BLD AUTO: 42.7 % (ref 37–48.5)
HGB BLD-MCNC: 13.2 G/DL (ref 12–16)
IMM GRANULOCYTES # BLD AUTO: 0.05 K/UL (ref 0–0.04)
LDH SERPL L TO P-CCNC: 198 U/L (ref 110–260)
MCH RBC QN AUTO: 28.6 PG (ref 27–31)
MCHC RBC AUTO-ENTMCNC: 30.9 G/DL (ref 32–36)
MCV RBC AUTO: 93 FL (ref 82–98)
NEUTROPHILS # BLD AUTO: 5.6 K/UL (ref 1.8–7.7)
PLATELET # BLD AUTO: 229 K/UL (ref 150–450)
PMV BLD AUTO: 10.6 FL (ref 9.2–12.9)
POTASSIUM SERPL-SCNC: 3.8 MMOL/L (ref 3.5–5.1)
PROT SERPL-MCNC: 6.5 G/DL (ref 6–8.4)
RBC # BLD AUTO: 4.61 M/UL (ref 4–5.4)
SODIUM SERPL-SCNC: 140 MMOL/L (ref 136–145)
WBC # BLD AUTO: 24.74 K/UL (ref 3.9–12.7)

## 2024-12-23 PROCEDURE — 36415 COLL VENOUS BLD VENIPUNCTURE: CPT | Performed by: NURSE PRACTITIONER

## 2024-12-23 PROCEDURE — 80053 COMPREHEN METABOLIC PANEL: CPT | Performed by: NURSE PRACTITIONER

## 2024-12-23 PROCEDURE — 99999 PR PBB SHADOW E&M-EST. PATIENT-LVL IV: CPT | Mod: PBBFAC,,, | Performed by: INTERNAL MEDICINE

## 2024-12-23 PROCEDURE — 83615 LACTATE (LD) (LDH) ENZYME: CPT | Performed by: NURSE PRACTITIONER

## 2024-12-23 PROCEDURE — 99214 OFFICE O/P EST MOD 30 MIN: CPT | Mod: PBBFAC | Performed by: INTERNAL MEDICINE

## 2024-12-23 PROCEDURE — 85027 COMPLETE CBC AUTOMATED: CPT | Performed by: NURSE PRACTITIONER

## 2024-12-23 PROCEDURE — 99214 OFFICE O/P EST MOD 30 MIN: CPT | Mod: S$PBB,,, | Performed by: INTERNAL MEDICINE

## 2024-12-23 NOTE — PROGRESS NOTES
Subjective     Patient ID: Jorge Montgomery is a 84 y.o. female.    Chief Complaint: Chronic lymphocytic leukemia    HPI    Returns for follow up of CLL    Presents today with Funmilayo, a   She now lives in an assisted living memory center     In the [facility] states:she has been worried about her CLL  Weight stable  Denies pain  No recurrent infections  She has good recollection for why she visits our clinic- struggles in other areas and noted   Good energy level  No fevers, chills or infectious complaints  No night sweats  No bleeding or bruising    Review of Systems   Constitutional:  Negative for activity change, appetite change, chills, fatigue, fever and unexpected weight change.   HENT:  Negative for nasal congestion, postnasal drip, sinus pressure/congestion, sore throat and trouble swallowing.    Eyes:  Negative for visual disturbance.   Respiratory:  Negative for cough and shortness of breath.    Cardiovascular:  Negative for chest pain, palpitations and leg swelling.   Gastrointestinal:  Negative for abdominal distention, abdominal pain, blood in stool, change in bowel habit, constipation, diarrhea (rare with diet), nausea, vomiting and reflux.   Genitourinary:  Negative for decreased urine volume, difficulty urinating, dysuria, frequency and urgency.   Musculoskeletal:  Negative for arthralgias, back pain, joint swelling, myalgias and joint deformity.   Integumentary:  Negative for rash.   Neurological:  Negative for dizziness, weakness, numbness and headaches.   Hematological:  Negative for adenopathy. Does not bruise/bleed easily.   Psychiatric/Behavioral:  Positive for confusion. Negative for decreased concentration, dysphoric mood and sleep disturbance. The patient is not nervous/anxious.           Objective     Physical Exam  Vitals and nursing note reviewed.   Constitutional:       General: She is not in acute distress.     Appearance: Normal appearance. She is well-developed and  normal weight. She is not ill-appearing.      Comments: Presents with her   ECOG=0  Very pleasant   HENT:      Head: Normocephalic and atraumatic.   Eyes:      General: No scleral icterus.     Extraocular Movements: Extraocular movements intact.      Conjunctiva/sclera: Conjunctivae normal.      Pupils: Pupils are equal, round, and reactive to light.   Neck:      Thyroid: No thyromegaly.   Cardiovascular:      Rate and Rhythm: Normal rate and regular rhythm.      Heart sounds: Normal heart sounds. No murmur heard.     No friction rub. No gallop.   Pulmonary:      Effort: Pulmonary effort is normal. No respiratory distress.      Breath sounds: Normal breath sounds. No wheezing, rhonchi or rales.   Abdominal:      General: Abdomen is flat. Bowel sounds are normal. There is no distension.      Palpations: Abdomen is soft. There is no mass.      Tenderness: There is no abdominal tenderness. There is no guarding or rebound.      Comments: No hepatosplenomegaly   Musculoskeletal:         General: No swelling, tenderness or deformity. Normal range of motion.      Cervical back: Normal range of motion and neck supple.      Right lower leg: No edema.      Left lower leg: No edema.   Lymphadenopathy:      Head:      Right side of head: No submandibular, preauricular, posterior auricular or occipital adenopathy.      Left side of head: No submandibular, preauricular, posterior auricular or occipital adenopathy.      Cervical: No cervical adenopathy.      Right cervical: No superficial, deep or posterior cervical adenopathy.     Left cervical: No superficial, deep or posterior cervical adenopathy.      Upper Body:      Right upper body: No supraclavicular adenopathy.      Left upper body: No supraclavicular adenopathy.      Lower Body: No right inguinal adenopathy. No left inguinal adenopathy.   Skin:     General: Skin is warm and dry.      Coloration: Skin is not jaundiced or pale.      Findings: No bruising,  erythema, lesion or rash.   Neurological:      General: No focal deficit present.      Mental Status: She is alert.      Sensory: No sensory deficit.      Motor: No weakness.      Coordination: Coordination normal.      Gait: Gait normal.      Comments: Notable memory loss   Psychiatric:         Mood and Affect: Mood normal.         Behavior: Behavior normal.         Judgment: Judgment normal.      Comments: Has support with           Assessment and Plan     1. Chronic lymphocytic leukemia    Counts appropriate  CLL stable parameters and no clinical symptoms  RTC 6 months  Knows to call for any issues    Route Chart for Scheduling    Med Onc Chart Routing      Follow up with physician 6 months. labs prior   Follow up with [facility]    Infusion scheduling note    Injection scheduling note    Labs    Imaging    Pharmacy appointment    Other referrals                      No follow-ups on file.     no

## 2025-03-08 ENCOUNTER — OFFICE VISIT (OUTPATIENT)
Dept: URGENT CARE | Facility: CLINIC | Age: 85
End: 2025-03-08
Payer: MEDICARE

## 2025-03-08 VITALS
HEART RATE: 70 BPM | SYSTOLIC BLOOD PRESSURE: 126 MMHG | BODY MASS INDEX: 28.85 KG/M2 | DIASTOLIC BLOOD PRESSURE: 72 MMHG | HEIGHT: 64 IN | OXYGEN SATURATION: 95 % | RESPIRATION RATE: 16 BRPM | TEMPERATURE: 99 F | WEIGHT: 169 LBS

## 2025-03-08 DIAGNOSIS — R05.1 ACUTE COUGH: ICD-10-CM

## 2025-03-08 DIAGNOSIS — J98.8 VIRAL RESPIRATORY INFECTION: Primary | ICD-10-CM

## 2025-03-08 DIAGNOSIS — B97.89 VIRAL RESPIRATORY INFECTION: Primary | ICD-10-CM

## 2025-03-08 LAB
CTP QC/QA: YES
CTP QC/QA: YES
POC MOLECULAR INFLUENZA A AGN: NEGATIVE
POC MOLECULAR INFLUENZA B AGN: NEGATIVE
SARS CORONAVIRUS 2 ANTIGEN: NEGATIVE

## 2025-03-08 PROCEDURE — 99203 OFFICE O/P NEW LOW 30 MIN: CPT | Mod: S$GLB,,, | Performed by: NURSE PRACTITIONER

## 2025-03-08 PROCEDURE — 87811 SARS-COV-2 COVID19 W/OPTIC: CPT | Mod: QW,S$GLB,, | Performed by: NURSE PRACTITIONER

## 2025-03-08 PROCEDURE — 87502 INFLUENZA DNA AMP PROBE: CPT | Mod: QW,S$GLB,, | Performed by: NURSE PRACTITIONER

## 2025-03-08 NOTE — LETTER
March 8, 2025      [facility]  [address]  [facility] [place] [address]  Phone: [phone]  Fax: [phone]       Patient: Jorge Montgomery   YOB: 1940  Date of Visit: 03/08/2025    To Whom It May Concern:    Mounika Montgomery  was at [facility] on 03/08/2025.   She will take an over the counter cough medication for her cough, with instructions as follows:     Robitussin Cough and Chest Congestion DM  Take 20 mL by mouth every 8 hours as needed for cough and congestion    Please contact me with any questions or concerns,     Sincerely,    Jade Alonzo, JOSE-BC

## 2025-03-08 NOTE — PROGRESS NOTES
"Subjective:      Patient ID: Jorge Montgomery is a 84 y.o. female.    Vitals:  height is 5' 4" (1.626 m) and weight is 76.7 kg (169 lb). Her oral temperature is 99.1 °F (37.3 °C). Her blood pressure is 126/72 and her pulse is 70. Her respiration is 16 and oxygen saturation is 95%.     Chief Complaint: Cough    This is a 84 y.o. female who presents today with a chief complaint of cough, runny nose, congestion, and difficulty spitting up mucous.   Symptoms began 3 days ago.   Denies fever. Not yet taking any meds. She lives at [facility] and was exposed to neighbor with covid.   She has been tested twice for covid, and so far both tests were negative.    Not drinking well (usually doesn't)  Had dementia    Cough  This is a new problem. The current episode started in the past 7 days. The problem has been gradually worsening. The problem occurs constantly. The cough is Productive of sputum. Associated symptoms include chills, headaches, nasal congestion, postnasal drip, rhinorrhea and wheezing. Pertinent negatives include no chest pain, ear congestion, ear pain, fever, heartburn, hemoptysis, myalgias, rash, sore throat, shortness of breath, sweats or weight loss. The symptoms are aggravated by lying down. She has tried nothing for the symptoms. There is no history of asthma, bronchiectasis, bronchitis, COPD, emphysema, environmental allergies or pneumonia. dementia       Constitution: Positive for chills. Negative for fever.   HENT:  Positive for congestion and postnasal drip. Negative for ear pain and sore throat.    Cardiovascular:  Negative for chest pain.   Respiratory:  Positive for cough and wheezing. Negative for bloody sputum and shortness of breath.    Gastrointestinal:  Negative for vomiting, diarrhea and heartburn.   Musculoskeletal:  Negative for muscle ache.   Skin:  Negative for rash.   Allergic/Immunologic: Negative for environmental allergies.   Neurological:  Positive for headaches.      Objective: "     Physical Exam   Constitutional: She is oriented to person, place, and time. She appears well-developed. She is cooperative.  Non-toxic appearance. She does not appear ill. No distress.   HENT:   Head: Normocephalic and atraumatic.   Ears:   Right Ear: Hearing, tympanic membrane, external ear and ear canal normal.   Left Ear: Hearing, tympanic membrane, external ear and ear canal normal.   Nose: Congestion present. No mucosal edema, rhinorrhea or nasal deformity. No epistaxis. Right sinus exhibits no maxillary sinus tenderness and no frontal sinus tenderness. Left sinus exhibits no maxillary sinus tenderness and no frontal sinus tenderness.   Mouth/Throat: Uvula is midline and mucous membranes are normal. Mucous membranes are moist. No trismus in the jaw. Normal dentition. No uvula swelling. Posterior oropharyngeal erythema present. No oropharyngeal exudate or posterior oropharyngeal edema. Oropharynx is clear.   Eyes: Conjunctivae and lids are normal. No scleral icterus.   Neck: Trachea normal and phonation normal. Neck supple. No edema present. No erythema present. No neck rigidity present.   Cardiovascular: Normal rate and regular rhythm.   Pulmonary/Chest: Effort normal and breath sounds normal. No respiratory distress. She has no decreased breath sounds. She has no wheezes. She has no rhonchi.         Comments: Harsh, wet cough noted    Abdominal: Normal appearance.   Musculoskeletal: Normal range of motion.         General: No deformity. Normal range of motion.   Neurological: She is alert and oriented to person, place, and time. She exhibits normal muscle tone. Coordination normal.   Skin: Skin is warm, dry, intact, not diaphoretic and not pale.   Psychiatric: Her speech is normal and behavior is normal. Judgment and thought content normal.   Nursing note and vitals reviewed.    Results for orders placed or performed in visit on 03/08/25   SARS Coronavirus 2 Antigen, POCT Manual Read    Collection Time:  03/08/25 12:53 PM   Result Value Ref Range    SARS Coronavirus 2 Antigen Negative Negative, Presumptive Negative     Acceptable Yes    POCT Influenza A/B MOLECULAR    Collection Time: 03/08/25 12:53 PM   Result Value Ref Range    POC Molecular Influenza A Ag Negative Negative    POC Molecular Influenza B Ag Negative Negative     Acceptable Yes       Assessment:     1. Viral respiratory infection    2. Acute cough        Plan:       Viral respiratory infection  -     SARS Coronavirus 2 Antigen, POCT Manual Read  -     POCT Influenza A/B MOLECULAR    Acute cough  -     SARS Coronavirus 2 Antigen, POCT Manual Read  -     POCT Influenza A/B MOLECULAR      Patient Instructions   Oral fluids  Hot tea with honey   Throat or cough lozenges  Ibuprofen or tylenol for any aches or fever   Rest  Steam from hot showers to help open upper airway  Blow nose often  Avoid circulating air (such as ceiling fans) dries your airway  Avoid drinking cold drinks (worsens cough)  Avoid strong smells which could worsen cough (perfume, lotions, smoke...)  You can also try a humidifier  Therapeutic coughing to expel mucous  Sit in upright position often  Follow up with any worsening symptoms; and seek ER care with any wheezing, retractions, or respiratory distress; lethargy; dehydration...

## 2025-03-26 ENCOUNTER — TELEPHONE (OUTPATIENT)
Dept: HEMATOLOGY/ONCOLOGY | Facility: CLINIC | Age: 85
End: 2025-03-26

## 2025-05-19 ENCOUNTER — TELEPHONE (OUTPATIENT)
Dept: HEMATOLOGY/ONCOLOGY | Facility: CLINIC | Age: 85
End: 2025-05-19

## 2025-05-19 NOTE — TELEPHONE ENCOUNTER
----- Message from Sabine sent at 5/19/2025  9:04 AM CDT -----  Regarding: later appt time  Contact: Yoli  Reschedule Existing Appointment Current appt date:05/26/2025 Type of appt :Labs/ F/U Physician:Mayur Reason for rescheduling: Pt SW is calling to see if pt can get a late time for her appt, or any day between 11:00 am and  3:00 pm Caller: Yoli Contact Preference:[phone], requesting a call back.

## 2025-05-19 NOTE — TELEPHONE ENCOUNTER
----- Message from Yolanda sent at 5/19/2025 12:19 PM CDT -----  Regarding: Appt  Contact: [phone]  Current Appt date:  05/26/25 Type of Appt: Lab and est  Pt  Physician: Rema Merchant MD   Caller: Care Manager   Contact Preference: [phone]

## 2025-05-26 ENCOUNTER — LAB VISIT (OUTPATIENT)
Dept: LAB | Facility: HOSPITAL | Age: 85
End: 2025-05-26
Attending: INTERNAL MEDICINE

## 2025-05-26 ENCOUNTER — OFFICE VISIT (OUTPATIENT)
Dept: HEMATOLOGY/ONCOLOGY | Facility: CLINIC | Age: 85
End: 2025-05-26

## 2025-05-26 VITALS
OXYGEN SATURATION: 98 % | WEIGHT: 169.06 LBS | DIASTOLIC BLOOD PRESSURE: 60 MMHG | SYSTOLIC BLOOD PRESSURE: 122 MMHG | BODY MASS INDEX: 28.86 KG/M2 | TEMPERATURE: 97 F | HEIGHT: 64 IN | RESPIRATION RATE: 17 BRPM | HEART RATE: 72 BPM

## 2025-05-26 DIAGNOSIS — C91.10 CHRONIC LYMPHOCYTIC LEUKEMIA: Primary | ICD-10-CM

## 2025-05-26 DIAGNOSIS — C91.10 CHRONIC LYMPHOCYTIC LEUKEMIA: ICD-10-CM

## 2025-05-26 LAB
ABSOLUTE NEUTROPHIL MANUAL (OHS): 10.5 K/UL
ALBUMIN SERPL BCP-MCNC: 3.6 G/DL (ref 3.5–5.2)
ALP SERPL-CCNC: 79 UNIT/L (ref 40–150)
ALT SERPL W/O P-5'-P-CCNC: 23 UNIT/L (ref 10–44)
ANION GAP (OHS): 11 MMOL/L (ref 8–16)
AST SERPL-CCNC: 21 UNIT/L (ref 11–45)
BILIRUB SERPL-MCNC: 0.5 MG/DL (ref 0.1–1)
BUN SERPL-MCNC: 17 MG/DL (ref 8–23)
CALCIUM SERPL-MCNC: 9.5 MG/DL (ref 8.7–10.5)
CHLORIDE SERPL-SCNC: 102 MMOL/L (ref 95–110)
CO2 SERPL-SCNC: 29 MMOL/L (ref 23–29)
CREAT SERPL-MCNC: 0.8 MG/DL (ref 0.5–1.4)
EOSINOPHIL NFR BLD MANUAL: 2 % (ref 0–8)
ERYTHROCYTE [DISTWIDTH] IN BLOOD BY AUTOMATED COUNT: 14.2 % (ref 11.5–14.5)
GFR SERPLBLD CREATININE-BSD FMLA CKD-EPI: >60 ML/MIN/1.73/M2
GLUCOSE SERPL-MCNC: 178 MG/DL (ref 70–110)
HCT VFR BLD AUTO: 45.7 % (ref 37–48.5)
HGB BLD-MCNC: 14.1 GM/DL (ref 12–16)
LDH SERPL-CCNC: 192 U/L (ref 110–260)
LYMPHOCYTES NFR BLD MANUAL: 57 % (ref 18–48)
MCH RBC QN AUTO: 28.4 PG (ref 27–31)
MCHC RBC AUTO-ENTMCNC: 30.9 G/DL (ref 32–36)
MCV RBC AUTO: 92 FL (ref 82–98)
MONOCYTES NFR BLD MANUAL: 1 % (ref 4–15)
NEUTROPHILS NFR BLD MANUAL: 40 % (ref 38–73)
NUCLEATED RBC (/100WBC) (OHS): 0 /100 WBC
PLATELET # BLD AUTO: 268 K/UL (ref 150–450)
PLATELET BLD QL SMEAR: ABNORMAL
PMV BLD AUTO: 10.3 FL (ref 9.2–12.9)
POTASSIUM SERPL-SCNC: 4.3 MMOL/L (ref 3.5–5.1)
PROT SERPL-MCNC: 6.5 GM/DL (ref 6–8.4)
RBC # BLD AUTO: 4.96 M/UL (ref 4–5.4)
SODIUM SERPL-SCNC: 142 MMOL/L (ref 136–145)
WBC # BLD AUTO: 26.32 K/UL (ref 3.9–12.7)

## 2025-05-26 PROCEDURE — 99214 OFFICE O/P EST MOD 30 MIN: CPT | Mod: S$PBB,,, | Performed by: INTERNAL MEDICINE

## 2025-05-26 PROCEDURE — G2211 COMPLEX E/M VISIT ADD ON: HCPCS | Mod: S$PBB,,, | Performed by: INTERNAL MEDICINE

## 2025-05-26 PROCEDURE — 99999 PR PBB SHADOW E&M-EST. PATIENT-LVL IV: CPT | Mod: PBBFAC,,, | Performed by: INTERNAL MEDICINE

## 2025-05-26 PROCEDURE — 80053 COMPREHEN METABOLIC PANEL: CPT

## 2025-05-26 PROCEDURE — 99214 OFFICE O/P EST MOD 30 MIN: CPT | Mod: PBBFAC | Performed by: INTERNAL MEDICINE

## 2025-05-26 PROCEDURE — 85025 COMPLETE CBC W/AUTO DIFF WBC: CPT

## 2025-05-26 PROCEDURE — 83615 LACTATE (LD) (LDH) ENZYME: CPT

## 2025-05-26 PROCEDURE — 36415 COLL VENOUS BLD VENIPUNCTURE: CPT

## 2025-05-26 NOTE — PROGRESS NOTES
Subjective     Patient ID: Jorge Montgomery is a 84 y.o. female.    Chief Complaint: Chronic lymphocytic leukemia    HPI    Increased antidepressant in the interval-- for more sadness  Memory unchanged  Eating well and drinking well    Weight up   No fevers, chills or infections  No night sweats  No itching- chronic dry skin  No LAD    Multiple activities at the Cr  Would not mind skipping  Granddaughter and great granddaughter visit 2 x per week    Review of Systems   Constitutional:  Negative for chills, fatigue, fever, night sweats and unexpected weight change.   Eyes:  Negative for visual disturbance.   Respiratory:  Negative for cough, shortness of breath and wheezing.    Cardiovascular:  Negative for chest pain, palpitations and leg swelling.   Gastrointestinal:  Negative for abdominal distention, abdominal pain, blood in stool, change in bowel habit, constipation, diarrhea, nausea, vomiting and reflux.   Genitourinary:  Positive for bladder incontinence. Negative for decreased urine volume, difficulty urinating, dysuria and frequency.   Musculoskeletal:  Negative for arthralgias, back pain, gait problem, joint swelling and joint deformity.   Neurological:  Negative for dizziness, light-headedness and headaches.   Psychiatric/Behavioral:  Positive for confusion (off and on), decreased concentration (stable) and dysphoric mood. Negative for sleep disturbance (sometimes noted). The patient is not nervous/anxious.           Objective     Physical Exam  Vitals and nursing note reviewed.   Constitutional:       General: She is not in acute distress.     Appearance: Normal appearance. She is well-developed and normal weight. She is not ill-appearing.      Comments: Presents with her   ECOG=0  Very pleasant   HENT:      Head: Normocephalic and atraumatic.   Eyes:      General: No scleral icterus.     Extraocular Movements: Extraocular movements intact.      Conjunctiva/sclera: Conjunctivae normal.       Pupils: Pupils are equal, round, and reactive to light.   Neck:      Thyroid: No thyromegaly.   Cardiovascular:      Rate and Rhythm: Normal rate and regular rhythm.      Heart sounds: Normal heart sounds. No murmur heard.     No friction rub. No gallop.   Pulmonary:      Effort: Pulmonary effort is normal. No respiratory distress.      Breath sounds: Normal breath sounds. No wheezing, rhonchi or rales.   Abdominal:      General: Abdomen is flat. Bowel sounds are normal. There is no distension.      Palpations: Abdomen is soft. There is no mass.      Tenderness: There is no abdominal tenderness. There is no guarding or rebound.      Comments: No hepatosplenomegaly   Musculoskeletal:         General: No swelling, tenderness or deformity. Normal range of motion.      Cervical back: Normal range of motion and neck supple.      Right lower leg: No edema.      Left lower leg: No edema.   Lymphadenopathy:      Head:      Right side of head: No submandibular, preauricular, posterior auricular or occipital adenopathy.      Left side of head: No submandibular, preauricular, posterior auricular or occipital adenopathy.      Cervical: No cervical adenopathy.      Right cervical: No superficial, deep or posterior cervical adenopathy.     Left cervical: No superficial, deep or posterior cervical adenopathy.      Upper Body:      Right upper body: No supraclavicular adenopathy.      Left upper body: No supraclavicular adenopathy.      Lower Body: No right inguinal adenopathy. No left inguinal adenopathy.   Skin:     General: Skin is warm and dry.      Coloration: Skin is not jaundiced or pale.      Findings: No bruising, erythema, lesion or rash.   Neurological:      General: No focal deficit present.      Mental Status: She is alert.      Sensory: No sensory deficit.      Motor: No weakness.      Coordination: Coordination normal.      Gait: Gait normal.      Comments: Notable memory loss   Psychiatric:         Mood and  Affect: Mood normal.         Behavior: Behavior normal.         Judgment: Judgment normal.      Comments: Has support with   + memory issues   Labs- reviewed       Assessment and Plan     1. Chronic lymphocytic leukemia    Parameters stable  RTC 1 year  Caregiver knows to call for any issues    Route Chart for Scheduling    Med Onc Chart Routing      Follow up with physician 1 year. cbc, cmp, lsh prior   Follow up with [facility]    Infusion scheduling note    Injection scheduling note    Labs    Imaging    Pharmacy appointment    Other referrals

## 2025-07-03 ENCOUNTER — HOSPITAL ENCOUNTER (EMERGENCY)
Facility: HOSPITAL | Age: 85
Discharge: HOME | End: 2025-07-03
Attending: EMERGENCY MEDICINE
Payer: MEDICARE

## 2025-07-03 VITALS
DIASTOLIC BLOOD PRESSURE: 54 MMHG | HEART RATE: 72 BPM | TEMPERATURE: 97 F | RESPIRATION RATE: 20 BRPM | OXYGEN SATURATION: 96 % | WEIGHT: 169.31 LBS | SYSTOLIC BLOOD PRESSURE: 125 MMHG | HEIGHT: 64 IN | BODY MASS INDEX: 28.91 KG/M2
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
## 2025-07-03 DIAGNOSIS — S42.291A CLOSED FRACTURE OF HEAD OF RIGHT HUMERUS, INITIAL ENCOUNTER: Primary | ICD-10-CM

## 2025-07-03 LAB
BUN SERPL-MCNC: 14 MG/DL (ref 6–30)
CHLORIDE SERPL-SCNC: 100 MMOL/L (ref 95–110)
CREAT SERPL-MCNC: 0.9 MG/DL (ref 0.5–1.4)
GLUCOSE SERPL-MCNC: 167 MG/DL (ref 70–110)
HCT VFR BLD CALC: 41 %PCV (ref 36–54)
HCV AB SERPL QL IA: NORMAL
HIV 1+2 AB+HIV1 P24 AG SERPL QL IA: NORMAL
OHS QRS DURATION: 66 MS
OHS QTC CALCULATION: 427 MS
POC IONIZED CALCIUM: 1.14 MMOL/L (ref 1.06–1.42)
POC TCO2 (MEASURED): 27 MMOL/L (ref 23–29)
POTASSIUM BLD-SCNC: 4.2 MMOL/L (ref 3.5–5.1)
SAMPLE: ABNORMAL
SODIUM BLD-SCNC: 140 MMOL/L (ref 136–145)

## 2025-07-03 PROCEDURE — 25000003 PHARM REV CODE 250

## 2025-07-03 PROCEDURE — 86803 HEPATITIS C AB TEST: CPT | Performed by: PHYSICIAN ASSISTANT

## 2025-07-03 PROCEDURE — 93010 ELECTROCARDIOGRAM REPORT: CPT | Mod: ,,, | Performed by: INTERNAL MEDICINE

## 2025-07-03 PROCEDURE — 99285 EMERGENCY DEPT VISIT HI MDM: CPT | Mod: 25

## 2025-07-03 PROCEDURE — 93005 ELECTROCARDIOGRAM TRACING: CPT

## 2025-07-03 PROCEDURE — 87389 HIV-1 AG W/HIV-1&-2 AB AG IA: CPT | Performed by: PHYSICIAN ASSISTANT

## 2025-07-03 RX ORDER — OXYCODONE HYDROCHLORIDE 5 MG/1
5 TABLET ORAL
Refills: 0 | Status: COMPLETED | OUTPATIENT
Start: 2025-07-03 | End: 2025-07-03

## 2025-07-03 RX ORDER — ACETAMINOPHEN 325 MG/1
650 TABLET ORAL
Status: COMPLETED | OUTPATIENT
Start: 2025-07-03 | End: 2025-07-03

## 2025-07-03 RX ADMIN — OXYCODONE HYDROCHLORIDE 5 MG: 5 TABLET ORAL at 11:07

## 2025-07-03 RX ADMIN — ACETAMINOPHEN 650 MG: 325 TABLET ORAL at 12:07

## 2025-07-03 NOTE — DISCHARGE INSTRUCTIONS
If needed please follow up with Orthopedic surgery about your fracture.    Please wear the sling at all times if able.  It is okay to remove for bathing.  Please exercise caution when moving right upper extremity as her shoulder is broken.   [address] [facility] that referral was received, they will follow pt at d/c, and they will call pts daughter/Theresa to establish contact. No further PCSW needs at this time. PCSW left vm for Unit RANJAN/Shabana advising of above.

## 2025-07-03 NOTE — ED NOTES
I-STAT Chem-8+ Results:   Value Reference Range   Sodium 140 136-145 mmol/L   Potassium  4.2 3.5-5.1 mmol/L   Chloride 100  mmol/L   Ionized Calcium 1.14 1.06-1.42 mmol/L   CO2 (measured) 27 23-29 mmol/L   Glucose 167  mg/dL   BUN 14 6-30 mg/dL   Creatinine 0.9 0.5-1.4 mg/dL   Hematocrit 41 36-54%

## 2025-07-03 NOTE — ED TRIAGE NOTES
Pt came to ED with an unwitnessed fall. Ambulation at baseline. Complaint of right sided arm pain that limits arm movement.

## 2025-07-03 NOTE — ED PROVIDER NOTES
Encounter Date: 7/3/2025       History     Chief Complaint   Patient presents with    Fall     From the [facility]. Unwitnessed fall, hx. Of dementia. Right sided shoulder + arm pain.      Patient is a 84-year-old female with past medical history of dementia, and CLL that presents to the emergency department after fall that was unwitnessed.  Patient lives at [facility] brought in by EMS found her on the ground and she was holding her right shoulder.  Patient is seems to be at her mental baseline, but is unable to provide any history stating she does not remember what had happen.         Review of patient's allergies indicates:   Allergen Reactions    Compazine [prochlorperazine edisylate]     Sulfa (sulfonamide antibiotics)      Past Medical History:   Diagnosis Date    Adjustment disorder 2012    Depression    ALLERGIC RHINITIS 2012    Allergy     AR (allergic rhinitis) 2012    Arthritis     Cataract     Chronic lymphocytic leukemia     CLL (chronic lymphocytic leukemia)     CMC arthritis, thumb, degenerative 1/15/2015    Colon polyp     Depression     Diverticulosis     Dry eye syndrome     Esophageal reflux     Hearing loss, sensorineural 2015    Hyperlipidemia 2012    Interstitial cystitis 2012    Keloid cicatrix     Major depressive disorder, single episode, mild 10/20/2015    PVD (posterior vitreous detachment)     both eyes    Tinnitus, subjective 2015    Vertigo 10/25/2013     Past Surgical History:   Procedure Laterality Date     SECTION, CLASSIC      X1    CHOLECYSTECTOMY      COLONOSCOPY N/A 10/3/2017    Procedure: COLONOSCOPY;  Surgeon: Kush Ramesh MD;  Location: [address];  Service: Endoscopy;  Laterality: N/A;  Patient requested this day specifically, Dr. Pineda unavailable and pt aware    ganglion cyst removed left foot      HYSTERECTOMY      (AUB), ovaries remain    LIVER BIOPSY      Hemangioma    TONSILLECTOMY       Family History   Problem  Relation Name Age of Onset    Heart disease Mother      Hypertension Mother      Macular degeneration Mother      Cancer Mother          CLL    Dementia Mother      Heart disease Father      Colon cancer Brother Lencho     Diabetes Brother Antolin     No Known Problems Daughter Alexa     COPD Son Cy     No Known Problems Brother Fred     Melanoma Neg Hx      Psoriasis Neg Hx      Lupus Neg Hx      Eczema Neg Hx      Acne Neg Hx      Breast cancer Neg Hx      Ovarian cancer Neg Hx      Blindness Neg Hx      Amblyopia Neg Hx      Cataracts Neg Hx      Glaucoma Neg Hx      Retinal detachment Neg Hx      Strabismus Neg Hx      Stroke Neg Hx      Thyroid disease Neg Hx       Social History[1]  Review of Systems  ROS negative except as noted in HPI     Physical Exam     Initial Vitals [07/03/25 0709]   BP Pulse Resp Temp SpO2   127/71 70 16 97.7 °F (36.5 °C) 96 %      MAP       --         Physical Exam    Constitutional: She appears well-developed and well-nourished. She is not diaphoretic. No distress.   HENT:   Head: Normocephalic and atraumatic. Mouth/Throat: Oropharynx is clear and moist.   Eyes: Conjunctivae are normal.   Cardiovascular:  Normal rate and normal heart sounds.           Palpable dp and pt pulses bilaterally   Pulmonary/Chest: Breath sounds normal. No respiratory distress. She has no rales.   Abdominal: Abdomen is soft. She exhibits no distension. There is no abdominal tenderness.   Musculoskeletal:         General: No edema.      Comments: Tenderness over R shoulder/proximal humerus. Compartments soft. No tenderness or pain with ROM in b/l lower extremities or LUE. No tenderness in RUE distal to mid humerus     Neurological: She is alert. She has normal strength.   Alert to name and place    Normal , thumb extension, pincer, finger abduction and adduction, wrist flexion and extension in R hand.   Intact median, ulnar, radial, and axillary nerve sensation in RUE   Skin: Skin is warm. Capillary  refill takes less than 2 seconds.           ED Course   Procedures  Labs Reviewed   ISTAT PROCEDURE - Abnormal       Result Value    POC Glucose 167 (*)     POC BUN 14      POC Creatinine 0.9      POC Sodium 140      POC Potassium 4.2      POC Chloride 100      POC TCO2 (MEASURED) 27      POC Ionized Calcium 1.14      POC Hematocrit 41      Sample SUNSHINE     HEPATITIS C ANTIBODY   HEP C VIRUS HOLD SPECIMEN   HIV 1 / 2 ANTIBODY   ISTAT CHEM8        ECG Results              EKG 12-lead (Final result)        Collection Time Result Time QRS Duration OHS QTC Calculation    07/03/25 08:48:53 07/03/25 09:36:21 66 427                     Final result by Interface, Lab In [facility] (07/03/25 09:36:30)                   Narrative:    Test Reason : W19.XXXA,    Vent. Rate :  70 BPM     Atrial Rate :  70 BPM     P-R Int : 122 ms          QRS Dur :  66 ms      QT Int : 396 ms       P-R-T Axes :  47  38 -14 degrees    QTcB Int : 427 ms    Sinus rhythm with Premature atrial complexes in a pattern of bigeminy  Otherwise normal ECG  When compared with ECG of 20-Apr-2022 19:55,  Premature atrial complexes are now Present  Confirmed by Jesse Kohler ([id number]) on 7/3/2025 9:36:18 AM    Referred By: AAAREFERRAL SELF           Confirmed By: Jesse Kohler                                  Imaging Results              X-Ray Humerus 2 View Right (Final result)  Result time 07/03/25 09:16:26      Final result by Erick Doan MD (07/03/25 09:16:26)                   Impression:      See above      Electronically signed by: Erick Doan MD  Date:    07/03/2025  Time:    09:16               Narrative:    EXAMINATION:  XR HUMERUS 2 VIEW RIGHT    CLINICAL HISTORY:  Unspecified fall, initial encounter    TECHNIQUE:  Right humerus two views    COMPARISON:  None    FINDINGS:  Fracture involving the right humeral head and neck.  No fracture or dislocation involving the distal humerus.                                       X-Ray Shoulder Trauma Right (Final  result)  Result time 07/03/25 09:16:49      Final result by Lance Colon MD (07/03/25 09:16:49)                   Impression:      Comminuted and mildly displaced fracture of the right proximal humerus.      Electronically signed by: Lance Colon MD  Date:    07/03/2025  Time:    09:16               Narrative:    EXAMINATION:  XR SHOULDER TRAUMA 3 VIEW RIGHT    CLINICAL HISTORY:  Unspecified fall, initial encounter    TECHNIQUE:  Three or four views of the right shoulder were performed.    COMPARISON:  None    FINDINGS:  Comminuted and mildly displaced fracture of the right proximal humerus with involvement of the neck and greater tuberosity.  No additional acute displaced fracture or dislocation.  Degenerative changes in the glenohumeral and acromioclavicular joints.  Soft tissue edema.  No unexpected radiopaque foreign body.                                       X-Ray Pelvis Routine AP (Final result)  Result time 07/03/25 09:14:04      Final result by Lance Colon MD (07/03/25 09:14:04)                   Impression:      No acute displaced fracture.      Electronically signed by: Lance Colon MD  Date:    07/03/2025  Time:    09:14               Narrative:    EXAMINATION:  XR PELVIS ROUTINE AP    CLINICAL HISTORY:  Unspecified fall, initial encounter    TECHNIQUE:  AP view of the pelvis was performed.    COMPARISON:  None.    FINDINGS:  No acute displaced fracture.  No dislocation.  Mild degenerative changes in both hips.  Scattered pelvic phleboliths.  No unexpected radiopaque foreign body.                                       CT Head Without Contrast (Final result)  Result time 07/03/25 08:24:48      Final result by Aamir Guerra DO (07/03/25 08:24:48)                   Impression:      CT head: No acute intracranial findings allowing for motion artifacts as detailed above specifically without evidence for acute intracranial hemorrhage or sulcal effacement to suggest large territory  recent infarction.    Further evaluation as warranted clinically.    CT cervical spine: Spondylo degenerative change cervical spine without evidence for acute fracture or traumatic subluxation.    See above for additional details.      Electronically signed by: Aamir Guerra DO  Date:    07/03/2025  Time:    08:24               Narrative:    EXAMINATION:  CT HEAD WITHOUT CONTRAST; CT CERVICAL SPINE WITHOUT CONTRAST    CLINICAL HISTORY:  Head trauma, minor (Age >= 65y);; Neck trauma (Age >= 65y);    TECHNIQUE:  CT head: Multiple sequential 5 mm axial images of the head without contrast.  Coronal and sagittal reformatted imaging from the axial acquisition.    CT cervical spine: Multiple sequential 1.25 mm axial images of the cervical spine without contrast.  Coronal and sagittal reformatted imaging from the axial acquisition.    COMPARISON:  None    FINDINGS:  CT head: Study is distorted by motion artifacts.  Within limits of the study there is no evidence for acute intracranial hemorrhage.  No sulcal effacement.  Few subcentimeter areas of hypoattenuation supratentorial white matter which are nonspecific and may represent mild chronic ischemic change.  Mild cerebral volume loss somewhat most prominent in the temporal lobes underlying neuro degenerative process not excluded..  Compensatory enlargement ventricles sulci and cisterns without hydrocephalus.  Lobular opacity left maxillary antra suggestive for mucous retention cyst.  Few patchy ethmoid air cell opacities anteriorly.    CT cervical spine:    Allowing for artifact from motion and beam hardening cervical sagittal alignment is within normal limits.  Cervical vertebral body heights and contours are within normal is without evidence for acute fracture.  There is degenerative disc disease with disc height loss and endplate degeneration at majority of cervical levels most pronounced at C6/C7 level with moderate height loss and endplate degeneration.  There is  small vacuum phenomena at this level as well    Heterogeneous lucency within the left aspect of C3 and C6 vertebra with additional focus in the dorsal T1 vertebral body most compatible with hemangiomas.    Incidental small lobular opacity along the right ventral lateral aspect of the proximal trachea which is indeterminate and may represent locular secretions.    Evaluation of the central canal neural foramen distorted by motion beam hardening artifacts allowing for this degenerative change most pronounced at C6/C7 level with posterior disc osteophyte, uncovertebral joint hypertrophy and facet arthropathy mild central canal stenosis with mild right and moderate to severe left neural foraminal stenosis.  No consolidation visualized lung apices.                                       CT Cervical Spine Without Contrast (Final result)  Result time 07/03/25 08:24:48      Final result by Aamir Guerra DO (07/03/25 08:24:48)                   Impression:      CT head: No acute intracranial findings allowing for motion artifacts as detailed above specifically without evidence for acute intracranial hemorrhage or sulcal effacement to suggest large territory recent infarction.    Further evaluation as warranted clinically.    CT cervical spine: Spondylo degenerative change cervical spine without evidence for acute fracture or traumatic subluxation.    See above for additional details.      Electronically signed by: Aamir Guerra DO  Date:    07/03/2025  Time:    08:24               Narrative:    EXAMINATION:  CT HEAD WITHOUT CONTRAST; CT CERVICAL SPINE WITHOUT CONTRAST    CLINICAL HISTORY:  Head trauma, minor (Age >= 65y);; Neck trauma (Age >= 65y);    TECHNIQUE:  CT head: Multiple sequential 5 mm axial images of the head without contrast.  Coronal and sagittal reformatted imaging from the axial acquisition.    CT cervical spine: Multiple sequential 1.25 mm axial images of the cervical spine without contrast.  Coronal and  sagittal reformatted imaging from the axial acquisition.    COMPARISON:  None    FINDINGS:  CT head: Study is distorted by motion artifacts.  Within limits of the study there is no evidence for acute intracranial hemorrhage.  No sulcal effacement.  Few subcentimeter areas of hypoattenuation supratentorial white matter which are nonspecific and may represent mild chronic ischemic change.  Mild cerebral volume loss somewhat most prominent in the temporal lobes underlying neuro degenerative process not excluded..  Compensatory enlargement ventricles sulci and cisterns without hydrocephalus.  Lobular opacity left maxillary antra suggestive for mucous retention cyst.  Few patchy ethmoid air cell opacities anteriorly.    CT cervical spine:    Allowing for artifact from motion and beam hardening cervical sagittal alignment is within normal limits.  Cervical vertebral body heights and contours are within normal is without evidence for acute fracture.  There is degenerative disc disease with disc height loss and endplate degeneration at majority of cervical levels most pronounced at C6/C7 level with moderate height loss and endplate degeneration.  There is small vacuum phenomena at this level as well    Heterogeneous lucency within the left aspect of C3 and C6 vertebra with additional focus in the dorsal T1 vertebral body most compatible with hemangiomas.    Incidental small lobular opacity along the right ventral lateral aspect of the proximal trachea which is indeterminate and may represent locular secretions.    Evaluation of the central canal neural foramen distorted by motion beam hardening artifacts allowing for this degenerative change most pronounced at C6/C7 level with posterior disc osteophyte, uncovertebral joint hypertrophy and facet arthropathy mild central canal stenosis with mild right and moderate to severe left neural foraminal stenosis.  No consolidation visualized lung apices.                                        Medications - No data to display  Medical Decision Making  Patient is a 84-year-old female with past medical history of dementia, and CLL that presents to the emergency department after fall that was unwitnessed. Most likely mechanical due to hx of dementia and falls but will get EKG/basic lab to assess for other obvious cause.     On initial evaluation she has no distress, speaking and cooperative. Neurovascualrly intact. No signs of thoracoabdominal, significant head, spinal pathology. Isolated R humeral injury on exam. However could be distracting so will need to scan head/neck due to age and fall    Xrays, ct pending.      Workup positive for R proximal humerus fracture with impaction. No signs of compartment syndrome on exam.  Likely nonoperative. Nothing emergent to do. Will put in sling with outpatient ortho followup. Stable for dc back to facility.    Amount and/or Complexity of Data Reviewed  Radiology: ordered.              Attending Attestation:   Physician Attestation Statement for Resident:  As the supervising MD   Physician Attestation Statement: I have personally seen and examined this patient.   I agree with the above history.  -:   As the supervising MD I agree with the above PE.     As the supervising MD I agree with the above treatment, course, plan, and disposition.                                   On my independent interpretation of the EKG: sinus, irregular with pac's, nonspecific t wave changes in III and avf, no stemi. Similar to old.        Clinical Impression:  Final diagnoses:  [W19.XXXA] Fall  [S42.291A] Closed fracture of head of right humerus, initial encounter (Primary)                       [1]   Social History  Tobacco Use    Smoking status: Former     Current packs/day: 0.25     Average packs/day: 0.3 packs/day for 2.0 years (0.5 ttl pk-yrs)     Types: Cigarettes     Passive exposure: Never    Smokeless tobacco: Never    Tobacco comments:     quit over 20 years ago -  smoked pack per week on and off for 10 years   Substance Use Topics    Alcohol use: No     Comment: rare    Drug use: No        Kirsten Mustafa MD  07/03/25 0996

## 2025-07-06 LAB — HOLD SPECIMEN: NORMAL

## 2025-07-30 NOTE — TELEPHONE ENCOUNTER
Call Center TCM Note      Flowsheet Row Responses   [facility] patient discharged from? [facility]   Does the patient have one of the following disease processes/diagnoses(primary or secondary)? Other   TCM attempt successful? Yes   Call start time 1355   Call end time 1401   Discharge diagnosis Palpitations   Person spoke with today (if not patient) and relationship Patient   Meds reviewed with patient/caregiver? Yes   Does the patient have all medications ordered at discharge? Yes   Is the patient taking all medications as directed (includes completed medication regime)? Yes   Comments PCP DR Burdick. Patient has a previously scheduled office visit in place with PCP for 8/5  115pm that she plans to keep. Message routed to office.   Does the patient have an appointment with their PCP within 7-14 days of discharge? Other  [Patient has an office visit type appt in place within timeframe]   Nursing Interventions Confirmed date/time of appointment, Routed TCM call to PCP office   Has home health visited the patient within 72 hours of discharge? N/A   Psychosocial issues? No   Did the patient receive a copy of their discharge instructions? Yes   Nursing interventions Reviewed instructions with patient   What is the patient's perception of their health status since discharge? Improving   Is the patient/caregiver able to teach back signs and symptoms related to disease process for when to call PCP? Yes   Is the patient/caregiver able to teach back signs and symptoms related to disease process for when to call 911? Yes   Is the patient/caregiver able to teach back the hierarchy of who to call/visit for symptoms/problems? PCP, Specialist, Home health nurse, Urgent Care, ED, 911 Yes   If the patient is a current smoker, are they able to teach back resources for cessation? Not a smoker   TCM call completed? Yes   Call end time 1401   Would this patient benefit from a Referral to [facility] Social Work? No   Is the patient interested  granddaughter called, she states that the pt is having excessive memory loss. Can pt be checked for dementia.    in additional calls from an ambulatory ? No            ELFEGO ZACARIAS - Registered Nurse    7/30/2025, 14:02 EDT